# Patient Record
Sex: FEMALE | Race: WHITE | NOT HISPANIC OR LATINO | Employment: FULL TIME | URBAN - METROPOLITAN AREA
[De-identification: names, ages, dates, MRNs, and addresses within clinical notes are randomized per-mention and may not be internally consistent; named-entity substitution may affect disease eponyms.]

---

## 2018-09-24 ENCOUNTER — AMBULATORY - HEALTHEAST (OUTPATIENT)
Dept: MULTI SPECIALTY CLINIC | Facility: CLINIC | Age: 33
End: 2018-09-24

## 2018-09-24 LAB — PAP SMEAR - HIM PATIENT REPORTED: NORMAL

## 2018-10-05 ENCOUNTER — COMMUNICATION - HEALTHEAST (OUTPATIENT)
Dept: FAMILY MEDICINE | Facility: CLINIC | Age: 33
End: 2018-10-05

## 2018-10-05 ENCOUNTER — OFFICE VISIT - HEALTHEAST (OUTPATIENT)
Dept: FAMILY MEDICINE | Facility: CLINIC | Age: 33
End: 2018-10-05

## 2018-10-05 DIAGNOSIS — F90.0 ATTENTION DEFICIT HYPERACTIVITY DISORDER (ADHD), PREDOMINANTLY INATTENTIVE TYPE: ICD-10-CM

## 2018-10-05 DIAGNOSIS — F41.9 ANXIETY: ICD-10-CM

## 2018-10-05 ASSESSMENT — MIFFLIN-ST. JEOR: SCORE: 1951.99

## 2018-11-05 ENCOUNTER — OFFICE VISIT - HEALTHEAST (OUTPATIENT)
Dept: FAMILY MEDICINE | Facility: CLINIC | Age: 33
End: 2018-11-05

## 2018-11-05 DIAGNOSIS — F41.9 ANXIETY: ICD-10-CM

## 2018-11-05 DIAGNOSIS — K61.1 PERIRECTAL ABSCESS: ICD-10-CM

## 2018-11-05 DIAGNOSIS — F90.0 ATTENTION DEFICIT HYPERACTIVITY DISORDER (ADHD), PREDOMINANTLY INATTENTIVE TYPE: ICD-10-CM

## 2018-11-05 ASSESSMENT — MIFFLIN-ST. JEOR: SCORE: 1952.45

## 2018-12-03 ENCOUNTER — COMMUNICATION - HEALTHEAST (OUTPATIENT)
Dept: FAMILY MEDICINE | Facility: CLINIC | Age: 33
End: 2018-12-03

## 2018-12-11 ENCOUNTER — OFFICE VISIT - HEALTHEAST (OUTPATIENT)
Dept: FAMILY MEDICINE | Facility: CLINIC | Age: 33
End: 2018-12-11

## 2018-12-11 DIAGNOSIS — F90.0 ATTENTION DEFICIT HYPERACTIVITY DISORDER (ADHD), PREDOMINANTLY INATTENTIVE TYPE: ICD-10-CM

## 2018-12-11 DIAGNOSIS — F41.9 ANXIETY: ICD-10-CM

## 2018-12-11 ASSESSMENT — MIFFLIN-ST. JEOR: SCORE: 1913.89

## 2019-01-04 ENCOUNTER — OFFICE VISIT - HEALTHEAST (OUTPATIENT)
Dept: FAMILY MEDICINE | Facility: CLINIC | Age: 34
End: 2019-01-04

## 2019-01-04 DIAGNOSIS — F41.9 ANXIETY: ICD-10-CM

## 2019-01-04 DIAGNOSIS — F90.0 ATTENTION DEFICIT HYPERACTIVITY DISORDER (ADHD), PREDOMINANTLY INATTENTIVE TYPE: ICD-10-CM

## 2019-01-04 ASSESSMENT — MIFFLIN-ST. JEOR: SCORE: 1888.83

## 2019-01-29 ENCOUNTER — COMMUNICATION - HEALTHEAST (OUTPATIENT)
Dept: SCHEDULING | Facility: CLINIC | Age: 34
End: 2019-01-29

## 2019-01-31 ENCOUNTER — COMMUNICATION - HEALTHEAST (OUTPATIENT)
Dept: FAMILY MEDICINE | Facility: CLINIC | Age: 34
End: 2019-01-31

## 2019-03-11 ENCOUNTER — COMMUNICATION - HEALTHEAST (OUTPATIENT)
Dept: FAMILY MEDICINE | Facility: CLINIC | Age: 34
End: 2019-03-11

## 2019-05-10 ENCOUNTER — COMMUNICATION - HEALTHEAST (OUTPATIENT)
Dept: FAMILY MEDICINE | Facility: CLINIC | Age: 34
End: 2019-05-10

## 2019-05-10 DIAGNOSIS — F90.0 ATTENTION DEFICIT HYPERACTIVITY DISORDER (ADHD), PREDOMINANTLY INATTENTIVE TYPE: ICD-10-CM

## 2019-06-28 ENCOUNTER — COMMUNICATION - HEALTHEAST (OUTPATIENT)
Dept: FAMILY MEDICINE | Facility: CLINIC | Age: 34
End: 2019-06-28

## 2019-06-28 DIAGNOSIS — F41.9 ANXIETY: ICD-10-CM

## 2019-07-10 ENCOUNTER — COMMUNICATION - HEALTHEAST (OUTPATIENT)
Dept: FAMILY MEDICINE | Facility: CLINIC | Age: 34
End: 2019-07-10

## 2019-07-10 DIAGNOSIS — F41.9 ANXIETY: ICD-10-CM

## 2019-09-17 ENCOUNTER — COMMUNICATION - HEALTHEAST (OUTPATIENT)
Dept: FAMILY MEDICINE | Facility: CLINIC | Age: 34
End: 2019-09-17

## 2019-09-17 ENCOUNTER — OFFICE VISIT - HEALTHEAST (OUTPATIENT)
Dept: FAMILY MEDICINE | Facility: CLINIC | Age: 34
End: 2019-09-17

## 2019-09-17 DIAGNOSIS — Z78.9 ELECTRONIC CIGARETTE USE: ICD-10-CM

## 2019-09-17 DIAGNOSIS — F41.9 ANXIETY: ICD-10-CM

## 2019-09-17 DIAGNOSIS — F90.0 ATTENTION DEFICIT HYPERACTIVITY DISORDER (ADHD), PREDOMINANTLY INATTENTIVE TYPE: ICD-10-CM

## 2019-09-17 DIAGNOSIS — Z23 NEED FOR VACCINATION: ICD-10-CM

## 2019-09-17 ASSESSMENT — ANXIETY QUESTIONNAIRES
4. TROUBLE RELAXING: SEVERAL DAYS
6. BECOMING EASILY ANNOYED OR IRRITABLE: SEVERAL DAYS
GAD7 TOTAL SCORE: 7
IF YOU CHECKED OFF ANY PROBLEMS ON THIS QUESTIONNAIRE, HOW DIFFICULT HAVE THESE PROBLEMS MADE IT FOR YOU TO DO YOUR WORK, TAKE CARE OF THINGS AT HOME, OR GET ALONG WITH OTHER PEOPLE: NOT DIFFICULT AT ALL
2. NOT BEING ABLE TO STOP OR CONTROL WORRYING: SEVERAL DAYS
1. FEELING NERVOUS, ANXIOUS, OR ON EDGE: MORE THAN HALF THE DAYS
5. BEING SO RESTLESS THAT IT IS HARD TO SIT STILL: NOT AT ALL
7. FEELING AFRAID AS IF SOMETHING AWFUL MIGHT HAPPEN: SEVERAL DAYS
3. WORRYING TOO MUCH ABOUT DIFFERENT THINGS: SEVERAL DAYS

## 2019-09-17 ASSESSMENT — PATIENT HEALTH QUESTIONNAIRE - PHQ9: SUM OF ALL RESPONSES TO PHQ QUESTIONS 1-9: 3

## 2019-09-17 ASSESSMENT — MIFFLIN-ST. JEOR: SCORE: 1913.78

## 2019-10-04 ENCOUNTER — OFFICE VISIT - HEALTHEAST (OUTPATIENT)
Dept: FAMILY MEDICINE | Facility: CLINIC | Age: 34
End: 2019-10-04

## 2019-10-04 DIAGNOSIS — L03.116 CELLULITIS OF LEFT LOWER EXTREMITY: ICD-10-CM

## 2019-10-08 ENCOUNTER — OFFICE VISIT - HEALTHEAST (OUTPATIENT)
Dept: FAMILY MEDICINE | Facility: CLINIC | Age: 34
End: 2019-10-08

## 2019-10-08 DIAGNOSIS — E66.01 CLASS 3 SEVERE OBESITY DUE TO EXCESS CALORIES WITHOUT SERIOUS COMORBIDITY WITH BODY MASS INDEX (BMI) OF 40.0 TO 44.9 IN ADULT (H): ICD-10-CM

## 2019-10-08 DIAGNOSIS — L02.419 CELLULITIS AND ABSCESS OF LEG: ICD-10-CM

## 2019-10-08 DIAGNOSIS — L03.119 CELLULITIS AND ABSCESS OF LEG: ICD-10-CM

## 2019-10-08 DIAGNOSIS — E66.813 CLASS 3 SEVERE OBESITY DUE TO EXCESS CALORIES WITHOUT SERIOUS COMORBIDITY WITH BODY MASS INDEX (BMI) OF 40.0 TO 44.9 IN ADULT (H): ICD-10-CM

## 2019-10-08 ASSESSMENT — MIFFLIN-ST. JEOR: SCORE: 1891.78

## 2020-01-29 ENCOUNTER — COMMUNICATION - HEALTHEAST (OUTPATIENT)
Dept: FAMILY MEDICINE | Facility: CLINIC | Age: 35
End: 2020-01-29

## 2020-01-29 DIAGNOSIS — Z87.891 PERSONAL HISTORY OF TOBACCO USE, PRESENTING HAZARDS TO HEALTH: ICD-10-CM

## 2020-01-29 DIAGNOSIS — F41.9 ANXIETY: ICD-10-CM

## 2020-01-29 DIAGNOSIS — F90.0 ATTENTION DEFICIT HYPERACTIVITY DISORDER (ADHD), PREDOMINANTLY INATTENTIVE TYPE: ICD-10-CM

## 2020-03-13 ENCOUNTER — COMMUNICATION - HEALTHEAST (OUTPATIENT)
Dept: FAMILY MEDICINE | Facility: CLINIC | Age: 35
End: 2020-03-13

## 2020-03-13 DIAGNOSIS — F90.0 ATTENTION DEFICIT HYPERACTIVITY DISORDER (ADHD), PREDOMINANTLY INATTENTIVE TYPE: ICD-10-CM

## 2020-03-13 DIAGNOSIS — K21.9 GASTROESOPHAGEAL REFLUX DISEASE, ESOPHAGITIS PRESENCE NOT SPECIFIED: ICD-10-CM

## 2020-03-18 ENCOUNTER — COMMUNICATION - HEALTHEAST (OUTPATIENT)
Dept: FAMILY MEDICINE | Facility: CLINIC | Age: 35
End: 2020-03-18

## 2020-12-10 ENCOUNTER — RECORDS - HEALTHEAST (OUTPATIENT)
Dept: LAB | Facility: CLINIC | Age: 35
End: 2020-12-10

## 2020-12-12 LAB — BACTERIA SPEC CULT: NORMAL

## 2021-01-07 ENCOUNTER — OFFICE VISIT - HEALTHEAST (OUTPATIENT)
Dept: FAMILY MEDICINE | Facility: CLINIC | Age: 36
End: 2021-01-07

## 2021-01-07 DIAGNOSIS — E66.01 CLASS 3 SEVERE OBESITY DUE TO EXCESS CALORIES WITHOUT SERIOUS COMORBIDITY WITH BODY MASS INDEX (BMI) OF 45.0 TO 49.9 IN ADULT (H): ICD-10-CM

## 2021-01-07 DIAGNOSIS — F32.1 CURRENT MODERATE EPISODE OF MAJOR DEPRESSIVE DISORDER, UNSPECIFIED WHETHER RECURRENT (H): ICD-10-CM

## 2021-01-07 DIAGNOSIS — E66.813 CLASS 3 SEVERE OBESITY DUE TO EXCESS CALORIES WITHOUT SERIOUS COMORBIDITY WITH BODY MASS INDEX (BMI) OF 45.0 TO 49.9 IN ADULT (H): ICD-10-CM

## 2021-01-07 DIAGNOSIS — F90.0 ATTENTION DEFICIT HYPERACTIVITY DISORDER (ADHD), PREDOMINANTLY INATTENTIVE TYPE: ICD-10-CM

## 2021-01-07 DIAGNOSIS — F41.9 ANXIETY: ICD-10-CM

## 2021-01-07 RX ORDER — ALPRAZOLAM 0.5 MG
0.25-0.5 TABLET ORAL DAILY PRN
Qty: 20 TABLET | Refills: 0 | Status: SHIPPED | OUTPATIENT
Start: 2021-01-07 | End: 2023-03-13

## 2021-01-07 ASSESSMENT — ANXIETY QUESTIONNAIRES
1. FEELING NERVOUS, ANXIOUS, OR ON EDGE: MORE THAN HALF THE DAYS
3. WORRYING TOO MUCH ABOUT DIFFERENT THINGS: MORE THAN HALF THE DAYS
4. TROUBLE RELAXING: MORE THAN HALF THE DAYS
7. FEELING AFRAID AS IF SOMETHING AWFUL MIGHT HAPPEN: SEVERAL DAYS
GAD7 TOTAL SCORE: 10
2. NOT BEING ABLE TO STOP OR CONTROL WORRYING: MORE THAN HALF THE DAYS
5. BEING SO RESTLESS THAT IT IS HARD TO SIT STILL: NOT AT ALL
IF YOU CHECKED OFF ANY PROBLEMS ON THIS QUESTIONNAIRE, HOW DIFFICULT HAVE THESE PROBLEMS MADE IT FOR YOU TO DO YOUR WORK, TAKE CARE OF THINGS AT HOME, OR GET ALONG WITH OTHER PEOPLE: VERY DIFFICULT
6. BECOMING EASILY ANNOYED OR IRRITABLE: SEVERAL DAYS

## 2021-01-07 ASSESSMENT — PATIENT HEALTH QUESTIONNAIRE - PHQ9: SUM OF ALL RESPONSES TO PHQ QUESTIONS 1-9: 14

## 2021-01-07 ASSESSMENT — MIFFLIN-ST. JEOR: SCORE: 1992.7

## 2021-02-05 ENCOUNTER — COMMUNICATION - HEALTHEAST (OUTPATIENT)
Dept: FAMILY MEDICINE | Facility: CLINIC | Age: 36
End: 2021-02-05

## 2021-02-05 DIAGNOSIS — F41.9 ANXIETY: ICD-10-CM

## 2021-02-11 ENCOUNTER — OFFICE VISIT - HEALTHEAST (OUTPATIENT)
Dept: FAMILY MEDICINE | Facility: CLINIC | Age: 36
End: 2021-02-11

## 2021-02-11 DIAGNOSIS — F41.9 ANXIETY: ICD-10-CM

## 2021-02-11 DIAGNOSIS — F32.0 MILD MAJOR DEPRESSION (H): ICD-10-CM

## 2021-02-11 DIAGNOSIS — J34.89 RHINORRHEA: ICD-10-CM

## 2021-02-11 ASSESSMENT — ANXIETY QUESTIONNAIRES
3. WORRYING TOO MUCH ABOUT DIFFERENT THINGS: SEVERAL DAYS
5. BEING SO RESTLESS THAT IT IS HARD TO SIT STILL: NOT AT ALL
GAD7 TOTAL SCORE: 4
IF YOU CHECKED OFF ANY PROBLEMS ON THIS QUESTIONNAIRE, HOW DIFFICULT HAVE THESE PROBLEMS MADE IT FOR YOU TO DO YOUR WORK, TAKE CARE OF THINGS AT HOME, OR GET ALONG WITH OTHER PEOPLE: SOMEWHAT DIFFICULT
1. FEELING NERVOUS, ANXIOUS, OR ON EDGE: SEVERAL DAYS
6. BECOMING EASILY ANNOYED OR IRRITABLE: NOT AT ALL
2. NOT BEING ABLE TO STOP OR CONTROL WORRYING: SEVERAL DAYS
7. FEELING AFRAID AS IF SOMETHING AWFUL MIGHT HAPPEN: NOT AT ALL
4. TROUBLE RELAXING: SEVERAL DAYS

## 2021-02-11 ASSESSMENT — PATIENT HEALTH QUESTIONNAIRE - PHQ9: SUM OF ALL RESPONSES TO PHQ QUESTIONS 1-9: 4

## 2021-02-11 ASSESSMENT — MIFFLIN-ST. JEOR: SCORE: 1989.98

## 2021-02-13 ENCOUNTER — COMMUNICATION - HEALTHEAST (OUTPATIENT)
Dept: SCHEDULING | Facility: CLINIC | Age: 36
End: 2021-02-13

## 2021-03-01 ENCOUNTER — COMMUNICATION - HEALTHEAST (OUTPATIENT)
Dept: FAMILY MEDICINE | Facility: CLINIC | Age: 36
End: 2021-03-01

## 2021-03-01 DIAGNOSIS — F41.9 ANXIETY: ICD-10-CM

## 2021-03-05 ENCOUNTER — COMMUNICATION - HEALTHEAST (OUTPATIENT)
Dept: ADMINISTRATIVE | Facility: CLINIC | Age: 36
End: 2021-03-05

## 2021-03-05 DIAGNOSIS — F90.0 ATTENTION DEFICIT HYPERACTIVITY DISORDER (ADHD), PREDOMINANTLY INATTENTIVE TYPE: ICD-10-CM

## 2021-04-26 ENCOUNTER — AMBULATORY - HEALTHEAST (OUTPATIENT)
Dept: NURSING | Facility: CLINIC | Age: 36
End: 2021-04-26

## 2021-04-28 ENCOUNTER — COMMUNICATION - HEALTHEAST (OUTPATIENT)
Dept: FAMILY MEDICINE | Facility: CLINIC | Age: 36
End: 2021-04-28

## 2021-04-28 DIAGNOSIS — F90.0 ATTENTION DEFICIT HYPERACTIVITY DISORDER (ADHD), PREDOMINANTLY INATTENTIVE TYPE: ICD-10-CM

## 2021-04-28 DIAGNOSIS — F41.9 ANXIETY: ICD-10-CM

## 2021-04-28 DIAGNOSIS — R12 HEARTBURN: ICD-10-CM

## 2021-04-28 RX ORDER — FAMOTIDINE 20 MG/1
TABLET, FILM COATED ORAL
Qty: 180 TABLET | Refills: 1 | Status: SHIPPED | OUTPATIENT
Start: 2021-04-28 | End: 2022-05-04

## 2021-05-03 ENCOUNTER — OFFICE VISIT - HEALTHEAST (OUTPATIENT)
Dept: FAMILY MEDICINE | Facility: CLINIC | Age: 36
End: 2021-05-03

## 2021-05-03 DIAGNOSIS — F41.9 ANXIETY: ICD-10-CM

## 2021-05-03 DIAGNOSIS — Z13.1 ENCOUNTER FOR SCREENING FOR DIABETES MELLITUS: ICD-10-CM

## 2021-05-03 DIAGNOSIS — Z00.00 ENCOUNTER FOR GENERAL ADULT MEDICAL EXAMINATION WITHOUT ABNORMAL FINDINGS: ICD-10-CM

## 2021-05-03 DIAGNOSIS — R73.01 IMPAIRED FASTING GLUCOSE: ICD-10-CM

## 2021-05-03 DIAGNOSIS — Z13.220 ENCOUNTER FOR SCREENING FOR LIPOID DISORDERS: ICD-10-CM

## 2021-05-03 DIAGNOSIS — D17.30 LIPOMA OF SKIN AND SUBCUTANEOUS TISSUE: ICD-10-CM

## 2021-05-03 LAB
CHOLEST SERPL-MCNC: 169 MG/DL
FASTING STATUS PATIENT QL REPORTED: YES
FASTING STATUS PATIENT QL REPORTED: YES
GLUCOSE BLD-MCNC: 162 MG/DL (ref 70–99)
HDLC SERPL-MCNC: 41 MG/DL
HIV 1+2 AB+HIV1 P24 AG SERPL QL IA: NEGATIVE
LDLC SERPL CALC-MCNC: 98 MG/DL
TRIGL SERPL-MCNC: 148 MG/DL

## 2021-05-03 RX ORDER — SERTRALINE HYDROCHLORIDE 100 MG/1
100 TABLET, FILM COATED ORAL DAILY
Qty: 90 TABLET | Refills: 3 | Status: SHIPPED | OUTPATIENT
Start: 2021-05-03 | End: 2021-09-01

## 2021-05-03 ASSESSMENT — ANXIETY QUESTIONNAIRES
GAD7 TOTAL SCORE: 1
5. BEING SO RESTLESS THAT IT IS HARD TO SIT STILL: NOT AT ALL
2. NOT BEING ABLE TO STOP OR CONTROL WORRYING: NOT AT ALL
1. FEELING NERVOUS, ANXIOUS, OR ON EDGE: SEVERAL DAYS
7. FEELING AFRAID AS IF SOMETHING AWFUL MIGHT HAPPEN: NOT AT ALL
4. TROUBLE RELAXING: NOT AT ALL
3. WORRYING TOO MUCH ABOUT DIFFERENT THINGS: NOT AT ALL
6. BECOMING EASILY ANNOYED OR IRRITABLE: NOT AT ALL

## 2021-05-03 ASSESSMENT — MIFFLIN-ST. JEOR: SCORE: 1991.8

## 2021-05-03 ASSESSMENT — PATIENT HEALTH QUESTIONNAIRE - PHQ9: SUM OF ALL RESPONSES TO PHQ QUESTIONS 1-9: 6

## 2021-05-04 ENCOUNTER — AMBULATORY - HEALTHEAST (OUTPATIENT)
Dept: FAMILY MEDICINE | Facility: CLINIC | Age: 36
End: 2021-05-04

## 2021-05-04 ENCOUNTER — COMMUNICATION - HEALTHEAST (OUTPATIENT)
Dept: FAMILY MEDICINE | Facility: CLINIC | Age: 36
End: 2021-05-04

## 2021-05-04 DIAGNOSIS — R76.8 POSITIVE HEPATITIS C ANTIBODY TEST: ICD-10-CM

## 2021-05-04 LAB
HBA1C MFR BLD: 7.3 %
HCV AB SERPL QL IA: POSITIVE

## 2021-05-05 ENCOUNTER — AMBULATORY - HEALTHEAST (OUTPATIENT)
Dept: LAB | Facility: CLINIC | Age: 36
End: 2021-05-05

## 2021-05-05 DIAGNOSIS — R76.8 POSITIVE HEPATITIS C ANTIBODY TEST: ICD-10-CM

## 2021-05-09 LAB
HCV RNA SERPL NAA+PROBE-ACNC: NORMAL [IU]/ML
HCV RNA SERPL NAA+PROBE-LOG IU: NORMAL LOG IU/ML

## 2021-05-17 ENCOUNTER — AMBULATORY - HEALTHEAST (OUTPATIENT)
Dept: NURSING | Facility: CLINIC | Age: 36
End: 2021-05-17

## 2021-05-19 ENCOUNTER — OFFICE VISIT - HEALTHEAST (OUTPATIENT)
Dept: FAMILY MEDICINE | Facility: CLINIC | Age: 36
End: 2021-05-19

## 2021-05-19 DIAGNOSIS — E11.9 TYPE 2 DIABETES MELLITUS WITHOUT COMPLICATION, WITHOUT LONG-TERM CURRENT USE OF INSULIN (H): ICD-10-CM

## 2021-05-19 DIAGNOSIS — F90.0 ATTENTION DEFICIT HYPERACTIVITY DISORDER (ADHD), PREDOMINANTLY INATTENTIVE TYPE: ICD-10-CM

## 2021-05-19 DIAGNOSIS — R03.0 ELEVATED BLOOD PRESSURE READING WITHOUT DIAGNOSIS OF HYPERTENSION: ICD-10-CM

## 2021-05-19 DIAGNOSIS — M79.644 PAIN OF FINGER OF RIGHT HAND: ICD-10-CM

## 2021-05-19 LAB
ALBUMIN SERPL-MCNC: 3.9 G/DL (ref 3.5–5)
ALP SERPL-CCNC: 68 U/L (ref 45–120)
ALT SERPL W P-5'-P-CCNC: 45 U/L (ref 0–45)
ANION GAP SERPL CALCULATED.3IONS-SCNC: 13 MMOL/L (ref 5–18)
AST SERPL W P-5'-P-CCNC: 30 U/L (ref 0–40)
BILIRUB SERPL-MCNC: 0.4 MG/DL (ref 0–1)
BUN SERPL-MCNC: 8 MG/DL (ref 8–22)
CALCIUM SERPL-MCNC: 9.2 MG/DL (ref 8.5–10.5)
CHLORIDE BLD-SCNC: 106 MMOL/L (ref 98–107)
CO2 SERPL-SCNC: 23 MMOL/L (ref 22–31)
CREAT SERPL-MCNC: 0.72 MG/DL (ref 0.6–1.1)
CREAT UR-MCNC: 121.2 MG/DL
GFR SERPL CREATININE-BSD FRML MDRD: >60 ML/MIN/1.73M2
GLUCOSE BLD-MCNC: 108 MG/DL (ref 70–125)
MICROALBUMIN UR-MCNC: 1.03 MG/DL (ref 0–1.99)
MICROALBUMIN/CREAT UR: 8.5 MG/G
POTASSIUM BLD-SCNC: 4.2 MMOL/L (ref 3.5–5)
PROT SERPL-MCNC: 6.8 G/DL (ref 6–8)
SODIUM SERPL-SCNC: 142 MMOL/L (ref 136–145)

## 2021-05-19 ASSESSMENT — MIFFLIN-ST. JEOR: SCORE: 1989.98

## 2021-05-26 ASSESSMENT — PATIENT HEALTH QUESTIONNAIRE - PHQ9: SUM OF ALL RESPONSES TO PHQ QUESTIONS 1-9: 3

## 2021-05-27 VITALS
WEIGHT: 286.4 LBS | BODY MASS INDEX: 47.72 KG/M2 | HEART RATE: 74 BPM | SYSTOLIC BLOOD PRESSURE: 137 MMHG | HEIGHT: 65 IN | DIASTOLIC BLOOD PRESSURE: 89 MMHG

## 2021-05-27 VITALS
HEIGHT: 65 IN | SYSTOLIC BLOOD PRESSURE: 137 MMHG | WEIGHT: 286.8 LBS | HEART RATE: 83 BPM | BODY MASS INDEX: 47.78 KG/M2 | DIASTOLIC BLOOD PRESSURE: 90 MMHG

## 2021-05-27 ASSESSMENT — PATIENT HEALTH QUESTIONNAIRE - PHQ9
SUM OF ALL RESPONSES TO PHQ QUESTIONS 1-9: 14
SUM OF ALL RESPONSES TO PHQ QUESTIONS 1-9: 4
SUM OF ALL RESPONSES TO PHQ QUESTIONS 1-9: 6

## 2021-05-28 ASSESSMENT — ANXIETY QUESTIONNAIRES
GAD7 TOTAL SCORE: 1
GAD7 TOTAL SCORE: 4
GAD7 TOTAL SCORE: 10
GAD7 TOTAL SCORE: 7

## 2021-05-28 NOTE — TELEPHONE ENCOUNTER
Patient is completely out of medications    Controlled Substance Refill Request  Medication Name:   Requested Prescriptions     Pending Prescriptions Disp Refills     methylphenidate HCl (CONCERTA) 36 MG CR tablet 60 tablet 0     Sig: Take 2 tablets (72 mg total) by mouth daily.     methylphenidate HCl (CONCERTA) 36 MG CR tablet 60 tablet 0     Sig: Take 2 tablets (72 mg total) by mouth daily.     methylphenidate HCl (CONCERTA) 36 MG CR tablet 60 tablet 0     Sig: Take 2 tabs daily     Date Last Fill: 2/1/2019  Pharmacy: [facility]      Submit electronically to pharmacy  Controlled Substance Agreement Date Scanned:   Encounter-Level CSA Scan Date:    There are no encounter-level csa scan date.       Last office visit with prescriber/PCP: 1/4/2019 Ashley Lora MD OR same dept: 1/4/2019 Ashley Lora MD OR same specialty: 1/4/2019 Ashley Lora MD  Last physical: Visit date not found Last MTM visit: Visit date not found

## 2021-05-30 NOTE — TELEPHONE ENCOUNTER
Patient last seen 01/04/19    Patient advised to be seen in 6 months    Please review and advise.  Thank you so much!  Pita Bhakta, CMA

## 2021-05-30 NOTE — TELEPHONE ENCOUNTER
Patient last seen 01/04/19    Wants refill sertraline    Please review and advise.  Thank you so much!  Pita Bhakta, CMA

## 2021-06-01 VITALS — BODY MASS INDEX: 46.47 KG/M2 | WEIGHT: 278.9 LBS | HEIGHT: 65 IN

## 2021-06-01 NOTE — PROGRESS NOTES
Assessment / Impression     1. Attention deficit hyperactivity disorder (ADHD), predominantly inattentive type  methylphenidate HCl (CONCERTA) 36 MG CR tablet    methylphenidate HCl (CONCERTA) 36 MG CR tablet    methylphenidate HCl (CONCERTA) 36 MG CR tablet   2. Electronic cigarette use  nicotine (NICODERM CQ) 14 mg/24 hr   3. Anxiety     4. Need for vaccination  Influenza,Seasonal,Quad,INJ =/>6months         Plan:     ADHD: Had previously done well on higher dose of Concerta, will restart patient on this dose.  Reviewed controlled substance agreement with patient, it was signed, copy will be scanned to her chart.    Vaping: Discussed with patient the significant potential dangers of irregular heart beating use, including lung damage.  Counseled patient regarding cessation of vaping.  She was given a prescription for nicotine patch 14 mg to be used daily.  Instructed patient on use.    Anxiety: She is doing well currently on sertraline 50 mg daily, and she is not in need of refills.  Long discussion with patient today regarding use of Xanax on an as-needed basis.  I did review  with patient which did show that she has previously gone this prescription from multiple providers.  Patient states she did not need prescription today but would hope that I could refill this in the future if needed.  Discussed with patient that I would not expect her to need more than one prescription per year, given patient states she is using Xanax 1-2 times per month.  Patient understands and agrees.  Again, her controlled substance agreement would cover Xanax prescription as well.    Return in about 6 months (around 3/17/2020) for Med Check, Follow Up.    Subjective:      HPI: Teetee Alvarado is a 34 y.o. female who presents for medication follow-up for ADHD, depression, anxiety, and requesting nicotine patch.  She had run out of Concerta for a while because she did not have insurance until she started her new job.  As a result, instead  of taking Concerta 72 mg daily, she had been taking Concerta 36 mg daily to lengthen her daily use, though she certainly did not feel that it was as helpful.  She denies any adverse effects of medication such as palpitations or appetite suppression.    Anxiety: Currently taking 50 mg daily which has been helpful and keeps her anxiety well-controlled for the most part.  She does have Xanax 0.5 mg and will take half to 1 tab as needed, though states she is using it infrequently, maybe once or twice a month.  She likes it more for the fact that she has it on hand.  She is wondering if I will continue to manage this prescription, though she does not need refill right now.    Patient is no longer smoking cigarettes though now is vaping daily.  She had previously used nicotine patch which had been helpful.        Medical History:     Patient Active Problem List   Diagnosis     Attention deficit hyperactivity disorder (ADHD), predominantly inattentive type     Anxiety     PCOS (polycystic ovarian syndrome)       No past medical history on file.    No past surgical history on file.    Current Medications:     Current Outpatient Medications   Medication Sig     ALPRAZolam (XANAX) 0.5 MG tablet Take 0.5-1 tablets (0.25-0.5 mg total) by mouth daily as needed for anxiety.     ibuprofen (ADVIL,MOTRIN) 200 MG tablet Take 600 mg by mouth daily as needed.     [START ON 11/16/2019] methylphenidate HCl (CONCERTA) 36 MG CR tablet Take 2 tablets (72 mg total) by mouth daily.     ranitidine (ZANTAC) 150 MG tablet Take 150 mg by mouth.     sertraline (ZOLOFT) 50 MG tablet Take 1 tablet (50 mg total) by mouth daily.     [START ON 10/17/2019] methylphenidate HCl (CONCERTA) 36 MG CR tablet Take 2 tablets (72 mg total) by mouth daily.     methylphenidate HCl (CONCERTA) 36 MG CR tablet Take 1 tablet (36 mg total) by mouth daily.     multivitamin (ONE A DAY) per tablet Take 1 tablet by mouth daily.     nicotine (NICODERM CQ) 14 mg/24 hr Place  "1 patch on the skin daily.       Family History:   No family history on file.    Review of Systems  All other systems reviewed and are negative.         Social History:     Social History     Tobacco Use   Smoking Status Current Every Day Smoker   Smokeless Tobacco Current User   Tobacco Comment    vaping-daily     Social History     Social History Narrative     Not on file         Objective:     /73 (Patient Site: Right Arm, Patient Position: Sitting, Cuff Size: Adult Large)   Pulse 78   Temp 97.9  F (36.6  C) (Oral)   Resp 22   Ht 5' 5\" (1.651 m)   Wt (!) 269 lb 9.6 oz (122.3 kg)   LMP 09/03/2019 (Approximate) Comment: light spotting, PCOS unsure  BMI 44.86 kg/m    Physical Examination: General appearance - alert, well appearing, and in no distress  Eyes: pupils equal and reactive, extraocular eye movements intact  Mouth: mucous membranes moist, pharynx normal without lesions  Neck: supple, no significant adenopathy or thyromegaly  Lungs: clear to auscultation, no wheezes, rales or rhonchi, symmetric air entry  Heart: normal rate, regular rhythm, normal S1, S2, no murmurs.  Neurological: alert, oriented, normal speech, no focal findings or movement disorder noted.    Extremities: No edema, no clubbing or cyanosis  Psychiatric: Normal affect. Does not appear anxious or depressed.  Normal speech pattern.  Maintains eye contact.  PHQ-9 = 3.  JAE-7 = 7.  See flow sheet for details.    No results found for this or any previous visit (from the past 168 hour(s)).      Ashley Lora MD  9/17/2019  1:05 PM        "

## 2021-06-01 NOTE — TELEPHONE ENCOUNTER
Received a fax from [employer] requesting clarification of RX Concerta   All RX's should be for 2 pills     Called and verbally advised pharmacy should be 2 daily     Said because this is a controlled substance must be resent electronically    Please review and advise.  Thank you so much!  Pita Bhakta, CMA

## 2021-06-02 VITALS — WEIGHT: 264.1 LBS | BODY MASS INDEX: 44 KG/M2 | HEIGHT: 65 IN

## 2021-06-02 VITALS — BODY MASS INDEX: 46.48 KG/M2 | WEIGHT: 279 LBS | HEIGHT: 65 IN

## 2021-06-02 VITALS — HEIGHT: 65 IN | WEIGHT: 270.5 LBS | BODY MASS INDEX: 45.07 KG/M2

## 2021-06-02 NOTE — PROGRESS NOTES
"ASSESSMENT:   1. Cellulitis of left lower extremity  cephalexin (KEFLEX) 500 MG capsule       PLAN:  There is an area of cellulitis to the patient's left inner thigh on exam today, small mobile tender mass is palpable on exam.  There is no fluctuant area amenable to drainage, exam favors more of an enlarged lymph node or cyst to than an abscess.  Given patient's history, we will start her on cephalexin 4 times daily to treat the cellulitis.  Did discuss with her at length signs and symptoms of worsening illness and reasons that should prompt presentation to the emergency department.    I discussed red flag symptoms, return precautions to clinic/ER and follow up care with patient/parent.  Expected clinical course, symptomatic cares advised. Questions answered. Patient/parent amenable with plan.    Patient Instructions:  Patient Instructions   Cellulitis is the cause of your symptoms today. We are going to start you on antibiotics.       For your infection, please take the antibiotic, Keflex, 4 times a day for the next 10 days.     For your pain, please use Ibuprofen 600mg every 6 hours as needed for pain.     Keep the leg elevated to reduce swelling and promote healing.     We marked the edges of the wound with a surgical marker. Recheck in the Clinic in 1 to 2 days to be sure the infection is improving.    Please take your medicines as recommended above and review the discharge instructions for concerning signs/symptoms that would require your prompt return to the emergency department for further evaluation. Please follow up in clinic as we have recommended below. If your symptoms worsen, develop fevers, nausea, vomiting, or increased pain, prior to your follow up appointment, please return to clinic/emergency department.           SUBJECTIVE:   Teetee Alvarado is a 34 y.o. female who presents today with \"lump\" to the inner left thigh which became painful yesterday.  Patient is unsure how long the lump has been there.  " She has a previous history of abscess in this area with associated cellulitis that resulted in sepsis.  She has not had any fevers or ill symptoms.  Denies nausea or vomiting, increased fatigue, measured fever.  Denies sweats, chills, body aches.  Area overlying the lump turned red this morning prompting her to seek treatment.  No drainage from the area.  No home treatment for this.      ROS:  Comprehensive 12 pt ROS completed, positives noted in HPI, otherwise negative.      Past Medical History:  Patient Active Problem List   Diagnosis     Attention deficit hyperactivity disorder (ADHD), predominantly inattentive type     Anxiety     PCOS (polycystic ovarian syndrome)       Surgical History:  No past surgical history on file.        Family History:  No family history on file.    Reviewed; Non-contributory    Social History     Tobacco Use   Smoking Status Current Every Day Smoker     Packs/day: 0.00   Smokeless Tobacco Never Used   Tobacco Comment    vaping-daily     Current Medications:  Current Outpatient Medications on File Prior to Visit   Medication Sig Dispense Refill     [START ON 11/16/2019] methylphenidate HCl (CONCERTA) 36 MG CR tablet Take 2 tablets (72 mg total) by mouth daily. 60 tablet 0     nicotine (NICODERM CQ) 14 mg/24 hr Place 1 patch on the skin daily. 30 patch 0     ranitidine (ZANTAC) 150 MG tablet Take 150 mg by mouth.       sertraline (ZOLOFT) 50 MG tablet Take 1 tablet (50 mg total) by mouth daily. 90 tablet 1     ALPRAZolam (XANAX) 0.5 MG tablet Take 0.5-1 tablets (0.25-0.5 mg total) by mouth daily as needed for anxiety. 20 tablet 0     ibuprofen (ADVIL,MOTRIN) 200 MG tablet Take 600 mg by mouth daily as needed.       [START ON 10/17/2019] methylphenidate HCl (CONCERTA) 36 MG CR tablet Take 2 tablets (72 mg total) by mouth daily. 60 tablet 0     methylphenidate HCl (CONCERTA) 36 MG CR tablet Take 2 tablets (72 mg total) by mouth daily. 60 tablet 0     multivitamin (ONE A DAY) per tablet  Take 1 tablet by mouth daily.       No current facility-administered medications on file prior to visit.        Allergies:   No Known Allergies    OBJECTIVE:   Vitals:    10/04/19 1539   BP: 134/85   Patient Site: Right Arm   Patient Position: Sitting   Cuff Size: Adult Large   Pulse: 88   Resp: 16   Temp: 98.4  F (36.9  C)   TempSrc: Oral   SpO2: 97%   Weight: (!) 268 lb (121.6 kg)     Physical exam reveals a pleasant 34 y.o. female.   General: Appears healthy, alert and cooperative. Non-toxic appearance.  Eyes: Nonicteric  Neck: supple  Pulmonary/Chest: Even, unlabored  Heart: regular rate   Left lower extremity: Near the left inguinal fold, there is an area of erythema approximately 2 cm x 3 cm.  Beneath this there is a firm, mobile, tender mass.  There is no fluctuance.  It is about 1 cm in diameter.  Favors an enlarged lymph node or cyst versus abscess  Neuro: Alert, oriented. Non-focal.  Skin: pink, warm, dry, and without lesions on limited skin exam. No rashes.  Psychiatric: Normal mood and affect.  Normal judgement and thought content. Normal behavior.       RADIOLOGY  none  LABORATORY STUDIES    none      Bernadette Olmos, ASHELY

## 2021-06-02 NOTE — PATIENT INSTRUCTIONS - HE
Cellulitis is the cause of your symptoms today. We are going to start you on antibiotics.       For your infection, please take the antibiotic, Keflex, 4 times a day for the next 10 days.     For your pain, please use Ibuprofen 600mg every 6 hours as needed for pain.     Keep the leg elevated to reduce swelling and promote healing.     We marked the edges of the wound with a surgical marker. Recheck in the Clinic in 1 to 2 days to be sure the infection is improving.    Please take your medicines as recommended above and review the discharge instructions for concerning signs/symptoms that would require your prompt return to the emergency department for further evaluation. Please follow up in clinic as we have recommended below. If your symptoms worsen, develop fevers, nausea, vomiting, or increased pain, prior to your follow up appointment, please return to clinic/emergency department.

## 2021-06-02 NOTE — PROGRESS NOTES
OFFICE VISIT NOTE      Assessment & Plan   Teetee Alvarado is a 34 y.o. female who is in the midst of treatment for an early cellulitis. She came in to have it checked because she feared it was not healing and possibly was spreading. Actually, on exam, it appears to be healing very well. I encouraged her with the exam results and my opinion. I encouraged her to complete her course of antibiotics and continue to self-monitor closely. If fevers or more/new soreness arises, then RTC.    Diagnoses and all orders for this visit:    Cellulitis and abscess of leg    Class 3 severe obesity due to excess calories without serious comorbidity with body mass index (BMI) of 40.0 to 44.9 in adult (H)                Subjective:   Chief Complaint:  Cellulitis (Left leg)    34 y.o. female.     1) She has been on antibiotics, cephalexin, for 4 days, and feels that the cellulitis is not getting better. She even wonders if it is instead spreading. She was told there is an enlargement of lymph nodes by her left inner thigh, but is unsure if it is her lymph node. She has not looked at the involved area (with a mirror).    2) She feels pain in her skin, not in the muscle, wrapping around her hip.     Current Outpatient Medications   Medication Sig     ALPRAZolam (XANAX) 0.5 MG tablet Take 0.5-1 tablets (0.25-0.5 mg total) by mouth daily as needed for anxiety.     cephalexin (KEFLEX) 500 MG capsule Take 1 capsule (500 mg total) by mouth 4 (four) times a day for 10 days.     ibuprofen (ADVIL,MOTRIN) 200 MG tablet Take 600 mg by mouth daily as needed.     [START ON 11/16/2019] methylphenidate HCl (CONCERTA) 36 MG CR tablet Take 2 tablets (72 mg total) by mouth daily.     [START ON 10/17/2019] methylphenidate HCl (CONCERTA) 36 MG CR tablet Take 2 tablets (72 mg total) by mouth daily.     methylphenidate HCl (CONCERTA) 36 MG CR tablet Take 2 tablets (72 mg total) by mouth daily.     multivitamin (ONE A DAY) per tablet Take 1 tablet by mouth  "daily.     nicotine (NICODERM CQ) 14 mg/24 hr Place 1 patch on the skin daily.     ranitidine (ZANTAC) 150 MG tablet Take 150 mg by mouth.     sertraline (ZOLOFT) 50 MG tablet Take 1 tablet (50 mg total) by mouth daily.       PSFHx: appropriate sections of PMH completed/filled in   Tobacco Status:  She  reports that she has been smoking. She has been smoking about 0.00 packs per day. She has never used smokeless tobacco.    Review of Systems: Pain in skin.  All other systems negative except as noted above.    Objective:    /70 (Patient Site: Left Arm, Patient Position: Sitting, Cuff Size: Adult Large)   Pulse 68   Temp 98.8  F (37.1  C) (Oral)   Resp 16   Ht 5' 5\" (1.651 m)   Wt (!) 264 lb 12 oz (120.1 kg)   LMP 09/24/2019 (Approximate)   BMI 44.06 kg/m      GENERAL: No acute distress.Obese  SKIN: left groin well-healed surgical scar noted, no erythema, patient had difficulty finding her sore spot, 2 lipomas found in upper medial thigh, palpated very soft and almost non-tender. In the left groin 1 lymph node was slightly enlarged but soft, she said mildly tender  No pubic hair erythema or folliculitis present in the perineum      Data Review  ADDITIONAL HISTORY SUMMARIZED (2): Reviewed the walk in care note from 10/04/2019 on cellulitis of left lower extremity.  DECISION TO OBTAIN EXTRA INFORMATION (1): None.   RADIOLOGY TESTS (1): None.  LABS (1): None.  MEDICINE TESTS (1): None.  INDEPENDENT REVIEW (2 each): None.       Spent 13 min face to face with patient with more the 50% spent in counseling, education and coordination of care and discussing cellulitis, lymph nodes.      IJaney am scribing for and in the presence of, Dr. Clemente.  I, Dr. DENICE Clemente, personally performed the services described in this documentation, as scribed by Janey Masters in my presence, and it is both accurate and complete.      Total Data  Points: 2    "

## 2021-06-03 VITALS
HEART RATE: 88 BPM | DIASTOLIC BLOOD PRESSURE: 85 MMHG | TEMPERATURE: 98.4 F | RESPIRATION RATE: 16 BRPM | WEIGHT: 268 LBS | BODY MASS INDEX: 44.6 KG/M2 | OXYGEN SATURATION: 97 % | SYSTOLIC BLOOD PRESSURE: 134 MMHG

## 2021-06-03 VITALS
SYSTOLIC BLOOD PRESSURE: 118 MMHG | DIASTOLIC BLOOD PRESSURE: 70 MMHG | HEIGHT: 65 IN | TEMPERATURE: 98.8 F | HEART RATE: 68 BPM | WEIGHT: 264.75 LBS | BODY MASS INDEX: 44.11 KG/M2 | RESPIRATION RATE: 16 BRPM

## 2021-06-03 VITALS
TEMPERATURE: 97.9 F | BODY MASS INDEX: 44.92 KG/M2 | SYSTOLIC BLOOD PRESSURE: 118 MMHG | HEART RATE: 78 BPM | DIASTOLIC BLOOD PRESSURE: 73 MMHG | RESPIRATION RATE: 22 BRPM | WEIGHT: 269.6 LBS | HEIGHT: 65 IN

## 2021-06-05 VITALS
WEIGHT: 287 LBS | SYSTOLIC BLOOD PRESSURE: 132 MMHG | DIASTOLIC BLOOD PRESSURE: 76 MMHG | HEART RATE: 75 BPM | HEIGHT: 65 IN | TEMPERATURE: 98.4 F | BODY MASS INDEX: 47.82 KG/M2

## 2021-06-05 VITALS
HEART RATE: 67 BPM | WEIGHT: 286.4 LBS | SYSTOLIC BLOOD PRESSURE: 142 MMHG | HEIGHT: 65 IN | RESPIRATION RATE: 16 BRPM | DIASTOLIC BLOOD PRESSURE: 87 MMHG | BODY MASS INDEX: 47.72 KG/M2

## 2021-06-05 NOTE — TELEPHONE ENCOUNTER
Refill Request  Did you contact pharmacy: No  Medication name:   Requested Prescriptions     Pending Prescriptions Disp Refills     sertraline (ZOLOFT) 50 MG tablet 90 tablet 1     Sig: Take 1 tablet (50 mg total) by mouth daily.     Who prescribed the medication: Ashley Lora MD  Requested Pharmacy: [facility]  Is patient out of medication: No.  3 days left  Patient notified refills processed in 3 business days:  yes  Okay to leave a detailed message: yes

## 2021-06-05 NOTE — TELEPHONE ENCOUNTER
Medication Question or Clarification  Who is calling: Patient  What medication are you calling about (include dose and sig)?: nicotine patch Step 2 step  Who prescribed the medication?: Ashley Lora MD per patient.  What is your question/concern?: The patient is asking to go back on patch Step1. She is having cravings with the Step 2 patches.  Patient states she is still vaping.  Please advise.   Requested Pharmacy: [facility]  Okay to leave a detailed message?: Yes

## 2021-06-05 NOTE — TELEPHONE ENCOUNTER
Controlled Substance Refill Request  Medication Name:   Requested Prescriptions     Pending Prescriptions Disp Refills     methylphenidate HCl (CONCERTA) 36 MG CR tablet 60 tablet 0     Sig: Take 2 tablets (72 mg total) by mouth daily.     Date Last Fill: 9/17/19  Is patient out of medication?:  Yes  Patient notified refills processed within 3 business days:  Yes  Requested Pharmacy: [facility]  Submit electronically to pharmacy  Controlled Substance Agreement on file:   Encounter-Level CSA Scan Date:    There are no encounter-level csa scan date.        Last office visit:  10/8/19

## 2021-06-05 NOTE — TELEPHONE ENCOUNTER
Per pt note below left a messae on her VM letting her know her step 1 patches were sent to the [facility]

## 2021-06-06 NOTE — TELEPHONE ENCOUNTER
Patient is asking for three refills if possible.   Controlled Substance Refill Request  Medication Name:   Requested Prescriptions     Pending Prescriptions Disp Refills     methylphenidate HCl (CONCERTA) 36 MG CR tablet 60 tablet 0     Sig: Take 2 tablets (72 mg total) by mouth daily.     Date Last Fill: 1/29/20  Is patient out of medication?: yes  Patient notified refills processed within 3 business days:  Yes  Requested Pharmacy: [facility]  Submit electronically to pharmacy  Controlled Substance Agreement on file:   Encounter-Level CSA Scan Date:    There are no encounter-level csa scan date.        Last office visit:  10/8/2019

## 2021-06-06 NOTE — TELEPHONE ENCOUNTER
Medication Request  Medication name: ranitidine  Requested Pharmacy: [facility]  Reason for request:   ranitidine (ZANTAC) 150 MG tablet  150 mg, Oral         Summary: Take 150 mg by mouth.   Dose, Frequency: 150 mg  Start: 1/21/2019  Ord/Sold: 9/17/2019           When did you use medication last?:  today  Patient offered appointment:  no  Okay to leave a detailed message: yes

## 2021-06-06 NOTE — TELEPHONE ENCOUNTER
Dx: Gastroesophageal reflux disease, esophagitis presence not specified    1 tab twice daily, her last Rx at this dose was for 12 months (01/2019) so this was her maintenance dose

## 2021-06-06 NOTE — TELEPHONE ENCOUNTER
This was previously prescribed by another provider, can you clarify how often pt is taking andwhat is she using it for?

## 2021-06-06 NOTE — TELEPHONE ENCOUNTER
Called patient regarding upcoming appointment on Monday.  Patient advised we are changing appointments due to to COVID19.    Patient expressed understanding.    Patient chose to do an e-visit    Patient sent a link to sign up for mychart, advised to initiate e-visit when she is available.    appt for Monday cancelled.    Pita Bhakta, [facility]

## 2021-06-11 ENCOUNTER — COMMUNICATION - HEALTHEAST (OUTPATIENT)
Dept: FAMILY MEDICINE | Facility: CLINIC | Age: 36
End: 2021-06-11

## 2021-06-11 DIAGNOSIS — F90.0 ATTENTION DEFICIT HYPERACTIVITY DISORDER (ADHD), PREDOMINANTLY INATTENTIVE TYPE: ICD-10-CM

## 2021-06-11 RX ORDER — METHYLPHENIDATE HYDROCHLORIDE 36 MG/1
72 TABLET ORAL DAILY
Qty: 60 TABLET | Refills: 0 | Status: SHIPPED | OUTPATIENT
Start: 2021-06-11 | End: 2021-07-19

## 2021-06-14 NOTE — PROGRESS NOTES
Assessment / Impression     1. Attention deficit hyperactivity disorder (ADHD), predominantly inattentive type  methylphenidate HCl (CONCERTA) 36 MG CR tablet   2. Anxiety  ALPRAZolam (XANAX) 0.5 MG tablet    sertraline (ZOLOFT) 50 MG tablet   3. Current moderate episode of major depressive disorder, unspecified whether recurrent (H)     4. Class 3 severe obesity due to excess calories without serious comorbidity with body mass index (BMI) of 45.0 to 49.9 in adult (H)           Plan:     1. Attention deficit hyperactivity disorder (ADHD), predominantly inattentive type  Reviewed controlled substance agreement with patient, copy was signed and will be scanned to her chart.  She was given 1 month prescription of Concerta for now.  She will follow-up with me in 1 month for anxiety and depression check and medication check for ADHD as well.  - methylphenidate HCl (CONCERTA) 36 MG CR tablet; Take 2 tablets (72 mg total) by mouth daily.  Dispense: 60 tablet; Refill: 0    2. Anxiety  3. Current moderate episode of major depressive disorder, unspecified whether recurrent (H)  - ALPRAZolam (XANAX) 0.5 MG tablet; Take 0.5-1 tablets (0.25-0.5 mg total) by mouth daily as needed for anxiety.  Dispense: 20 tablet; Refill: 0  - sertraline (ZOLOFT) 50 MG tablet; Take 1 tablet (50 mg total) by mouth daily.  Dispense: 30 tablet; Refill: 1  Will restart sertraline 25 mg daily for 1 week, then increase to 50 mg daily.  She was also given a small amount of alprazolam 0.5 mg #20 tabs with no refills.  Counseled patient on use.  Controlled substance agreement was also signed.    4. Class 3 severe obesity due to excess calories without serious comorbidity with body mass index (BMI) of 45.0 to 49.9 in adult (H)  Discussed with patient lifestyle changes, also recommended walking daily which may help with weight loss as well as mood.      Return in about 1 month (around 2/7/2021) for Med Check.    Subjective:      HPI: Teetee Alvarado is a 35  y.o. female, whom I haven't seen since 2019 who presents for requesting to restart her medications including her Concerta and sertraline, also requesting refill of alprazolam, which she uses infrequently, see below.    Stopped meds in March/April 2020, including concerta and sertraline.  She did this mostly because of COVID-19, and she was not working at the time.  She had an old bottle of Concerta so started taking that 3 days ago, which has helped at work.  She can tell she can lose track in middle of conversation when talking, can't complete things when cleaning house, etc. When not taking medication.  Denies any palpitations, though she has noted some appetite suppression when taking Concerta.    Without sertraline, has noted much worse anxiety.  Has been rough few weeks around the holidays.  Feels she may have some SAD, has been difficult because of COVID-19 and sheltering in place.  Doesn't feel/want to text people back.  No suicidal or homicidal ideation.  Feels she has no motivation to do things, nothing sounds fun.      She also previously had a prescription for alprazolam given to me in 2019.  Used infrequently, may use it weekly for a month, then go months without needing it.    Medical History:     Patient Active Problem List   Diagnosis     Attention deficit hyperactivity disorder (ADHD), predominantly inattentive type     Anxiety     PCOS (polycystic ovarian syndrome)     Class 3 severe obesity due to excess calories without serious comorbidity with body mass index (BMI) of 40.0 to 44.9 in adult (H)       No past medical history on file.    No past surgical history on file.    Current Medications:     Current Outpatient Medications   Medication Sig     ALPRAZolam (XANAX) 0.5 MG tablet Take 0.5-1 tablets (0.25-0.5 mg total) by mouth daily as needed for anxiety.     famotidine (PEPCID) 20 MG tablet TAKE 1 TABLET BY MOUTH 2 TIMES A DAY AS NEEDED FOR HEARTBURN.     methylphenidate HCl (CONCERTA) 36 MG CR  "tablet Take 2 tablets (72 mg total) by mouth daily.     multivitamin (ONE A DAY) per tablet Take 1 tablet by mouth daily.     sertraline (ZOLOFT) 50 MG tablet Take 1 tablet (50 mg total) by mouth daily.       Family History:   No family history on file.    Review of Systems  All other systems reviewed and are negative.         Social History:     Social History     Tobacco Use   Smoking Status Current Every Day Smoker     Packs/day: 0.00   Smokeless Tobacco Never Used   Tobacco Comment    vaping-daily     Social History     Social History Narrative     Not on file         Objective:     /76   Pulse 75   Temp 98.4  F (36.9  C) (Oral)   Ht 5' 5\" (1.651 m)   Wt (!) 287 lb (130.2 kg)   BMI 47.76 kg/m    Physical Examination: General appearance - alert, well appearing, and in no distress  Eyes: extraocular eye movements intact  Heart: normal rate, regular rhythm, normal S1, S2, no murmurs.  Neurological: alert, oriented, normal speech, no focal findings or movement disorder noted.    Psychiatric: Normal affect. Does not appear anxious or depressed.  Normal speech pattern.  Maintains eye contact.  No active suicidal homicidal ideation.  Please see flow sheet for PHQ-9 and JAE-7.    No results found for this or any previous visit (from the past 168 hour(s)).      Ashley Lora MD  1/7/2021  2:13 PM  Disclaimer: This note was dictated using speech recognition software. Minor errors in transcription may be present.        "

## 2021-06-15 NOTE — TELEPHONE ENCOUNTER
Refill Approved    Rx renewed per Medication Renewal Policy. Medication was last renewed on 2/11/21, last OV 2/11/21.    Nancy Phipps, [facility] Triage/Med Refill 3/1/2021     Requested Prescriptions   Pending Prescriptions Disp Refills     sertraline (ZOLOFT) 50 MG tablet 135 tablet 0     Sig: Take 1.5 tablets (75 mg total) by mouth daily.       SSRI Refill Protocol  Passed - 3/1/2021  2:34 PM        Passed - PCP or prescribing provider visit in last year     Last office visit with prescriber/PCP: 2/11/2021 Ashley Lora MD OR same dept: 2/11/2021 Ashley Lora MD OR same specialty: 2/11/2021 Ashley Lora MD  Last physical: Visit date not found Last MTM visit: Visit date not found   Next visit within 3 mo: Visit date not found  Next physical within 3 mo: Visit date not found  Prescriber OR PCP: Ashley Lora MD  Last diagnosis associated with med order: 1. Anxiety  - sertraline (ZOLOFT) 50 MG tablet; Take 1.5 tablets (75 mg total) by mouth daily.  Dispense: 135 tablet; Refill: 0    If protocol passes may refill for 12 months if within 3 months of last provider visit (or a total of 15 months).

## 2021-06-15 NOTE — PROGRESS NOTES
Assessment / Impression     1. Rhinorrhea  Symptomatic COVID-19 Virus (CORONAVIRUS) PCR   2. Mild major depression (H)     3. Anxiety  sertraline (ZOLOFT) 50 MG tablet         Plan:     Given her new cold symptoms, including rhinorrhea and chest congestion, recommend weight test patient for COVID-19.  I collected specimen while wearing PPE including gown, gloves, surgical mask, face shield, will inform patient of results.  She should continue to isolate for now.    Patient symptoms have improved, though given there is still room for improvement, we discussed increasing her medication dose.  Will increase to sertraline 75 mg daily.      Return in about 1 month (around 3/11/2021) for Follow Up, Med Check, Annual physical.        Subjective:      HPI: Teetee Alvarado is a 36 y.o. female who presents for medication follow-up.  She also notes cold symptoms, including rhinorrhea that started about 2 days ago, no cough though does have some chest congestion.  She was sent home from work due to her current symptoms.  No recent known exposures of COVID-19.  No fever.    From a mood medication perspective, patient is currently taking sertraline 50 mg daily, which she does feel is helpful, however she feels that a small further increase of medication could further help.  During the day she feels okay, however at night she notes some increased anxiety.      Medical History:     Patient Active Problem List   Diagnosis     Attention deficit hyperactivity disorder (ADHD), predominantly inattentive type     Anxiety     PCOS (polycystic ovarian syndrome)     Class 3 severe obesity due to excess calories without serious comorbidity with body mass index (BMI) of 40.0 to 44.9 in adult (H)     Mild major depression (H)       History reviewed. No pertinent past medical history.    History reviewed. No pertinent surgical history.    Current Medications:     Current Outpatient Medications   Medication Sig     ALPRAZolam (XANAX) 0.5 MG tablet  "Take 0.5-1 tablets (0.25-0.5 mg total) by mouth daily as needed for anxiety.     famotidine (PEPCID) 20 MG tablet TAKE 1 TABLET BY MOUTH 2 TIMES A DAY AS NEEDED FOR HEARTBURN.     methylphenidate HCl (CONCERTA) 36 MG CR tablet Take 2 tablets (72 mg total) by mouth daily.     multivitamin (ONE A DAY) per tablet Take 1 tablet by mouth daily.     sertraline (ZOLOFT) 50 MG tablet Take 1.5 tablets (75 mg total) by mouth daily.       Family History:   History reviewed. No pertinent family history.    Review of Systems  All other systems reviewed and are negative.         Social History:     Social History     Tobacco Use   Smoking Status Current Every Day Smoker     Packs/day: 0.00   Smokeless Tobacco Never Used   Tobacco Comment    vaping-daily     Social History     Social History Narrative    Works at  [employer]         Objective:     /87 (Patient Site: Left Arm, Patient Position: Sitting, Cuff Size: Adult Large)   Pulse 67   Resp 16   Ht 5' 5\" (1.651 m)   Wt (!) 286 lb 6.4 oz (129.9 kg)   BMI 47.66 kg/m    Physical Examination: General appearance - alert, well appearing, and in no distress  Eyes: extraocular eye movements intact  Ears: normal external ears, clear canals,  Left TM appears normal, with no redness or bulging noted.  Right TM appears normal, with no redness or bulging noted.  Mouth: mucous membranes moist, pharynx normal without lesions  Neck: supple, no significant adenopathy or thyromegaly  Lungs: clear to auscultation, no wheezes, rales or rhonchi, symmetric air entry  Heart: normal rate, regular rhythm, normal S1, S2, no murmurs.  Psychiatric: Normal affect. Does not appear anxious or depressed.  See flow sheet for PHQ-9 and JAE-7.  No active suicidal homicidal ideation.    No results found for this or any previous visit (from the past 168 hour(s)).      Ashley Lora MD  2/11/2021  2:01 PM  Disclaimer: This note was dictated using speech recognition software. Minor errors in " transcription may be present.

## 2021-06-16 PROBLEM — F41.9 ANXIETY: Status: ACTIVE | Noted: 2018-10-05

## 2021-06-16 PROBLEM — E66.813 CLASS 3 SEVERE OBESITY DUE TO EXCESS CALORIES WITHOUT SERIOUS COMORBIDITY WITH BODY MASS INDEX (BMI) OF 40.0 TO 44.9 IN ADULT (H): Status: ACTIVE | Noted: 2021-01-07

## 2021-06-16 PROBLEM — E66.01 CLASS 3 SEVERE OBESITY DUE TO EXCESS CALORIES WITHOUT SERIOUS COMORBIDITY WITH BODY MASS INDEX (BMI) OF 40.0 TO 44.9 IN ADULT (H): Status: ACTIVE | Noted: 2021-01-07

## 2021-06-16 PROBLEM — F32.0 MILD MAJOR DEPRESSION (H): Status: ACTIVE | Noted: 2021-02-11

## 2021-06-16 PROBLEM — E28.2 PCOS (POLYCYSTIC OVARIAN SYNDROME): Status: ACTIVE | Noted: 2018-10-16

## 2021-06-16 PROBLEM — E11.9 DIABETES MELLITUS, TYPE 2 (H): Status: ACTIVE | Noted: 2021-05-19

## 2021-06-16 PROBLEM — F90.0 ATTENTION DEFICIT HYPERACTIVITY DISORDER (ADHD), PREDOMINANTLY INATTENTIVE TYPE: Status: ACTIVE | Noted: 2018-10-05

## 2021-06-17 NOTE — TELEPHONE ENCOUNTER
----- Message from Ashley Lora MD sent at 5/4/2021  2:24 PM CDT -----  Please call patient and let them know I did leave them a voicemail and will send her a note via [facility] [employer] [facility].  Please assist patient in scheduling 40 min appointment with me regarding new diagnosis of diabetes.  Her hepatitis C screening results also came back positive, so I am going to order additional lab tests for confirmation.  If she has questions, I'm happy to talk more with her when I'm in clinic tomorrow, and she can let me know when is a good time to reach her. -Dr Lora

## 2021-06-17 NOTE — TELEPHONE ENCOUNTER
Please see request below. Rx's pended. I did change the sertraline to the 100mg tabs if you are ok with that change

## 2021-06-17 NOTE — TELEPHONE ENCOUNTER
Left message to call back for: Results  Information to relay to patient:  LMTCB, Please see message below from Dr. Lora.

## 2021-06-17 NOTE — TELEPHONE ENCOUNTER
Patient calling back. I informed her of results from provider and scheduled her a lab appt and f/u appt with pcp. Patient did not have any questions at this time.

## 2021-06-17 NOTE — TELEPHONE ENCOUNTER
Please let patient know I did refill her medications, including 1 months worth of sertraline 100 mg daily, however at her last visit with me I did want patient to follow-up with me in 1 month for physical and med check.  Please assist patient in scheduling physical appointment with me. Needs appointment before additional refills.

## 2021-06-17 NOTE — PROGRESS NOTES
Assessment / Impression     1. Type 2 diabetes mellitus without complication, without long-term current use of insulin (H)  Comprehensive Metabolic Panel    metFORMIN (GLUCOPHAGE) 500 MG tablet    Ambulatory referral to Diabetes Education Program (CDE)    aspirin 81 MG EC tablet    Microalbumin, Random Urine    Ambulatory referral to Ophthalmology    Diabetes foot exam   2. Attention deficit hyperactivity disorder (ADHD), predominantly inattentive type     3. Elevated blood pressure reading without diagnosis of hypertension     4. Pain of finger of right hand           Plan:     Spent a significant portion of the visit counseling patient regarding type 2 diabetes diagnosis.  We reviewed lifestyle changes, again, reiterating decreasing carbohydrate intake.  Would also recommend she be seen by diabetic educator for further education, teaching in terms of how to use my nutrition, we will begin Metformin 500 mg daily.  Discussed possible adverse medication.  Will check labs as above.  Also recommend patient start daily aspirin.  Referral to ophthalmology was also given to patient.  -her blood pressure was mildly elevated today.  We discussed the option of starting lisinopril, though patient would prefer not to start another medication.  Will plan to recheck blood pressure at next visit, consider starting ACE-I then.  -At the end of our visit, patient also mentioned she had pain in her right fourth digit PIP joint after she accidentally hit her hand quite hard on a door.  There are some mild swelling, though normal handgrip strength.  I did offer to do x-ray, the patient declined.  Discussed home cares, including ice.      ADHD: Patient is currently taking Concerta, she did not need refills today.  Plan to have med check at least once every 6 months      Return in about 3 months (around 8/19/2021) for Diabetes Follow-up; Concerta follow-up.      40 minutes spent on the date of the encounter doing chart review,  interpretation of tests, patient visit and documentation     Subjective:      HPI: Teetee Alvarado is a 36 y.o. female who presents for discussing new diagnosis of type 2 diabetes.  Briefly, at her last visit with me we checked fasting labs and she had elevated fasting glucose of 162.  Her hemoglobin A1c was 7.3.  Patient denies any symptoms of polydipsia or polyuria.  Since I discussed with patient this new diagnosis of type 2 diabetes, she has tried to cut back on her carbohydrate intake.  Previously, she is eating a lot of pasta, sweets including candies and desserts.      Medical History:     Patient Active Problem List   Diagnosis     Attention deficit hyperactivity disorder (ADHD), predominantly inattentive type     Anxiety     PCOS (polycystic ovarian syndrome)     Class 3 severe obesity due to excess calories without serious comorbidity with body mass index (BMI) of 40.0 to 44.9 in adult (H)     Mild major depression (H)     Diabetes mellitus, type 2 (H)       History reviewed. No pertinent past medical history.    Past Surgical History:   Procedure Laterality Date     ABSCESS DRAINAGE Left     left leg     LASIK       WISDOM TOOTH EXTRACTION         Current Medications:     Current Outpatient Medications   Medication Sig     ALPRAZolam (XANAX) 0.5 MG tablet Take 0.5-1 tablets (0.25-0.5 mg total) by mouth daily as needed for anxiety.     famotidine (PEPCID) 20 MG tablet TAKE 1 TABLET BY MOUTH 2 TIMES A DAY AS NEEDED FOR HEARTBURN.     methylphenidate HCl (CONCERTA) 36 MG CR tablet Take 2 tablets (72 mg total) by mouth daily.     multivitamin (ONE A DAY) per tablet Take 1 tablet by mouth daily.     sertraline (ZOLOFT) 100 MG tablet Take 1 tablet (100 mg total) by mouth daily.     aspirin 81 MG EC tablet Take 1 tablet (81 mg total) by mouth daily.     metFORMIN (GLUCOPHAGE) 500 MG tablet Take 1 tablet (500 mg total) by mouth daily with breakfast.       Family History:     Family History   Problem Relation Age of  "Onset     Diabetes type II Father      Arthritis Father        Review of Systems  All other systems reviewed and are negative.         Social History:     Social History     Tobacco Use   Smoking Status Current Every Day Smoker     Packs/day: 0.00   Smokeless Tobacco Never Used   Tobacco Comment    vaping-daily     Social History     Social History Narrative    Lives with cat.  Works at  [employer]         Objective:     /89 (Patient Site: Left Arm, Patient Position: Sitting, Cuff Size: Adult Large)   Pulse 74   Ht 5' 5\" (1.651 m)   Wt (!) 286 lb 6.4 oz (129.9 kg)   LMP  (LMP Unknown) Comment: PCOS  Breastfeeding No   BMI 47.66 kg/m    Physical Examination: General appearance - alert, well appearing, and in no distress  Eyes: extraocular eye movements intact  Neck: supple, no significant adenopathy or thyromegaly  Lungs: clear to auscultation, no wheezes, rales or rhonchi, symmetric air entry  Heart: normal rate, regular rhythm, normal S1, S2, no murmurs.  Abdomen: soft, nontender, nondistended, no masses or organomegaly  Neurological: alert, oriented, normal speech, no focal findings or movement disorder noted.    Extremities: No edema, no clubbing or cyanosis.  Normal monofilament test.  Psychiatric: Normal affect. Does not appear anxious or depressed.    No results found for this or any previous visit (from the past 168 hour(s)).      Ashley Lora MD  5/19/2021  8:11 PM  Disclaimer: This note was dictated using speech recognition software. Minor errors in transcription may be present.        "

## 2021-06-17 NOTE — TELEPHONE ENCOUNTER
Reason for Call:  Medication or medication refill:    Do you use a [facility]?  Name of the pharmacy and phone number for the current request: [facility] #[id number] - [place], [place] - [address]    Name of the medication requested:   - methylphenidate HCl (CONCERTA) 36 MG CR tablet  - famotidine (PEPCID) 20 MG tablet    - sertraline (ZOLOFT) 50 MG tablet -- patient states that Dr. Lora and pt discussed about taking 100 mg and pt tried it and it works better than the 75 mg. Patient is wondering if Dr. Lora can send rx for 100 mg.     Other request: NA    Can we leave a detailed message on this number? Yes    Phone number patient can be reached at:   Cell number on file:    Telephone Information:   Mobile [phone]       Best Time: Any time    Call taken on 4/28/2021 at 10:41 AM by Vanna Amado

## 2021-06-18 NOTE — PATIENT INSTRUCTIONS - HE
Patient Instructions by Ashley Lora MD at 5/3/2021  2:20 PM     Author: Ashley Lora MD Service: -- Author Type: Physician    Filed: 5/3/2021  2:37 PM Encounter Date: 5/3/2021 Status: Signed    : Ashley Lora MD (Physician)         Patient Education   Understanding USDA MyPlate  The USDA (US Department of Agriculture) has guidelines to help you make healthy food choices. These are called MyPlate. MyPlate shows the food groups that make up healthy meals using the image of a place setting. Before you eat, think about the healthiest choices for what to put onto your plate or into your cup or bowl. To learn more about building a healthy plate, visit www.choosemyplate.gov.       The Food Groups    Fruits: Any fruit or 100% fruit juice counts as part of the Fruit Group. Fruits may be fresh, canned, frozen, or dried, and may be whole, cut-up, or pureed. Make half your plate fruits and vegetables.    Vegetables: Any vegetable or 100% vegetable juice counts as a member of the Vegetable Group. Vegetables may be fresh, frozen, canned, or dried. They can be served raw or cooked and may be whole, cut-up, or mashed. Make half your plate fruits and vegetables.     Grains: All foods made from grains are part of the Grains Group. These include wheat, rice, oats, cornmeal, and barley such as bread, pasta, oatmeal, cereal, tortillas, and grits. Grains should be no more than a quarter of your plate. At least half of your grains should be whole grains.    Protein: This group includes meat, poultry, seafood, beans and peas, eggs, processed soy products (like tofu), nuts (including nut butters), and seeds. Make protein choices no more than a quarter of your plate. Meat and poultry choices should be lean or low fat.    Dairy: All fluid milk products and foods made from milk that contain calcium, like yogurt and cheese are part of the Dairy Group. (Foods that have little calcium, such as cream, butter, and cream cheese,  are not part of the group.) Most dairy choices should be low-fat or fat-free.    Oils: These are fats that are liquid at room temperature. They include canola, corn, olive, soybean, and sunflower oil. Foods that are mainly oil include mayonnaise, certain salad dressings, and soft margarines. You should have only 5 to 7 teaspoons of oils a day. You probably already get this much from the food you eat.  Use [employer] to Help Build Your Meals  The [employer] can help you plan and track your meals and activity. You can look up individual foods to see or compare their nutritional value. You can get guidelines for what and how much you should eat. You can compare your food choices. And you can assess personal physical activities and see ways you can improve. Go to www.[employer].gov/supertracker/.    0240-1867 [employer]. [address] [place] [address], [place] [address]. All rights reserved. This information is not intended as a substitute for professional medical care. Always follow your healthcare professional's instructions.

## 2021-06-19 NOTE — LETTER
Letter by Ashley Lora MD at      Author: Ashley Lora MD Service: -- Author Type: --    Filed:  Encounter Date: 9/17/2019 Status: (Other)         [facility]  09/17/19    Patient: Teetee Alvarado  YOB: 1985  Medical Record Number: [id number]  CSN: [id number]                                                                              Non-opioid Controlled Substance Agreement    I understand that my care provider has prescribed a controlled substance to help manage my condition(s). I am taking this medicine to help me function or work. I know this is strong medicine, and that it can cause serious side effects. Controlled substances can be sedating, addicting and may cause a dependency on the drug. They can affect my ability to drive or think, and cause depression. They need to be taken exactly as prescribed. Combining controlled substances with certain medicines or chemicals (such as cocaine, sedatives and tranquilizers, sleeping pills, meth) can be dangerous or even fatal. Also, if I stop controlled substances suddenly, I may have severe withdrawal symptoms.  If not helpful, I may be asked to stop them.    The risks, benefits, and side effects of these medicine(s) were explained to me. I agree that:    1. I will take part in other treatments as advised by my care team. This may be psychiatry or counseling, physical therapy, behavioral therapy, group treatment or a referral to a pain clinic. I will reduce or stop my medicine when my care team tells me to do so.  2. I will take my medicines as prescribed. I will not change the dose or schedule unless my care team tells me to. There will be no refills if I run out early.  I may be contactedwithout warning and asked to complete a urine drug test or pill count at any time.   3. I will keep all my appointments, and understand this is part of the monitoring of controlled substances. My care team may require an office visit for EVERY  controlled substance refill. If I miss appointments or dont follow instructions, my care team may stop my medicine.  4. I will not ask other providers to prescribe controlled substances, and I will not accept controlled substances from other people. If I need another prescribed controlled substance for a new reason, I will tell my care team within 1 business day.  5. I will use one pharmacy to fill all of my controlled substance prescriptions, and it is up to me to make sure that I do not run out of my medicines on weekends or holidays. If my care team is willing to refill my controlled substance prescription without a visit, I must request refills only during office hours, refills may take up to 3 days to process, and it may take up to 5 to 7 days for my medicine to be mailed and ready at my pharmacy. Prescriptions will not be mailed anywhere except my pharmacy.    6. I am responsible for my prescriptions. If the medicine/prescription is lost or stolen, it will not be replaced. I also agree not to share controlled substance medicines with anyone.          [facility]  09/17/19  Patient:  Teetee Alvarado  YOB: 1985  Medical Record Number: [id number]  CSN: [id number]    7. I agree to not use ANY illegal or recreational drugs. This includes marijuana, cocaine, bath salts or other drugs. I agree not to use alcohol unless my care team says I may. I agree to give urine samples whenever asked. If I dont give a urine sample, the care team may stop my medicine.    8. If I enroll in the [place] Medical Marijuana program, I will tell my care team. I will also sign an agreement to share my medical records with my care team.    9. I will bring in my list of medicines (or my medicine bottles) each time I come to the clinic.   10. I will tell my care team right away if I become pregnant or have a new medical problem treated outside of my regular clinic.  11. I understand that this medicine can affect  my thinking and judgment. It may be unsafe for me to drive, use machinery and do dangerous tasks. I will not do any of these things until I know how the medicine affects me. If my dose changes, I will wait to see how it affects me. I will contact my care team if I have concerns about medicine side effects.    I understand that if I do not follow any of the conditions above, my prescriptions or treatment may be stopped.      I agree that my provider, clinic care team, and pharmacy may work with any city, state or federal law enforcement agency that investigates the misuse, sale, or other diversion of my controlled medicine. I will allow my provider to discuss my care with or share a copy of this agreement with any other treating provider, pharmacy or emergency room where I receive care. I agree to give up (waive) any right of privacy or confidentiality with respect to these consents.   I have read this agreement and have asked questions about anything I did not understand.    ___________________________________________________________________________  Patient signature - Date/Time  -Teetee Alvarado                                      ___________________________________________________________________________  Witness signature                                                                    ___________________________________________________________________________  Provider signature- Ashley Lora MD

## 2021-06-20 NOTE — PROGRESS NOTES
1. Attention deficit hyperactivity disorder (ADHD), predominantly inattentive type     2. Anxiety          Medications Ordered   Medications     methylphenidate HCl (CONCERTA) 36 MG CR tablet     Sig: Take 1 tablet (36 mg total) by mouth daily.     Dispense:  30 tablet     Refill:  0        Plan: I did start her on some methylphenidate 36 mg once daily.  She has had a workup before and has been diagnosed with ADHD, and has taken methylphenidate in the past with good success.  We will start her on this dose, and have her return to see someone here in about 4-6 weeks, and see how she is doing on that medication and dose.  She has enough of the alprazolam to get her through a few more weeks, so she is not requesting a refill of that today.  We discussed this being a as needed medication, not something she would be on regularly, and if anxiety becomes an issue where she needs a daily medication we would change her to something slightly different in a different medication class that is not controlled her addicting.  She understands this.    30 minutes spent together with the patient today, more than 50% spent in counseling, discussing the above topics.        Subjective: 33-year-old female who is here today as a new patient to our clinic.  She has a history of ADHD and anxiety.  She states that she has really had a stressful month or so in her life.  She relates a story which is confirmed with her care everywhere notes, that she has [facility] the [facility] with a infection in her groin area that actually needed to be incised and drained and required her to stay overnight in the hospital.  She was on a couple of different antibiotics for this, and it sounds like it is now starting to settle out.  She is no longer on antibiotics and she states that it is still irritating and bothersome to her at times but the infection and redness and drainage have resolved and gone away.  Because of this, she  has not been able to work, and she is having financial issues because of that, and is hoping to very soon get back to work, but is having a lot of anxiety around her finances because of this hospital stay.    She has been on alprazolam recently, and this is helped her with her anxiety and this helped her get to sleep.  We discussed this being a short-term medication in my eyes, and if the anxiety persists she will need to be on something a little different.  He thinks that once she can get her job back going again, and her finances in order that the anxiety will improve.    She also has had ADHD in her past, and has been seen by psychiatry and diagnosed with it.  This is been really kind of a lifelong bit for her and she has been on any medicines for it in the past.  She has been on Concerta and some others, and is unsure which of these worked well for her but they all did in some way help her.  She is hoping to get on something for that as well and she thinks that that will alleviate her anxiety also.    Patient is new patient to the clinic. Please see past medical history, surgical history, social history and family history, all of which were completed in their entirety today.     A comprehensive Review of Systems was performed, and other than the positives mentioned above, the ROS was negative.

## 2021-06-21 NOTE — PROGRESS NOTES
Assessment / Impression     1. Attention deficit hyperactivity disorder (ADHD), predominantly inattentive type     2. Anxiety         Plan:     Discussed with patient that sometimes when anxiety is not controlled, this may give the appearance that her ADHD is not controlled.  For this reason, would recommend starting daily SSRI medication.  Patient is an agreeable to plan.  Will start sertraline 25 mg daily for 1 week, then increase to 50 mg daily.  Discussed possible side effects of medication as well as timing of effectiveness.  In regard to her ADHD, can also increase Concerta to 54 mg CR daily to see if this also helps.  Patient did state that when she was previously on the 54 mg that was when she found ADHD symptoms managed.  Follow-up in 4-6 weeks.  ADHD controlled substance agreement was signed today, this will be scanned to her chart.    Of note patient states that she plans [facility], though she was told to have mental health cared for here at [facility], therefore appears that I will be managing her medications for ADHD and anxiety only.    TT 25 minutes CT 15 minutes reviewing treatment plan as outlined above.    Subjective:      HPI: Teetee Alvarado is a 33 y.o. female, new to me, who presents for ADHD follow-up.  Briefly, she was seen for the first time in our clinic by Dr. Phillips approximately 1 month ago.  Patient states that she has had ADHD eval previously, had been on medications until approximately age 21.  She did not have health insurance for several years, and finally did get insurance, therefore last month she was restarted on Concerta 36 mg daily.  She felt like medication may be helped 1-2 days, however, has not noted any improvement.  She still feels significant difficulty focusing when she is at work.  She does also feel that her anxiety is relatively high, especially after she had recent perianal abscess and is in need of further workup.  This has made her  "quite anxious as well.  She has been using Xanax 1-2 tablets approximately 2 times a week to help with her anxiety.  Aside from benzodiazepine she has never been on any other medication for anxiety.  No suicidal or homicidal ideation.      Medical History:     Patient Active Problem List   Diagnosis     Attention deficit hyperactivity disorder (ADHD), predominantly inattentive type     Anxiety     PCOS (polycystic ovarian syndrome)       History reviewed. No pertinent past medical history.    History reviewed. No pertinent surgical history.    Current Medications:     Current Outpatient Prescriptions   Medication Sig Note     ALPRAZolam (XANAX) 0.5 MG tablet Take 0.25 mg by mouth.      amoxicillin-clavulanate (AUGMENTIN) 875-125 mg per tablet Take by mouth. 11/5/2018: Received from: [facility] Received Sig: Take 1 Tablet by mouth two times a day for 7 days.     methylphenidate HCl (CONCERTA) 54 MG CR tablet Take 1 tablet (54 mg total) by mouth daily.      sertraline (ZOLOFT) 50 MG tablet Take 1/2 tab daily for 1 week, then 1 tab daily.        Family History:   History reviewed. No pertinent family history.    Review of Systems  All other systems reviewed and are negative.         Social History:     History   Smoking Status     Former Smoker   Smokeless Tobacco     Current User     Social History     Social History Narrative         Objective:     /80  Pulse 80  Resp 16  Ht 5' 4.75\" (1.645 m)  Wt (!) 279 lb (126.6 kg)  BMI 46.79 kg/m2  Physical Examination: General appearance - alert, well appearing, and in no distress  Eyes: pupils equal and reactive, extraocular eye movements intact    Psychiatric: Normal affect.  Appears mildly anxious.  Normal speech pattern.  Maintains eye contact.  PHQ-9 = 9.  JAE-7 = 15.  See flow sheet for details.  Well-groomed.    No results found for this or any previous visit (from the past 168 hour(s)).      Ashley Lora MD  11/5/2018  8:42 AM        "

## 2021-06-21 NOTE — LETTER
Letter by Ashley Lora MD at      Author: Ashley Lora MD Service: -- Author Type: --    Filed:  Encounter Date: 1/7/2021 Status: (Other)         [facility]  01/07/21    Patient: Teetee Alvarado  YOB: 1985  Medical Record Number: [id number]  CSN: [id number]                                                                              Non-opioid Controlled Substance Agreement    I understand that my care provider has prescribed a controlled substance to help manage my condition(s). I am taking this medicine to help me function or work. I know this is strong medicine, and that it can cause serious side effects. Controlled substances can be sedating, addicting and may cause a dependency on the drug. They can affect my ability to drive or think, and cause depression. They need to be taken exactly as prescribed. Combining controlled substances with certain medicines or chemicals (such as cocaine, sedatives and tranquilizers, sleeping pills, meth) can be dangerous or even fatal. Also, if I stop controlled substances suddenly, I may have severe withdrawal symptoms.  If not helpful, I may be asked to stop them.    The risks, benefits, and side effects of these medicine(s) were explained to me. I agree that:    1. I will take part in other treatments as advised by my care team. This may be psychiatry or counseling, physical therapy, behavioral therapy, group treatment or a referral to a pain clinic. I will reduce or stop my medicine when my care team tells me to do so.  2. I will take my medicines as prescribed. I will not change the dose or schedule unless my care team tells me to. There will be no refills if I run out early.  I may be contactedwithout warning and asked to complete a urine drug test or pill count at any time.   3. I will keep all my appointments, and understand this is part of the monitoring of controlled substances. My care team may require an office visit for  EVERY controlled substance refill. If I miss appointments or dont follow instructions, my care team may stop my medicine.  4. I will not ask other providers to prescribe controlled substances, and I will not accept controlled substances from other people. If I need another prescribed controlled substance for a new reason, I will tell my care team within 1 business day.  5. I will use one pharmacy to fill all of my controlled substance prescriptions, and it is up to me to make sure that I do not run out of my medicines on weekends or holidays. If my care team is willing to refill my controlled substance prescription without a visit, I must request refills only during office hours, refills may take up to 3 days to process, and it may take up to 5 to 7 days for my medicine to be mailed and ready at my pharmacy. Prescriptions will not be mailed anywhere except my pharmacy.    6. I am responsible for my prescriptions. If the medicine/prescription is lost or stolen, it will not be replaced. I also agree not to share controlled substance medicines with anyone.          [facility]  01/07/21  Patient:  Teetee Alvarado  YOB: 1985  Medical Record Number: [id number]  CSN: [id number]    7. I agree to not use ANY illegal or recreational drugs. This includes marijuana, cocaine, bath salts or other drugs. I agree not to use alcohol unless my care team says I may. I agree to give urine samples whenever asked. If I dont give a urine sample, the care team may stop my medicine.    8. If I enroll in the [place] Medical Marijuana program, I will tell my care team. I will also sign an agreement to share my medical records with my care team.    9. I will bring in my list of medicines (or my medicine bottles) each time I come to the clinic.   10. I will tell my care team right away if I become pregnant or have a new medical problem treated outside of my regular clinic.  11. I understand that this  medicine can affect my thinking and judgment. It may be unsafe for me to drive, use machinery and do dangerous tasks. I will not do any of these things until I know how the medicine affects me. If my dose changes, I will wait to see how it affects me. I will contact my care team if I have concerns about medicine side effects.    I understand that if I do not follow any of the conditions above, my prescriptions or treatment may be stopped.      I agree that my provider, clinic care team, and pharmacy may work with any city, state or federal law enforcement agency that investigates the misuse, sale, or other diversion of my controlled medicine. I will allow my provider to discuss my care with or share a copy of this agreement with any other treating provider, pharmacy or emergency room where I receive care. I agree to give up (waive) any right of privacy or confidentiality with respect to these consents.   I have read this agreement and have asked questions about anything I did not understand.    ___________________________________________________________________________  Patient signature - Date/Time  -Teetee Alvarado                                      ___________________________________________________________________________  Witness signature                                                                    ___________________________________________________________________________  Provider signature- Ashley Lora MD

## 2021-06-22 NOTE — PROGRESS NOTES
Assessment / Impression     1. Attention deficit hyperactivity disorder (ADHD), predominantly inattentive type     2. Anxiety             Plan:     She is doing well with both medications at this time.  She was given 3 one-month prescriptions of Concerta 36 mg to take 2 tabs daily #60 tabs.  If things are going well, she may call for the next 3-month prescriptions, though I did discuss with patient that she needs to follow-up every 6 months.      Anxiety management is also going well, she will continue sertraline 50 mg daily, she did not need refills today.    Follow-up in 6 months.    Subjective:      HPI: Teetee Alvarado is a 33 y.o. female who presents for ADHD and anxiety follow-up.  Since her last visit with me she is doing very well.  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  She denies any adverse effects of her consider aside from some mild appetite suppression.  No palpitations.  She is doing well with her sertraline.      Medical History:     Patient Active Problem List   Diagnosis     Attention deficit hyperactivity disorder (ADHD), predominantly inattentive type     Anxiety     PCOS (polycystic ovarian syndrome)       History reviewed. No pertinent past medical history.    History reviewed. No pertinent surgical history.    Current Medications:     Current Outpatient Medications   Medication Sig     ALPRAZolam (XANAX) 0.5 MG tablet Take 0.25 mg by mouth.     ibuprofen (ADVIL,MOTRIN) 200 MG tablet Take 600 mg by mouth daily as needed.     [START ON 3/5/2019] methylphenidate HCl (CONCERTA) 36 MG CR tablet Take 2 tablets (72 mg total) by mouth daily.     multivitamin (ONE A DAY) per tablet Take 1 tablet by mouth daily.     polyethylene glycol (MIRALAX) 17 gram packet Take 17 g by mouth daily.     polyethylene glycol (MIRALAX) 17 gram packet TAKE 1 PACKET BY MOUTH DAILY. HOLD FOR LOOSE STOOLS     sertraline (ZOLOFT) 50 MG tablet Take 1 tablet (50 mg total) by  "mouth daily Needs appointment before additional refills..     traMADol (ULTRAM) 50 mg tablet Take 50 mg by mouth every 6 (six) hours as needed.     [START ON 2/3/2019] methylphenidate HCl (CONCERTA) 36 MG CR tablet Take 1 tablet (36 mg total) by mouth daily.     methylphenidate HCl (CONCERTA) 36 MG CR tablet Take 1 tablet (36 mg total) by mouth daily.       Family History:   History reviewed. No pertinent family history.    Review of Systems  All other systems reviewed and are negative.         Social History:     Social History     Tobacco Use   Smoking Status Former Smoker   Smokeless Tobacco Current User     Social History     Social History Narrative     Not on file         Objective:     /80 (Patient Site: Right Arm, Patient Position: Sitting, Cuff Size: Adult Large)   Pulse 84   Ht 5' 5\" (1.651 m)   Wt (!) 264 lb 1.6 oz (119.8 kg)   LMP 11/28/2018 (Approximate)   Breastfeeding? No   BMI 43.95 kg/m    Physical Examination: General appearance - alert, well appearing, and in no distress  Eyes: pupils equal and reactive, extraocular eye movements intact  Neurological: alert, oriented, normal speech, no focal findings or movement disorder noted.    Extremities: No edema, no clubbing or cyanosis  Psychiatric: Normal affect. Does not appear anxious or depressed.  Normal speech pattern.  Maintains eye contact.  PHQ-9 = 10.  JAE-7 = 6.  See flow sheet for details.    No results found for this or any previous visit (from the past 168 hour(s)).      Ashley Lora MD  1/4/2019  8:49 AM        "

## 2021-06-23 NOTE — TELEPHONE ENCOUNTER
rx was to be for 72 mg.  Wrong dosage sent to pharmacy.  Last ov 1/4/19.  F/u in 6 months  Philly Reddy

## 2021-06-23 NOTE — TELEPHONE ENCOUNTER
Viewing the prescriptions I see that the 01/04/2019 RX was 36mg once daily. The 02/03/2019 RX was 36mg once daily, and the 03/05/2019 RX was 36mg Twice Daily.    Please Review and Advise on the correct dosages the patient should be taking and send new prescriptions if any are incorrect.    Thank You,  Ashtyn Johnson CMA

## 2021-06-23 NOTE — TELEPHONE ENCOUNTER
Patient states that her Methylphenidate dosage was increased to 72 mg. She picked it up 1/4/2019. Next 2 RXs state to take 1 tablet=36 mg daily, which patient states is incorrect. .    Message will be forwarded to Dr CHARLENE Lora @ [facility] clinic. Please have her resend RXs x 2  with 72 mg dose strength. Thank you.     Jannette Wallace RN [facility] Triage Nurse

## 2021-06-23 NOTE — TELEPHONE ENCOUNTER
Patient Returning Call  Reason for call:  Patient calling back to see if a new script has been sent in to cover the next two months.  Please expedite  Information relayed to patient:   n/a  Patient has additional questions:  Yes  If YES, what are your questions/concerns:   Please send new script over to the pharmacy [facility]    Okay to leave a detailed message?: No call back needed

## 2021-06-23 NOTE — TELEPHONE ENCOUNTER
Reason contacted:  rx  Information relayed:  Patient is fine waiting until dr bates is in clinic to fill rx.    Additional questions:  No  Further follow-up needed:  No  Okay to leave a detailed message:  Shirley Reddy

## 2021-06-24 NOTE — TELEPHONE ENCOUNTER
Medication Request  Medication name: Alprazolam 0.5 mg tab   Pharmacy Name and Location: [facility] pharmacy   Reason for request: historical medication   When did you use medication last?:  Dr. Primo Bedoya with [facility]   Patient offered appointment:  patient declined  Okay to leave a detailed message: yes

## 2021-06-25 NOTE — TELEPHONE ENCOUNTER
Reason for Call:  Medication or medication refill:    Do you use a [facility]?  Name of the pharmacy and phone number for the current request: [facility]/PHARMACY #[id number] - [facility], [place] - [address]    Name of the medication requested: methylphenidate HCl (CONCERTA) 36 MG CR tablet    Other request: na    Can we leave a detailed message on this number? Yes    Phone number patient can be reached at:   Cell number on file:    Telephone Information:   Mobile [phone]       Best Time: na    Call taken on 6/11/2021 at 12:18 PM by Dione Hameed

## 2021-06-30 NOTE — PROGRESS NOTES
Progress Notes by Ashley Lora MD at 5/3/2021  2:20 PM     Author: Ashley Lora MD Service: -- Author Type: Physician    Filed: 5/3/2021  4:47 PM Encounter Date: 5/3/2021 Status: Signed    : Ashley Lora MD (Physician)       FEMALE PREVENTATIVE EXAM    Assessment and Plan:     Patient has been advised of split billing requirements and indicates understanding: No    1. Encounter for general adult medical examination without abnormal findings  Discussed one-time HIV and hepatitis C screening, ordered today, will inform patient of results.  - HIV Antigen/Antibody Screening Cascade  - Hepatitis C Antibody (Anti-HCV) (pts born [id number])    2. Anxiety  Refills of her sertraline 100 mg daily were given to patient today, as patient feels this dose has been most helpful for her.  Anxiety is controlled at this time.  - sertraline (ZOLOFT) 100 MG tablet; Take 1 tablet (100 mg total) by mouth daily.  Dispense: 90 tablet; Refill: 3    3. Lipoma of skin and subcutaneous tissue  Discussed with patient finding on exam today, lesion appears benign and currently not bothersome.  Certainly if she does note symptoms or if lesion changes in size, she will let me know.    4. Impaired fasting glucose  After patient left, I did see her elevated fasting glucose of 162.  For this reason, recommend recheck hemoglobin A1c, patient likely has diabetes.  Will wait for her hemoglobin A1c, plan to have patient return to clinic to discuss new diagnosis.  - Glycosylated Hemoglobin A1c    5. Encounter for screening for diabetes mellitus  6. Encounter for screening for lipoid disorders  Fasting today, checked labs.  - Glucose  - Lipid Cascade- FASTING     Next follow up:  Return in about 2 weeks (around 5/17/2021) for Follow Up lab results.    Immunization Review  Adult Imm Review: No immunizations due today  BMI: Discussed with patient lifestyle changes      I discussed the following with the patient:   Adult Healthy Living:  Importance of regular exercise  Healthy nutrition    I have had an Advance Directives discussion with the patient.    Subjective:   Chief Complaint: Teetee Alvarado is an 36 y.o. female here for a preventative health visit.    HPI: She recently got refills of her Concerta, sertraline, Pepcid, Xanax from me.      She noted a small bump on her left trunk, not particularly bothersome, only if she presses it, notices it only when she is standing.  No associated symptoms such as fevers, chills, sweats, unexplained weight changes.    Healthy Habits  Are you taking a daily aspirin? No  Do you typically exercising at least 40 min, 3-4 times per week?  NO  Do you usually eat at least 4 servings of fruit and vegetables a day, include whole grains and fiber and avoid regularly eating high fat foods? NO  Have you had an eye exam in the past two years? Yes  Do you see a dentist twice per year? NO  Do you have any concerns regarding sleep? YES - possible sleep apnea?    Safety Screen  If you own firearms, are they secured in a locked gun cabinet or with trigger locks? The patient does not own any firearms  Do you feel you are safe where you are living?: Yes (5/3/2021  2:17 PM)  Do you feel you are safe in your relationship(s)?: Yes (5/3/2021  2:17 PM)      Review of Systems:  Please see above.  The rest of the review of systems are negative for all systems.     Pap History:   No - age 30-65 PAP every 3 years recommended  Cancer Screening       Status Date      PAP SMEAR Next Due 9/24/2021      Done 9/24/2018      Patient has more history with this topic...          Patient Care Team:  Ashley Lora MD as PCP - General (Family Medicine)  Ashley Lora MD as Assigned PCP        History     Reviewed By Date/Time Sections Reviewed    Ashley Lora MD 5/3/2021  2:33 PM Tobacco, Alcohol, Drug Use, Social Documentation    Ashley Lora MD 5/3/2021  2:32 PM Family    Ashley Lora MD 5/3/2021  2:31 PM Medical, Surgical,  "Family    Veronica Peterson, DEBORAH 5/3/2021  2:16 PM Tobacco            Objective:   Vital Signs:   Visit Vitals  /90 (Patient Site: Left Arm, Patient Position: Sitting, Cuff Size: Adult Large)   Pulse 83   Ht 5' 5\" (1.651 m)   Wt (!) 286 lb 12.8 oz (130.1 kg)   BMI 47.73 kg/m           PHYSICAL EXAM  General Appearance: Alert, cooperative, no distress, appears stated age  Head: Normocephalic, without obvious abnormality, atraumatic  Eyes: PERRL, conjunctiva/corneas clear, EOM's intact  Ears: Normal TM's and external ear canals, both ears  Neck: Supple, symmetrical, trachea midline, no adenopathy;  thyroid: not enlarged, symmetric, no tenderness/mass/nodules;   Back: Symmetric, no curvature, ROM normal, no CVA tenderness  Lungs: Clear to auscultation bilaterally, respirations unlabored  Breasts: No breast masses, tenderness, asymmetry, or nipple discharge.  Heart: Regular rate and rhythm, no murmur.   Abdomen: Soft, non-tender, bowel sounds active all four quadrants,  no masses, no organomegaly.  There is an approximately 1cm mobile papule noted in left trunk, nontender.  Pelvic:Not examined  Extremities: Extremities normal, atraumatic, no cyanosis or edema  Skin: Skin color, texture, turgor normal, no rashes or lesions  Lymph nodes: Cervical, supraclavicular, and axillary nodes normal  Neurologic: Alert.   Psych: Normal affect.  Does not appear anxious or depressed.               Medication List          Accurate as of May 3, 2021  4:46 PM. If you have any questions, ask your nurse or doctor.            CHANGE how you take these medications    sertraline 100 MG tablet  Also known as: ZOLOFT  INSTRUCTIONS: Take 1 tablet (100 mg total) by mouth daily.  What changed: additional instructions  Changed by: Ashley Lora MD           CONTINUE taking these medications    ALPRAZolam 0.5 MG tablet  Also known as: XANAX  INSTRUCTIONS: Take 0.5-1 tablets (0.25-0.5 mg total) by mouth daily as needed for anxiety.      "   famotidine 20 MG tablet  Also known as: PEPCID  INSTRUCTIONS: TAKE 1 TABLET BY MOUTH 2 TIMES A DAY AS NEEDED FOR HEARTBURN.        methylphenidate HCl 36 MG CR tablet  Also known as: Concerta  INSTRUCTIONS: Take 2 tablets (72 mg total) by mouth daily.        multivitamin per tablet  Also known as: ONE A DAY  INSTRUCTIONS: Take 1 tablet by mouth daily.              Where to Get Your Medications      These medications were sent to [facility]/pharmacy #[id number] - [place], [place] - [address] [address]    Phone: [phone]     sertraline 100 MG tablet         Additional Screenings Completed Today:     The patient was counseled and encouraged to consider modifying their diet and eating habits. She was provided with information on recommended healthy diet options.

## 2021-07-09 ENCOUNTER — COMMUNICATION - HEALTHEAST (OUTPATIENT)
Dept: FAMILY MEDICINE | Facility: CLINIC | Age: 36
End: 2021-07-09

## 2021-07-09 DIAGNOSIS — E11.9 TYPE 2 DIABETES MELLITUS WITHOUT COMPLICATION, WITHOUT LONG-TERM CURRENT USE OF INSULIN (H): ICD-10-CM

## 2021-07-09 NOTE — TELEPHONE ENCOUNTER
Telephone Encounter by Bryson Delgadillo, RN at 7/9/2021  2:52 PM     Author: Bryson Delgadillo, RN Service: -- Author Type: Registered Nurse    Filed: 7/9/2021  2:52 PM Encounter Date: 7/9/2021 Status: Signed    : Bryson Delgadillo, RN (Registered Nurse)       RN cannot approve Refill Request    RN can NOT refill this medication   Patient request early refill. Medication last filled 5/19/21 for qty 90 refill 0. Provider to advise on request. . Last office visit: 5/19/2021 Ashley Lora MD Last Physical: 5/3/2021 Last MTM visit: Visit date not found Last visit same specialty: 5/19/2021 Ashley Lora MD.  Next visit within 3 mo: Visit date not found  Next physical within 3 mo: Visit date not found      Bryson Delgadillo, [facility] Triage/Med Refill 7/9/2021    Requested Prescriptions   Pending Prescriptions Disp Refills   ? metFORMIN (GLUCOPHAGE) 500 MG tablet [Pharmacy Med Name: METFORMIN  MG TABLET] 30 tablet 2     Sig: TAKE 1 TABLET BY MOUTH EVERY DAY WITH BREAKFAST       Metformin Refill Protocol Passed - 7/9/2021  2:42 PM        Passed - Blood pressure in last 12 months     BP Readings from Last 1 Encounters:   05/19/21 137/89             Passed - LFT or AST or ALT in last 12 months     Albumin   Date Value Ref Range Status   05/19/2021 3.9 3.5 - 5.0 g/dL Final     Bilirubin, Total   Date Value Ref Range Status   05/19/2021 0.4 0.0 - 1.0 mg/dL Final     Alkaline Phosphatase   Date Value Ref Range Status   05/19/2021 68 45 - 120 U/L Final     AST   Date Value Ref Range Status   05/19/2021 30 0 - 40 U/L Final     ALT   Date Value Ref Range Status   05/19/2021 45 0 - 45 U/L Final     Protein, Total   Date Value Ref Range Status   05/19/2021 6.8 6.0 - 8.0 g/dL Final                Passed - GFR or Serum Creatinine in last 6 months     GFR MDRD Non Af Amer   Date Value Ref Range Status   05/19/2021 >60 >60 mL/min/1.73m2 Final     GFR MDRD Af Amer   Date Value Ref Range Status   05/19/2021 >60 >60 mL/min/1.73m2  Final             Passed - Visit with PCP or prescribing provider visit in last 6 months or next 3 months     Last office visit with prescriber/PCP: 5/19/2021 OR same dept: 5/19/2021 Ashley Lora MD OR same specialty: 5/19/2021 Ashley Lora MD Last physical: 5/3/2021 Last MTM visit: Visit date not found         Next appt within 3 mo: Visit date not found  Next physical within 3 mo: Visit date not found  Prescriber OR PCP: Ashley Lora MD  Last diagnosis associated with med order: 1. Type 2 diabetes mellitus without complication, without long-term current use of insulin (H)  - metFORMIN (GLUCOPHAGE) 500 MG tablet [Pharmacy Med Name: METFORMIN  MG TABLET]; TAKE 1 TABLET BY MOUTH EVERY DAY WITH BREAKFAST  Dispense: 30 tablet; Refill: 2     If protocol passes may refill for 12 months if within 3 months of last provider visit (or a total of 15 months).           Passed - A1C in last 6 months     Hemoglobin A1c   Date Value Ref Range Status   05/03/2021 7.3 (H) <=5.6 % Final     Comment:     Prediabetes:   HBA1c       5.7 to 6.4%        Diabetes:        HBA1c        >=6.5%   Patients with Hgb F >5%, total bilirubin >10.0 mg/dL, abnormal red cell turnover, severe renal or hepatic disease or malignancy should not have this A1C method used to diagnose or monitor diabetes.                   Passed - Microalbumin in last year      Microalbumin, Random Urine   Date Value Ref Range Status   05/19/2021 1.03 0.00 - 1.99 mg/dL Final

## 2021-07-19 DIAGNOSIS — F90.0 ATTENTION DEFICIT HYPERACTIVITY DISORDER (ADHD), PREDOMINANTLY INATTENTIVE TYPE: ICD-10-CM

## 2021-07-19 RX ORDER — METHYLPHENIDATE HYDROCHLORIDE 36 MG/1
72 TABLET ORAL DAILY
Qty: 60 TABLET | Refills: 0 | Status: SHIPPED | OUTPATIENT
Start: 2021-07-19 | End: 2021-09-01

## 2021-07-19 NOTE — TELEPHONE ENCOUNTER
Please have patient make an establish care visit with new provider. Dr. Lora is no longer at clinic.

## 2021-08-21 ENCOUNTER — HEALTH MAINTENANCE LETTER (OUTPATIENT)
Age: 36
End: 2021-08-21

## 2021-09-01 DIAGNOSIS — E11.9 TYPE 2 DIABETES MELLITUS WITHOUT COMPLICATION, WITHOUT LONG-TERM CURRENT USE OF INSULIN (H): ICD-10-CM

## 2021-09-01 DIAGNOSIS — F41.9 ANXIETY: ICD-10-CM

## 2021-09-01 DIAGNOSIS — F90.0 ATTENTION DEFICIT HYPERACTIVITY DISORDER (ADHD), PREDOMINANTLY INATTENTIVE TYPE: ICD-10-CM

## 2021-09-01 RX ORDER — METHYLPHENIDATE HYDROCHLORIDE 36 MG/1
72 TABLET ORAL DAILY
Qty: 60 TABLET | Refills: 0 | Status: SHIPPED | OUTPATIENT
Start: 2021-09-01 | End: 2021-09-17

## 2021-09-01 RX ORDER — SERTRALINE HYDROCHLORIDE 100 MG/1
100 TABLET, FILM COATED ORAL DAILY
Qty: 30 TABLET | Refills: 0 | Status: SHIPPED | OUTPATIENT
Start: 2021-09-01 | End: 2022-05-30

## 2021-09-01 NOTE — TELEPHONE ENCOUNTER
LMTCB. Please ask pt which medications she is needing refilled now. Looks like she has est care appt on the 17th. Is she ok on her meds until then? Or what is she out of?

## 2021-09-01 NOTE — TELEPHONE ENCOUNTER
Patient returned call asking for refills of medications as she will run out prior to her appointment on 9/17. Closing encounter as a refill encounter has been opened regarding the medications needing to be refilled.

## 2021-09-01 NOTE — TELEPHONE ENCOUNTER
Reason for call:  Medication   If this is a refill request, has the caller requested the refill from the pharmacy already? Yes  Will the patient be using a [facility]? No  Name of the pharmacy and phone number for the current request:   [facility]    Name of the medication requested:   All refilled per patient      Other request: none    Phone number to reach patient:  Cell number on file:    Telephone Information:   Mobile [phone]       Best Time:  any    Can we leave a detailed message on this number?  YES    Travel screening: Not Applicable

## 2021-09-01 NOTE — TELEPHONE ENCOUNTER
Patient returned call from earlier. T'd up medications patient is requesting, patient does have an upcoming appointment to establish care with Dr. Frank. Please advise if refills can be given. Patient will run out before her appointment on 9/17, per patient.     Conversation: Refill Request  (Newest Message First)  Sahara Martinez MARIFER     KK    9/1/21 4:40 PM  Note     LMTCB. Please ask pt which medications she is needing refilled now. Looks like she has est care appt on the 17th. Is she ok on her meds until then? Or what is she out of?           PB    9/1/21 4:29 PM  Lesli Yates routed this conversation to Rosa Frank Care Team Lesli Kruger    9/1/21 4:21 PM  Note     Reason for call:  Medication   If this is a refill request, has the caller requested the refill from the pharmacy already? Yes  Will the patient be using a [facility]? No  Name of the pharmacy and phone number for the current request[facility]   [facility]     Name of the medication requested:   All refilled per patient        Other request: none     Phone number to reach patient:  Cell number on file:        Telephone Information:   Mobile [phone]         Best Time:  any     Can we leave a detailed message on this number?  YES     Travel screening: Not Applicable

## 2021-09-17 ENCOUNTER — OFFICE VISIT (OUTPATIENT)
Dept: FAMILY MEDICINE | Facility: CLINIC | Age: 36
End: 2021-09-17
Payer: COMMERCIAL

## 2021-09-17 VITALS
BODY MASS INDEX: 43.77 KG/M2 | SYSTOLIC BLOOD PRESSURE: 124 MMHG | WEIGHT: 263 LBS | HEART RATE: 74 BPM | DIASTOLIC BLOOD PRESSURE: 76 MMHG | RESPIRATION RATE: 16 BRPM

## 2021-09-17 DIAGNOSIS — F41.9 ANXIETY: ICD-10-CM

## 2021-09-17 DIAGNOSIS — F90.0 ATTENTION DEFICIT HYPERACTIVITY DISORDER (ADHD), PREDOMINANTLY INATTENTIVE TYPE: ICD-10-CM

## 2021-09-17 DIAGNOSIS — E28.2 PCOS (POLYCYSTIC OVARIAN SYNDROME): ICD-10-CM

## 2021-09-17 DIAGNOSIS — E66.813 CLASS 3 SEVERE OBESITY DUE TO EXCESS CALORIES WITHOUT SERIOUS COMORBIDITY WITH BODY MASS INDEX (BMI) OF 40.0 TO 44.9 IN ADULT (H): ICD-10-CM

## 2021-09-17 DIAGNOSIS — N94.6 DYSMENORRHEA: ICD-10-CM

## 2021-09-17 DIAGNOSIS — E11.9 TYPE 2 DIABETES MELLITUS WITHOUT COMPLICATION, WITHOUT LONG-TERM CURRENT USE OF INSULIN (H): Primary | ICD-10-CM

## 2021-09-17 DIAGNOSIS — E66.01 CLASS 3 SEVERE OBESITY DUE TO EXCESS CALORIES WITHOUT SERIOUS COMORBIDITY WITH BODY MASS INDEX (BMI) OF 40.0 TO 44.9 IN ADULT (H): ICD-10-CM

## 2021-09-17 DIAGNOSIS — Z23 NEED FOR PROPHYLACTIC VACCINATION AND INOCULATION AGAINST INFLUENZA: ICD-10-CM

## 2021-09-17 DIAGNOSIS — R55 VASOVAGAL SYNCOPE: ICD-10-CM

## 2021-09-17 PROBLEM — F32.0 MILD MAJOR DEPRESSION (H): Status: RESOLVED | Noted: 2021-02-11 | Resolved: 2021-09-17

## 2021-09-17 LAB — HBA1C MFR BLD: 5.6 % (ref 0–5.6)

## 2021-09-17 PROCEDURE — 90686 IIV4 VACC NO PRSV 0.5 ML IM: CPT | Performed by: FAMILY MEDICINE

## 2021-09-17 PROCEDURE — 36415 COLL VENOUS BLD VENIPUNCTURE: CPT | Performed by: FAMILY MEDICINE

## 2021-09-17 PROCEDURE — 90471 IMMUNIZATION ADMIN: CPT | Performed by: FAMILY MEDICINE

## 2021-09-17 PROCEDURE — 83036 HEMOGLOBIN GLYCOSYLATED A1C: CPT | Performed by: FAMILY MEDICINE

## 2021-09-17 PROCEDURE — 99214 OFFICE O/P EST MOD 30 MIN: CPT | Mod: 25 | Performed by: FAMILY MEDICINE

## 2021-09-17 RX ORDER — METFORMIN HCL 500 MG
500 TABLET, EXTENDED RELEASE 24 HR ORAL
Qty: 90 TABLET | Refills: 1 | Status: SHIPPED | OUTPATIENT
Start: 2021-09-17 | End: 2022-10-03

## 2021-09-17 RX ORDER — METHYLPHENIDATE HYDROCHLORIDE 36 MG/1
72 TABLET ORAL DAILY
Qty: 60 TABLET | Refills: 0 | Status: SHIPPED | OUTPATIENT
Start: 2021-11-03 | End: 2022-05-04

## 2021-09-17 RX ORDER — METHYLPHENIDATE HYDROCHLORIDE 36 MG/1
72 TABLET ORAL DAILY
Qty: 60 TABLET | Refills: 0 | Status: SHIPPED | OUTPATIENT
Start: 2021-12-04 | End: 2022-03-07

## 2021-09-17 RX ORDER — METHYLPHENIDATE HYDROCHLORIDE 36 MG/1
72 TABLET ORAL DAILY
Qty: 60 TABLET | Refills: 0 | Status: SHIPPED | OUTPATIENT
Start: 2021-10-03 | End: 2022-05-04

## 2021-09-17 RX ORDER — NAPROXEN 500 MG/1
500 TABLET ORAL 2 TIMES DAILY PRN
Qty: 60 TABLET | Refills: 3 | Status: SHIPPED | OUTPATIENT
Start: 2021-09-17 | End: 2023-03-13

## 2021-09-17 ASSESSMENT — ANXIETY QUESTIONNAIRES
6. BECOMING EASILY ANNOYED OR IRRITABLE: SEVERAL DAYS
3. WORRYING TOO MUCH ABOUT DIFFERENT THINGS: NOT AT ALL
4. TROUBLE RELAXING: NOT AT ALL
GAD7 TOTAL SCORE: 3
7. FEELING AFRAID AS IF SOMETHING AWFUL MIGHT HAPPEN: NOT AT ALL
2. NOT BEING ABLE TO STOP OR CONTROL WORRYING: SEVERAL DAYS
5. BEING SO RESTLESS THAT IT IS HARD TO SIT STILL: NOT AT ALL
1. FEELING NERVOUS, ANXIOUS, OR ON EDGE: SEVERAL DAYS

## 2021-09-17 ASSESSMENT — PATIENT HEALTH QUESTIONNAIRE - PHQ9: SUM OF ALL RESPONSES TO PHQ QUESTIONS 1-9: 5

## 2021-09-17 NOTE — PATIENT INSTRUCTIONS
Results for STEPHENIE REYNOSO (MRN [id number]) as of 9/17/2021 13:49   Ref. Range 9/17/2021 13:35   Hemoglobin A1C Latest Ref Range: 0.0 - 5.6 % 5.6

## 2021-09-17 NOTE — PROGRESS NOTES
Assessment & Plan     Type 2 diabetes mellitus without complication, without long-term current use of insulin (H)  Improvement of A1c from 7.3% to 5.6% with weight loss and addition of low-dose Metformin.  Discussed could trial off Metformin but patient would like to stay on this for weight loss and PCOS.  Change to extended release version to help with occasional GI side effects.  - Hemoglobin A1c  - metFORMIN (GLUCOPHAGE-XR) 500 MG 24 hr tablet  Dispense: 90 tablet; Refill: 1    Dysmenorrhea  PCOS (polycystic ovarian syndrome)  Irregular but painful menses with heavy flow.  Discussed options including OCPs but warned of possible weight gain, she could consider IUD, or Nexplanon but did discuss risk of irregular spotting.  At this time patient would like to consider her options and let us know.  In the meantime recommended naproxen for menstrual cramping and to help slightly reduced blood flow.  - naproxen (NAPROSYN) 500 MG tablet  Dispense: 60 tablet; Refill: 3    Need for prophylactic vaccination and inoculation against influenza  Influenza vaccine given today    Attention deficit hyperactivity disorder (ADHD), predominantly inattentive type  Stable on Concerta 72 mg daily.   reviewed and appropriate.  Will send in 3-month predated refills.    Anxiety  Stable on sertraline.    Class 3 severe obesity due to excess calories without serious comorbidity with body mass index (BMI) of 40.0 to 44.9 in adult (H)  Body mass index is 43.77 kg/m .  Congratulated patient on weight loss.    Vasovagal syncope  Patient has had a couple episodes of syncope while passing a BM over the past year.  She denies concurrent cardiopulmonary symptoms.  We discussed portance of keeping bowels soft and regular and ensuring she stays hydrated.  Also discussed importance of maintaining appropriate blood sugars and avoiding highs and lows which she understands.    Return in about 6 months (around 3/17/2022) for Follow up.    Rosa  DO CHARLENE Frank [facility]    Diego Kingsley is a 36 year old who presents for the following health issues  Chief Complaint   Patient presents with     Establish Care     Previously seeing Dr Lora      Diabetes     Not fasting      Menstrual Problem     PCOS, recently had menses 9/1/21, very painly cramping, heavy bleeding      Syncope     Has had 2 episodes in the last 6 months of syncope and near syncope while having a BM      Imm/Inj     Flu Shot     HPI   Diabetes:  20+ pound weight loss over the past 3 months.  She has been eating minimal carbs, rare diet soda, no juice, and has cut out sugar.  She has met with eRnetta the diabetic educator and has more resources of how to better manage her diet now.  She remains on metformin 500 mg once daily.  She occasionally has forgotten to eat with this and had an upset stomach and symptoms of hypoglycemia including shakiness and presyncope.    Syncope:  2 episodes of syncope in the past year while having a bowel movement.  She hit her head once over the past year.  She has done research on this and she knows not to strain during her bowel movements.  For the most part she feels her bowels are soft and regular.  The other presyncopal episode was when she took her Metformin without eating throughout the day and she has since learned from this.  She often has snacks in her pockets and work vague.    PCOS:  Previously not had a period in 1.5 years, but recently had a.  Early September 2021.  She feels this is due to weight loss and managing her blood sugar levels.  When she does get up.  She has a fairly heavy flow with severe cramping.  Ibuprofen has not been helpful.  She is wondering what else to take.  She has not been pregnant but may desire pregnancy in the future but understands it may be difficult.        Objective    /76 (BP Location: Left arm, Patient Position: Sitting, Cuff Size: Adult Large)   Pulse 74   Resp 16   Wt 119.3 kg  (263 lb)   BMI 43.77 kg/m    Body mass index is 43.77 kg/m .  Physical Exam   GENERAL: healthy, alert and no distress  EYES: Eyes grossly normal to inspection, PERRL and conjunctivae and sclerae normal  RESP: lungs clear to auscultation - no rales, rhonchi or wheezes  CV: regular rate and rhythm, normal S1 S2, no S3 or S4, no murmur, click or rub, no peripheral edema and peripheral pulses strong  ABDOMEN: obese  MS: no gross musculoskeletal defects noted, no edema  NEURO: Normal strength and tone, mentation intact and speech normal  PSYCH: mentation appears normal, affect normal/bright

## 2021-10-19 PROBLEM — F32.9 MAJOR DEPRESSION: Status: RESOLVED | Noted: 2021-02-11 | Resolved: 2021-09-17

## 2022-02-05 ENCOUNTER — HEALTH MAINTENANCE LETTER (OUTPATIENT)
Age: 37
End: 2022-02-05

## 2022-03-07 DIAGNOSIS — F90.0 ATTENTION DEFICIT HYPERACTIVITY DISORDER (ADHD), PREDOMINANTLY INATTENTIVE TYPE: ICD-10-CM

## 2022-03-07 NOTE — TELEPHONE ENCOUNTER
Medication Request  Medication name: methylphenidate (CONCERTA) 36 MG CR tablet  Requested Pharmacy: [place] [id number]  When was patient last seen for this?:  9/17/2021  Patient offered appointment: No  Okay to leave a detailed message: yes

## 2022-03-08 RX ORDER — METHYLPHENIDATE HYDROCHLORIDE 36 MG/1
72 TABLET ORAL DAILY
Qty: 60 TABLET | Refills: 0 | Status: SHIPPED | OUTPATIENT
Start: 2022-03-08 | End: 2022-05-04

## 2022-04-29 DIAGNOSIS — F90.0 ATTENTION DEFICIT HYPERACTIVITY DISORDER (ADHD), PREDOMINANTLY INATTENTIVE TYPE: ICD-10-CM

## 2022-04-29 RX ORDER — METHYLPHENIDATE HYDROCHLORIDE 36 MG/1
72 TABLET ORAL DAILY
Qty: 60 TABLET | Refills: 0 | OUTPATIENT
Start: 2022-04-29

## 2022-04-29 NOTE — TELEPHONE ENCOUNTER
Reason for Call:  Medication or medication refill:    Do you use a [facility]?  Name of the pharmacy and phone number for the current request: [facility]/PHARMACY #[id number] - [facility], [place] - [address] [place] [address]     Name of the medication requested: methylphenidate (CONCERTA) 36 MG CR tablet    Other request: N/A    Can we leave a detailed message on this number? YES    Phone number patient can be reached at: Cell number on file:    Telephone Information:   Mobile [phone]       Best Time: Any time    Call taken on 4/29/2022 at 10:52 AM by Vanna Amado

## 2022-04-29 NOTE — TELEPHONE ENCOUNTER
Note from 09/17/21:  Attention deficit hyperactivity disorder (ADHD), predominantly inattentive type  Stable on Concerta 72 mg daily.   reviewed and appropriate.  Will send in 3-month predated refills.

## 2022-05-02 ENCOUNTER — TELEPHONE (OUTPATIENT)
Dept: FAMILY MEDICINE | Facility: CLINIC | Age: 37
End: 2022-05-02

## 2022-05-02 NOTE — TELEPHONE ENCOUNTER
Medication: methylphenidate (Concerta)     Last Date Filled 3/8/2022     pulled: NO      Only PCP Prescribing?: NO    Taken as prescribed from physician notes?: YES  Attention deficit hyperactivity disorder (ADHD), predominantly inattentive type  Stable on Concerta 72 mg daily.   reviewed and appropriate.  Will send in 3-month predated refills.    CSA in last year: YES 1/7/2021    Random Utox in last year: NO    Opioids + benzodiazepines? NO

## 2022-05-02 NOTE — TELEPHONE ENCOUNTER
Reason for Call:  Other prescription    Detailed comments: PT NEEDS ADD MED REFILL, PT HAS APPT ON 5/23/22 BUT NEEDS REFILL BEFORE THEN    Phone Number Patient can be reached at: Cell number on file:    Telephone Information:   Mobile [phone]       Best Time: ANY    Can we leave a detailed message on this number? YES    Call taken on 5/2/2022 at 10:45 AM by Venita Arreguin

## 2022-05-02 NOTE — TELEPHONE ENCOUNTER
Please notify patient:    Unfortunately I am unable to refill your Concerta until your upcoming visit.  As reminder this is a controlled substance and requires a visit at least every 6 months.  We also need to update your controlled substance agreement and obtain a urine toxicology screen as a safety precaution.    Okay to use an open spot, if patient would like to get in sooner than her currently scheduled 5/23 appointment.    Rosa Frank, DO

## 2022-05-04 ENCOUNTER — OFFICE VISIT (OUTPATIENT)
Dept: FAMILY MEDICINE | Facility: CLINIC | Age: 37
End: 2022-05-04
Payer: COMMERCIAL

## 2022-05-04 VITALS
HEIGHT: 65 IN | HEART RATE: 98 BPM | BODY MASS INDEX: 45.15 KG/M2 | DIASTOLIC BLOOD PRESSURE: 72 MMHG | WEIGHT: 271 LBS | SYSTOLIC BLOOD PRESSURE: 122 MMHG

## 2022-05-04 DIAGNOSIS — E66.01 MORBID OBESITY (H): ICD-10-CM

## 2022-05-04 DIAGNOSIS — K21.9 GASTROESOPHAGEAL REFLUX DISEASE WITHOUT ESOPHAGITIS: ICD-10-CM

## 2022-05-04 DIAGNOSIS — Z87.898 HISTORY OF PREDIABETES: ICD-10-CM

## 2022-05-04 DIAGNOSIS — F90.0 ATTENTION DEFICIT HYPERACTIVITY DISORDER (ADHD), PREDOMINANTLY INATTENTIVE TYPE: Primary | ICD-10-CM

## 2022-05-04 PROBLEM — K60.30 ANAL FISTULA: Status: ACTIVE | Noted: 2018-12-10

## 2022-05-04 PROBLEM — E11.9 DIABETES MELLITUS, TYPE 2 (H): Status: RESOLVED | Noted: 2021-05-19 | Resolved: 2022-05-04

## 2022-05-04 PROBLEM — E66.813 CLASS 3 SEVERE OBESITY DUE TO EXCESS CALORIES WITHOUT SERIOUS COMORBIDITY WITH BODY MASS INDEX (BMI) OF 40.0 TO 44.9 IN ADULT (H): Status: RESOLVED | Noted: 2021-01-07 | Resolved: 2022-05-04

## 2022-05-04 LAB
ANION GAP SERPL CALCULATED.3IONS-SCNC: 11 MMOL/L (ref 5–18)
BUN SERPL-MCNC: 18 MG/DL (ref 8–22)
CALCIUM SERPL-MCNC: 9.5 MG/DL (ref 8.5–10.5)
CHLORIDE BLD-SCNC: 105 MMOL/L (ref 98–107)
CO2 SERPL-SCNC: 26 MMOL/L (ref 22–31)
CREAT SERPL-MCNC: 1.05 MG/DL (ref 0.6–1.1)
GFR SERPL CREATININE-BSD FRML MDRD: 70 ML/MIN/1.73M2
GLUCOSE BLD-MCNC: 159 MG/DL (ref 70–125)
HBA1C MFR BLD: 6.2 % (ref 0–5.6)
POTASSIUM BLD-SCNC: 4.5 MMOL/L (ref 3.5–5)
SODIUM SERPL-SCNC: 142 MMOL/L (ref 136–145)

## 2022-05-04 PROCEDURE — 99213 OFFICE O/P EST LOW 20 MIN: CPT | Mod: 25 | Performed by: FAMILY MEDICINE

## 2022-05-04 PROCEDURE — 83036 HEMOGLOBIN GLYCOSYLATED A1C: CPT | Performed by: FAMILY MEDICINE

## 2022-05-04 PROCEDURE — 80048 BASIC METABOLIC PNL TOTAL CA: CPT | Performed by: FAMILY MEDICINE

## 2022-05-04 PROCEDURE — 91305 COVID-19,PF,PFIZER (12+ YRS): CPT | Performed by: FAMILY MEDICINE

## 2022-05-04 PROCEDURE — 36415 COLL VENOUS BLD VENIPUNCTURE: CPT | Performed by: FAMILY MEDICINE

## 2022-05-04 PROCEDURE — 0054A COVID-19,PF,PFIZER (12+ YRS): CPT | Performed by: FAMILY MEDICINE

## 2022-05-04 RX ORDER — FAMOTIDINE 20 MG/1
20 TABLET, FILM COATED ORAL 2 TIMES DAILY PRN
Qty: 180 TABLET | Refills: 1 | Status: SHIPPED | OUTPATIENT
Start: 2022-05-04 | End: 2023-03-13

## 2022-05-04 RX ORDER — METHYLPHENIDATE HYDROCHLORIDE 36 MG/1
72 TABLET ORAL EVERY MORNING
Qty: 60 TABLET | Refills: 0 | Status: SHIPPED | OUTPATIENT
Start: 2022-05-04 | End: 2022-11-03

## 2022-05-04 ASSESSMENT — PATIENT HEALTH QUESTIONNAIRE - PHQ9: SUM OF ALL RESPONSES TO PHQ QUESTIONS 1-9: 8

## 2022-05-04 NOTE — LETTER
[facility]  05/04/22  Patient: Teetee Alvarado  YOB: 1985  Medical Record Number: [id number]                                                                                  Non-Opioid Controlled Substance Agreement    This is an agreement between you and your provider regarding safe and appropriate use of controlled substances prescribed by your care team. Controlled substances are?medicines that can cause physical and mental dependence (abuse).     There are strict laws about having and using these medicines. We here at [facility] are  committed to working with you in your efforts to get better. To support you in this work, we'll help you schedule regular office appointments for medicine refills. If we must cancel or change your appointment for any reason, we'll make sure you have enough medicine to last until your next appointment.     As a Provider, I will:     Listen carefully to your concerns while treating you with respect.     Recommend a treatment plan that I believe is in your best interest and may involve therapies other than medicine.      Talk with you often about the possible benefits and the risk of harm of any medicine that we prescribe for you.    Assess the safety of this medicine and check how well it works.      Provide a plan on how to taper (discontinue or go off) using this medicine if the decision is made to stop its use.      ::  As a Patient, I understand controlled substances:       Are prescribed by my care provider to help me function or work and manage my condition(s).?    Are strong medicines and can cause serious side effects.       Need to be taken exactly as prescribed.?Combining controlled substances with certain medicines or chemicals (such as illegal drugs, alcohol, sedatives, sleeping pills, and benzodiazepines) can be dangerous or even fatal.? If I stop taking my medicines suddenly, I may have severe withdrawal symptoms.     The  risks, benefits, and side effects of these medicine(s) were explained to me. I agree that:    1. I will take part in other treatments as advised by my care team. This may be psychiatry or counseling, physical therapy, behavioral therapy, group treatment or a referral to specialist.    2. I will keep all my appointments and understand this is part of the monitoring of controlled substances.?My care team may require an office visit for EVERY controlled substance refill. If I miss appointments or don t follow instructions, my care team may stop my medicine    3. I will take my medicines as prescribed. I will not change the dose or schedule unless my care team tells me to. There will be no refills if I run out early.      4. I may be asked to come to the clinic and complete a urine drug test or complete a pill count. If I don t give a urine sample or participate in a pill count, the care team may stop my medicine.    5. I will only receive controlled substance prescriptions from this clinic. If I am treated by another provider, I will tell them that I am taking controlled substances and that I have a treatment agreement with this provider. I will inform my [facility] care team within one business day if I am given a prescription for any controlled substance by another healthcare provider. My [facility] care team can contact other providers and pharmacists about my use of any medicines.    6. It is up to me to make sure that I don't run out of my medicines on weekends or holidays.?If my care team is willing to refill my prescription without a visit, I must request refills only during office hours. Refills may take up to 3 business days to process. I will use one pharmacy to fill all my controlled substance prescriptions. I will notify the clinic about any changes to my insurance or medicine availability.    7. I am responsible for my prescriptions. If the medicine/prescription is lost, stolen or destroyed,  it will not be replaced.?I also agree not to share controlled substance medicines with anyone.     8. I am aware I should not use any illegal or recreational drugs. I agree not to drink alcohol unless my care team says I can.     9. If I enroll in the [place] Medical Cannabis program, I will tell my care team before my next refill.    10. I will tell my care team right away if I become pregnant, have a new medical problem treated outside of my regular clinic, or have a change in my medicines.     11. I understand that this medicine can affect my thinking, judgment and reaction time.? Alcohol and drugs affect the brain and body, which can affect the safety of my driving. Being under the influence of alcohol or drugs can affect my decision-making, behaviors, personal safety and the safety of others. Driving while impaired (DWI) can occur if a person is driving, operating or in physical control of a car, motorcycle, boat, snowmobile, ATV, motorbike, off-road vehicle or any other motor vehicle ([place] Statute 169A.20). I understand the risk if I choose to drive or operate any vehicle or machinery.    I understand that if I do not follow any of the conditions above, my prescriptions or treatment may be stopped or changed.   I agree that my provider, clinic care team and pharmacy may work with any city, state or federal law enforcement agency that investigates the misuse, sale or other diversion of my controlled medicine. I will allow my provider to discuss my care with, or share a copy of, this agreement with any other treating provider, pharmacy or emergency room where I receive care.     I have read this agreement and have asked questions about anything I did not understand.    ________________________________________________________  Patient Signature - Teetee Alvarado     ___________________                   Date     ________________________________________________________  Provider Signature - Rosa Frank,         ___________________                   Date     ________________________________________________________  Witness Signature (required if provider not present while patient signing)          ___________________                   Date

## 2022-05-04 NOTE — LETTER
My Depression Action Plan  Name: Teetee Alvarado   Date of Birth 1985  Date: 5/4/2022    My doctor: Rosa Frank   My clinic: [address] [facility] [id number] [phone]          GREEN    ZONE   Good Control    What it looks like:     Things are going generally well. You have normal ups and downs. You may even feel depressed from time to time, but bad moods usually last less than a day.   What you need to do:  1. Continue to care for yourself (see self care plan)  2. Check your depression survival kit and update it as needed  3. Follow your physician s recommendations including any medication.  4. Do not stop taking medication unless you consult with your physician first.           YELLOW         ZONE Getting Worse    What it looks like:     Depression is starting to interfere with your life.     It may be hard to get out of bed; you may be starting to isolate yourself from others.    Symptoms of depression are starting to last most all day and this has happened for several days.     You may have suicidal thoughts but they are not constant.   What you need to do:     1. Call your care team. Your response to treatment will improve if you keep your care team informed of your progress. Yellow periods are signs an adjustment may need to be made.     2. Continue your self-care.  Just get dressed and ready for the day.  Don't give yourself time to talk yourself out of it.    3. Talk to someone in your support network.    4. Open up your Depression Self-Care Plan/Wellness Kit.           RED    ZONE Medical Alert - Get Help    What it looks like:     Depression is seriously interfering with your life.     You may experience these or other symptoms: You can t get out of bed most days, can t work or engage in other necessary activities, you have trouble taking care of basic hygiene, or basic responsibilities, thoughts of suicide or death that will not  go away, self-injurious behavior.     What you need to do:  1. Call your care team and request a same-day appointment. If they are not available (weekends or after hours) call your local crisis line, emergency room or 911.          Depression Self-Care Plan / Wellness Kit    Many people find that medication and therapy are helpful treatments for managing depression. In addition, making small changes to your everyday life can help to boost your mood and improve your wellbeing. Below are some tips for you to consider. Be sure to talk with your medical provider and/or behavioral health consultant if your symptoms are worsening or not improving.     Sleep   Sleep hygiene  means all of the habits that support good, restful sleep. It includes maintaining a consistent bedtime and wake time, using your bedroom only for sleeping or sex, and keeping the bedroom dark and free of distractions like a computer, smartphone, or television.     Develop a Healthy Routine  Maintain good hygiene. Get out of bed in the morning, make your bed, brush your teeth, take a shower, and get dressed. Don t spend too much time viewing media that makes you feel stressed. Find time to relax each day.    Exercise  Get some form of exercise every day. This will help reduce pain and release endorphins, the  feel good  chemicals in your brain. It can be as simple as just going for a walk or doing some gardening, anything that will get you moving.      Diet  Strive to eat healthy foods, including fruits and vegetables. Drink plenty of water. Avoid excessive sugar, caffeine, alcohol, and other mood-altering substances.     Stay Connected with Others  Stay in touch with friends and family members.    Manage Your Mood  Try deep breathing, massage therapy, biofeedback, or meditation. Take part in fun activities when you can. Try to find something to smile about each day.     Psychotherapy  Be open to working with a therapist if your provider recommends it.      Medication  Be sure to take your medication as prescribed. Most anti-depressants need to be taken every day. It usually takes several weeks for medications to work. Not all medicines work for all people. It is important to follow-up with your provider to make sure you have a treatment plan that is working for you. Do not stop your medication abruptly without first discussing it with your provider.    Crisis Resources   These hotlines are for both adults and children. They and are open 24 hours a day, 7 days a week unless noted otherwise.      National Suicide Prevention Lifeline   0-793-952-TALK (8219)      Crisis Text Line    www.crisistextline.org  Text HOME to 487454 from anywhere in the [place], anytime, about any type of crisis. A live, trained crisis counselor will receive the text and respond quickly.      [facility] Lifeline for LGBTQ Youth  A national crisis intervention and suicide lifeline for LGBTQ youth under 25. Provides a safe place to talk without judgement. Call 1-371.298.1132; text START to 303658 or visit www.thetrevorproject.org to talk to a trained counselor.      For [facility] crisis numbers, visit the [facility] website at:  https://[place].gov/dhs/people-we-serve/adults/health-care/mental-health/resources/crisis-contacts.jsp

## 2022-05-04 NOTE — LETTER
My Depression Action Plan  Name: Teetee Alvarado   Date of Birth 1985  Date: 5/4/2022    My doctor: Rosa Frank   My clinic: [address] [facility] [id number] [phone]          GREEN    ZONE   Good Control    What it looks like:     Things are going generally well. You have normal ups and downs. You may even feel depressed from time to time, but bad moods usually last less than a day.   What you need to do:  1. Continue to care for yourself (see self care plan)  2. Check your depression survival kit and update it as needed  3. Follow your physician s recommendations including any medication.  4. Do not stop taking medication unless you consult with your physician first.           YELLOW         ZONE Getting Worse    What it looks like:     Depression is starting to interfere with your life.     It may be hard to get out of bed; you may be starting to isolate yourself from others.    Symptoms of depression are starting to last most all day and this has happened for several days.     You may have suicidal thoughts but they are not constant.   What you need to do:     1. Call your care team. Your response to treatment will improve if you keep your care team informed of your progress. Yellow periods are signs an adjustment may need to be made.     2. Continue your self-care.  Just get dressed and ready for the day.  Don't give yourself time to talk yourself out of it.    3. Talk to someone in your support network.    4. Open up your Depression Self-Care Plan/Wellness Kit.           RED    ZONE Medical Alert - Get Help    What it looks like:     Depression is seriously interfering with your life.     You may experience these or other symptoms: You can t get out of bed most days, can t work or engage in other necessary activities, you have trouble taking care of basic hygiene, or basic responsibilities, thoughts of suicide or death that will not  go away, self-injurious behavior.     What you need to do:  1. Call your care team and request a same-day appointment. If they are not available (weekends or after hours) call your local crisis line, emergency room or 911.          Depression Self-Care Plan / Wellness Kit    Many people find that medication and therapy are helpful treatments for managing depression. In addition, making small changes to your everyday life can help to boost your mood and improve your wellbeing. Below are some tips for you to consider. Be sure to talk with your medical provider and/or behavioral health consultant if your symptoms are worsening or not improving.     Sleep   Sleep hygiene  means all of the habits that support good, restful sleep. It includes maintaining a consistent bedtime and wake time, using your bedroom only for sleeping or sex, and keeping the bedroom dark and free of distractions like a computer, smartphone, or television.     Develop a Healthy Routine  Maintain good hygiene. Get out of bed in the morning, make your bed, brush your teeth, take a shower, and get dressed. Don t spend too much time viewing media that makes you feel stressed. Find time to relax each day.    Exercise  Get some form of exercise every day. This will help reduce pain and release endorphins, the  feel good  chemicals in your brain. It can be as simple as just going for a walk or doing some gardening, anything that will get you moving.      Diet  Strive to eat healthy foods, including fruits and vegetables. Drink plenty of water. Avoid excessive sugar, caffeine, alcohol, and other mood-altering substances.     Stay Connected with Others  Stay in touch with friends and family members.    Manage Your Mood  Try deep breathing, massage therapy, biofeedback, or meditation. Take part in fun activities when you can. Try to find something to smile about each day.     Psychotherapy  Be open to working with a therapist if your provider recommends it.      Medication  Be sure to take your medication as prescribed. Most anti-depressants need to be taken every day. It usually takes several weeks for medications to work. Not all medicines work for all people. It is important to follow-up with your provider to make sure you have a treatment plan that is working for you. Do not stop your medication abruptly without first discussing it with your provider.    Crisis Resources   These hotlines are for both adults and children. They and are open 24 hours a day, 7 days a week unless noted otherwise.      National Suicide Prevention Lifeline   9-706-871-TALK (7966)      Crisis Text Line    www.crisistextline.org  Text HOME to 394418 from anywhere in the [place], anytime, about any type of crisis. A live, trained crisis counselor will receive the text and respond quickly.      [facility] Lifeline for LGBTQ Youth  A national crisis intervention and suicide lifeline for LGBTQ youth under 25. Provides a safe place to talk without judgement. Call 1-696.979.9456; text START to 007913 or visit www.thetrevorproject.org to talk to a trained counselor.      For [facility] crisis numbers, visit the [facility] website at:  [place]://[place].gov/dhs/people-we-serve/adults/health-care/mental-health/resources/crisis-contacts.jsp

## 2022-05-04 NOTE — PROGRESS NOTES
Assessment & Plan     Attention deficit hyperactivity disorder (ADHD), predominantly inattentive type  Symptoms stable on daily Concerta.  She notes some wear off affect in the afternoons but she is currently on max dose of this medication.  She will consider taking 1 dose in the early morning and 1 dose late morning to prolong its effectiveness.  Controlled substance agreement signed today and will update UDS.   reviewed and appropriate.  - methylphenidate (CONCERTA) 36 MG CR tablet  Dispense: 60 tablet; Refill: 0  - [id number] - Urine Drug Confirmation Panel (Comprehensive)    History of prediabetes  Hemoglobin A1c in diabetic range at 7.3% May 2021.  Patient was able to improve this drastically to 5.6% in September 2021 with diet modifications and low-dose metformin.  She admits to rebound weight gain so we will recheck her A1c today to see if her metformin dose should be adjusted.  She declined meeting with weight loss specialist at this time.  - HEMOGLOBIN A1C  - BASIC METABOLIC PANEL    Gastroesophageal reflux disease without esophagitis  Infrequent Pepcid use for reflux symptoms but she has made diet modifications which have been helpful.  Famotidine refilled for as needed use.  - famotidine (PEPCID) 20 MG tablet  Dispense: 180 tablet; Refill: 1    Morbid obesity (H)  Body mass index is 45.1 kg/m .     Rosa Frank [facility]    Diego Kingsley is a 37 year old who presents for the following health issues   Chief Complaint   Patient presents with     Recheck Medication     Not fasting       History of Present Illness       Diabetes:   She presents for follow up of diabetes.  She is checking home blood glucose a few times a month. She checks blood glucose before meals.  Blood glucose is never over 200 and never under 70. She is aware of hypoglycemia symptoms including shakiness and blurred vision. She has no concerns regarding her diabetes at this time.  She is having  "blurry vision and weight gain. The patient has had a diabetic eye exam in the last 12 months.         Reason for visit:  Med check, diabetes check, Covid booster  Symptoms include:  Diabetic diagnosed 5/2021  Symptom intensity:  Mild  Symptom progression:  Improving  Had these symptoms before:  Yes  Has tried/received treatment for these symptoms:  Yes  Previous treatment was successful:  Yes  Prior treatment description:  Metformin 500mg    She eats 2-3 servings of fruits and vegetables daily.She consumes 0 sweetened beverage(s) daily.She exercises with enough effort to increase her heart rate 9 or less minutes per day.  She exercises with enough effort to increase her heart rate 3 or less days per week. She is missing 1 dose(s) of medications per week.  Lost about 40 pounds with dietary changes and has since gained most of it back with 615 pounds.  She admits this is due to her food choices particularly over the winter months.  She has been taking metformin 500 mg daily with breakfast.    She has been without her Concerta for few days now and has been struggling with inattention.  She works at [employer] and has varying hours.  If she takes her Concerta past 11 AM she has difficulty sleeping at night.  It has not affected her appetite.  Concerta is referred over the other ADHD medication because it does not make her jittery.  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        Objective    /72   Pulse 98   Ht 1.651 m (5' 5\")   Wt 122.9 kg (271 lb)   BMI 45.10 kg/m    Body mass index is 45.1 kg/m .  Physical Exam   GENERAL: healthy, alert and no distress  RESP: lungs clear to auscultation - no rales, rhonchi or wheezes  CV: regular rate and rhythm, normal S1 S2, no S3 or S4, no murmu  PSYCH: mentation appears normal, affect normal/bright, judgement and insight intact and appearance well groomed        "

## 2022-05-29 DIAGNOSIS — F41.9 ANXIETY: ICD-10-CM

## 2022-05-30 RX ORDER — SERTRALINE HYDROCHLORIDE 100 MG/1
TABLET, FILM COATED ORAL
Qty: 90 TABLET | Refills: 3 | Status: SHIPPED | OUTPATIENT
Start: 2022-05-30 | End: 2022-11-03

## 2022-05-31 NOTE — TELEPHONE ENCOUNTER
"Last Written Prescription Date:  9/1/21  Last Fill Quantity: 30,  # refills: 0   Last office visit provider:  5/4/22     Requested Prescriptions   Pending Prescriptions Disp Refills     sertraline (ZOLOFT) 100 MG tablet [Pharmacy Med Name: SERTRALINE  MG TABLET] 90 tablet 3     Sig: TAKE 1 TABLET BY MOUTH EVERY DAY       SSRIs Protocol Passed - 5/29/2022  7:00 AM        Passed - Recent (12 mo) or future (30 days) visit within the authorizing provider's specialty     Patient has had an office visit with the authorizing provider or a provider within the authorizing providers department within the previous 12 mos or has a future within next 30 days. See \"Patient Info\" tab in inbasket, or \"Choose Columns\" in Meds & Orders section of the refill encounter.              Passed - Medication is active on med list        Passed - Patient is age 18 or older        Passed - No active pregnancy on record        Passed - No positive pregnancy test in last 12 months             Winifred Koroma RN 05/30/22 9:40 PM  "

## 2022-07-23 ENCOUNTER — HEALTH MAINTENANCE LETTER (OUTPATIENT)
Age: 37
End: 2022-07-23

## 2022-09-30 DIAGNOSIS — F41.9 ANXIETY: ICD-10-CM

## 2022-09-30 DIAGNOSIS — E11.9 TYPE 2 DIABETES MELLITUS WITHOUT COMPLICATION, WITHOUT LONG-TERM CURRENT USE OF INSULIN (H): ICD-10-CM

## 2022-10-01 ENCOUNTER — HEALTH MAINTENANCE LETTER (OUTPATIENT)
Age: 37
End: 2022-10-01

## 2022-10-03 RX ORDER — ALPRAZOLAM 0.5 MG
TABLET ORAL
Qty: 20 TABLET | OUTPATIENT
Start: 2022-10-03

## 2022-10-03 RX ORDER — ASPIRIN 81 MG/1
TABLET, COATED ORAL
Qty: 90 TABLET | Refills: 3 | Status: SHIPPED | OUTPATIENT
Start: 2022-10-03 | End: 2023-03-13

## 2022-10-03 RX ORDER — METFORMIN HCL 500 MG
TABLET, EXTENDED RELEASE 24 HR ORAL
Qty: 90 TABLET | Refills: 3 | Status: SHIPPED | OUTPATIENT
Start: 2022-10-03 | End: 2022-11-03

## 2022-10-04 NOTE — TELEPHONE ENCOUNTER
Informed patient of message below. Patient states understanding. She has an appt scheduled on 11/03 with a different provider to discuss medications.

## 2022-11-03 ENCOUNTER — OFFICE VISIT (OUTPATIENT)
Dept: FAMILY MEDICINE | Facility: CLINIC | Age: 37
End: 2022-11-03
Payer: COMMERCIAL

## 2022-11-03 VITALS
DIASTOLIC BLOOD PRESSURE: 80 MMHG | BODY MASS INDEX: 44.65 KG/M2 | RESPIRATION RATE: 20 BRPM | HEART RATE: 89 BPM | WEIGHT: 277.8 LBS | SYSTOLIC BLOOD PRESSURE: 138 MMHG | HEIGHT: 66 IN

## 2022-11-03 DIAGNOSIS — E11.10 TYPE 2 DIABETES MELLITUS WITH KETOACIDOSIS WITHOUT COMA, WITH LONG-TERM CURRENT USE OF INSULIN (H): ICD-10-CM

## 2022-11-03 DIAGNOSIS — F41.9 ANXIETY: ICD-10-CM

## 2022-11-03 DIAGNOSIS — E11.9 TYPE 2 DIABETES MELLITUS WITHOUT COMPLICATION, WITHOUT LONG-TERM CURRENT USE OF INSULIN (H): ICD-10-CM

## 2022-11-03 DIAGNOSIS — Z79.4 TYPE 2 DIABETES MELLITUS WITH KETOACIDOSIS WITHOUT COMA, WITH LONG-TERM CURRENT USE OF INSULIN (H): ICD-10-CM

## 2022-11-03 DIAGNOSIS — Z23 HIGH PRIORITY FOR 2019-NCOV VACCINE: ICD-10-CM

## 2022-11-03 DIAGNOSIS — E28.2 PCOS (POLYCYSTIC OVARIAN SYNDROME): ICD-10-CM

## 2022-11-03 DIAGNOSIS — F90.0 ATTENTION DEFICIT HYPERACTIVITY DISORDER (ADHD), PREDOMINANTLY INATTENTIVE TYPE: Primary | ICD-10-CM

## 2022-11-03 DIAGNOSIS — Z13.220 SCREENING FOR HYPERLIPIDEMIA: ICD-10-CM

## 2022-11-03 LAB
CHOLEST SERPL-MCNC: 155 MG/DL
CREAT UR-MCNC: 90.3 MG/DL
HBA1C MFR BLD: 6.3 % (ref 0–5.6)
HDLC SERPL-MCNC: 47 MG/DL
LDLC SERPL CALC-MCNC: 88 MG/DL
MICROALBUMIN UR-MCNC: <12 MG/L
MICROALBUMIN/CREAT UR: NORMAL MG/G{CREAT}
NONHDLC SERPL-MCNC: 108 MG/DL
TRIGL SERPL-MCNC: 99 MG/DL

## 2022-11-03 PROCEDURE — 36415 COLL VENOUS BLD VENIPUNCTURE: CPT | Performed by: FAMILY MEDICINE

## 2022-11-03 PROCEDURE — 80061 LIPID PANEL: CPT | Performed by: FAMILY MEDICINE

## 2022-11-03 PROCEDURE — 0124A COVID-19,PF,PFIZER BOOSTER BIVALENT: CPT | Performed by: FAMILY MEDICINE

## 2022-11-03 PROCEDURE — 83036 HEMOGLOBIN GLYCOSYLATED A1C: CPT | Performed by: FAMILY MEDICINE

## 2022-11-03 PROCEDURE — 91312 COVID-19,PF,PFIZER BOOSTER BIVALENT: CPT | Performed by: FAMILY MEDICINE

## 2022-11-03 PROCEDURE — 82043 UR ALBUMIN QUANTITATIVE: CPT | Performed by: FAMILY MEDICINE

## 2022-11-03 PROCEDURE — 99214 OFFICE O/P EST MOD 30 MIN: CPT | Mod: 25 | Performed by: FAMILY MEDICINE

## 2022-11-03 RX ORDER — METHYLPHENIDATE HYDROCHLORIDE 36 MG/1
72 TABLET ORAL EVERY MORNING
Qty: 60 TABLET | Refills: 0 | Status: SHIPPED | OUTPATIENT
Start: 2022-11-03 | End: 2023-03-13

## 2022-11-03 RX ORDER — SERTRALINE HYDROCHLORIDE 100 MG/1
100 TABLET, FILM COATED ORAL DAILY
Qty: 90 TABLET | Refills: 0 | Status: SHIPPED | OUTPATIENT
Start: 2022-11-03 | End: 2023-03-13

## 2022-11-03 RX ORDER — METFORMIN HCL 500 MG
500 TABLET, EXTENDED RELEASE 24 HR ORAL
Qty: 90 TABLET | Refills: 0 | Status: SHIPPED | OUTPATIENT
Start: 2022-11-03 | End: 2023-03-13

## 2022-11-03 ASSESSMENT — ANXIETY QUESTIONNAIRES
GAD7 TOTAL SCORE: 17
GAD7 TOTAL SCORE: 17
8. IF YOU CHECKED OFF ANY PROBLEMS, HOW DIFFICULT HAVE THESE MADE IT FOR YOU TO DO YOUR WORK, TAKE CARE OF THINGS AT HOME, OR GET ALONG WITH OTHER PEOPLE?: VERY DIFFICULT
3. WORRYING TOO MUCH ABOUT DIFFERENT THINGS: NEARLY EVERY DAY
7. FEELING AFRAID AS IF SOMETHING AWFUL MIGHT HAPPEN: SEVERAL DAYS
2. NOT BEING ABLE TO STOP OR CONTROL WORRYING: MORE THAN HALF THE DAYS
GAD7 TOTAL SCORE: 17
7. FEELING AFRAID AS IF SOMETHING AWFUL MIGHT HAPPEN: SEVERAL DAYS
4. TROUBLE RELAXING: NEARLY EVERY DAY
5. BEING SO RESTLESS THAT IT IS HARD TO SIT STILL: NEARLY EVERY DAY
IF YOU CHECKED OFF ANY PROBLEMS ON THIS QUESTIONNAIRE, HOW DIFFICULT HAVE THESE PROBLEMS MADE IT FOR YOU TO DO YOUR WORK, TAKE CARE OF THINGS AT HOME, OR GET ALONG WITH OTHER PEOPLE: VERY DIFFICULT
1. FEELING NERVOUS, ANXIOUS, OR ON EDGE: NEARLY EVERY DAY
6. BECOMING EASILY ANNOYED OR IRRITABLE: MORE THAN HALF THE DAYS

## 2022-11-03 ASSESSMENT — PATIENT HEALTH QUESTIONNAIRE - PHQ9
SUM OF ALL RESPONSES TO PHQ QUESTIONS 1-9: 12
10. IF YOU CHECKED OFF ANY PROBLEMS, HOW DIFFICULT HAVE THESE PROBLEMS MADE IT FOR YOU TO DO YOUR WORK, TAKE CARE OF THINGS AT HOME, OR GET ALONG WITH OTHER PEOPLE: SOMEWHAT DIFFICULT
SUM OF ALL RESPONSES TO PHQ QUESTIONS 1-9: 12

## 2022-11-03 NOTE — Clinical Note
AILYN - this pt saw me for med check bc you were going to make her do a urine.  She smokes pot everyday.  I told her I'm not comfortable with this but agreed to fill meds until she can establish with psychiatrist.  I also told her I'm not taking her on as a new pt.

## 2022-11-03 NOTE — PROGRESS NOTES
1. Attention deficit hyperactivity disorder (ADHD), predominantly inattentive type  Teetee presents to me as a new patient for medication check.  Her previous provider would not refill her methylphenidate until she came in for a urine sample.  Patient admits to using marijuana nightly.  I discussed with her that I am not comfortable prescribing this long-term with accommodation of a stimulant and marijuana.  I did however agree to give her a short-term prescription until she can get in with another provider who might feel more comfortable with this.  I gave her referral to psychiatry.  - Adult Mental Health  Referral; Future  - methylphenidate (CONCERTA) 36 MG CR tablet; Take 2 tablets (72 mg) by mouth every morning  Dispense: 60 tablet; Refill: 0    2. Type 2 diabetes mellitus without complication, without long-term current use of insulin (H)  She has been off all her medications since May.  We will refill her metformin and check an A1c today.  As she will have to establish with another provider as I am not taking new patients currently.  - Lipid panel reflex to direct LDL Non-fasting; Future  - Albumin Random Urine Quantitative with Creat Ratio; Future  - metFORMIN (GLUCOPHAGE XR) 500 MG 24 hr tablet; Take 1 tablet (500 mg) by mouth daily (with breakfast)  Dispense: 90 tablet; Refill: 0    3. Screening for hyperlipidemia  She needs biometric forms filled out today for her employer  - Lipid panel reflex to direct LDL Non-fasting; Future    4. PCOS (polycystic ovarian syndrome)  - HEMOGLOBIN A1C; Future    5. High priority for 2019-nCoV vaccine    6. Anxiety  Will refer her to psychiatry.  Refills on the sertraline for anxiety.  - Adult Mental Health  Referral; Future  - sertraline (ZOLOFT) 100 MG tablet; Take 1 tablet (100 mg) by mouth daily  Dispense: 90 tablet; Refill: 0      Subjective   Teetee is a 37 year old presenting for the following health issues:  Med check (ADHD - mental health -  DM)  Patient presents today as a new patient to me today.  She was originally seeing Dr. Lora and then has been seeing Dr. Frank recently.  She has a controlled substance agreement with Dr. Frank for her ADHD medication.  However, patient has refused to do a urine sample due to the fact that she uses marijuana regularly.  Dr. Frank was not comfortable refilling this medication until she did a urine sample.  Patient's states that she smokes a bowl pot every night to help her relax.  She states that she is not functioning very well without the stimulant medication.  I discussed with her that many of us are not comfortable prescribing controlled substances been people are using illegal substances.  She could certainly seek out another provider such as a psychiatrist and see if they feel more comfortable with this.  I agreed to give her a short-term prescription until she could establish with another provider.  She states that because she did not have her ADHD medication, she could not focus enough to refill her other medications.  Therefore she has been off her metformin and sertraline.  She needs refills of these medications as well.  She has biometric forms that she needs done for her employer.  I did discuss with her that I am not taking on new patient so she will have to either return to Dr. Frank or establish with another provider.    History of Present Illness       Mental Health Follow-up:  Patient presents to follow-up on Depression & Anxiety.Patient's depression since last visit has been:  Worse  The patient is not having other symptoms associated with depression.  Patient's anxiety since last visit has been:  Bad  The patient is having other symptoms associated with anxiety.  Any significant life events: relationship concerns and health concerns  Patient is feeling anxious or having panic attacks.  Patient has no concerns about alcohol or drug use.    Diabetes:   She presents for follow up of  "diabetes.  She is not checking blood glucose. She is concerned about other. She is having blurry vision. The patient has not had a diabetic eye exam in the last 12 months.         Reason for visit:  Adhd    She eats 2-3 servings of fruits and vegetables daily.She consumes 0 sweetened beverage(s) daily.She exercises with enough effort to increase her heart rate 9 or less minutes per day.  She exercises with enough effort to increase her heart rate 3 or less days per week. She is missing 7 dose(s) of medications per week.    Today's PHQ-9         PHQ-9 Total Score: 12    PHQ-9 Q9 Thoughts of better off dead/self-harm past 2 weeks :   Not at all    How difficult have these problems made it for you to do your work, take care of things at home, or get along with other people: Somewhat difficult  Today's JAE-7 Score: 17               Objective    /80   Pulse 89   Resp 20   Ht 1.664 m (5' 5.5\")   Wt 126 kg (277 lb 12.8 oz)   LMP  (LMP Unknown)   BMI 45.53 kg/m    Body mass index is 45.53 kg/m .  Physical Exam   GENERAL: Tearful  RESP: lungs clear to auscultation - no rales, rhonchi or wheezes  CV: regular rate and rhythm, normal S1 S2, no S3 or S4, no murmur, click or rub, no peripheral edema and peripheral pulses strong            "

## 2022-11-10 DIAGNOSIS — Z79.4 TYPE 2 DIABETES MELLITUS WITH KETOACIDOSIS WITHOUT COMA, WITH LONG-TERM CURRENT USE OF INSULIN (H): ICD-10-CM

## 2022-11-10 DIAGNOSIS — E11.10 TYPE 2 DIABETES MELLITUS WITH KETOACIDOSIS WITHOUT COMA, WITH LONG-TERM CURRENT USE OF INSULIN (H): ICD-10-CM

## 2022-11-10 NOTE — TELEPHONE ENCOUNTER
Last Written Prescription Date:  2021  Last Fill Quantity: 100,  # refills: 6   Last office visit provider:  11/3/2022     Requested Prescriptions   Pending Prescriptions Disp Refills     UNABLE TO FIND 100 each 6     Si each 2 times daily       There is no refill protocol information for this order          Felicia Hewitt RN 11/10/22 2:39 PM

## 2022-11-10 NOTE — TELEPHONE ENCOUNTER
"Medication Request  Medication name: generic lancets (MICROLET LANCET)  Requested Pharmacy: [facility] # [id number]  When was patient last seen?:  11/03/22  Patient offered appointment: No  Okay to leave a detailed message: yes      Patient is completely out of LANCETS and only her test strips were sent over during last fill request. She is requesting these be sent over asap.  Unable to prep refill as is states \"UNABLE TO FIND\" when attempting.    "

## 2023-02-04 ENCOUNTER — HEALTH MAINTENANCE LETTER (OUTPATIENT)
Age: 38
End: 2023-02-04

## 2023-02-17 ENCOUNTER — NURSE TRIAGE (OUTPATIENT)
Dept: NURSING | Facility: CLINIC | Age: 38
End: 2023-02-17

## 2023-02-17 ENCOUNTER — OFFICE VISIT (OUTPATIENT)
Dept: FAMILY MEDICINE | Facility: CLINIC | Age: 38
End: 2023-02-17

## 2023-02-17 VITALS
OXYGEN SATURATION: 98 % | WEIGHT: 273.1 LBS | RESPIRATION RATE: 20 BRPM | TEMPERATURE: 98 F | HEART RATE: 78 BPM | DIASTOLIC BLOOD PRESSURE: 78 MMHG | BODY MASS INDEX: 44.76 KG/M2 | SYSTOLIC BLOOD PRESSURE: 122 MMHG

## 2023-02-17 DIAGNOSIS — G56.02 CARPAL TUNNEL SYNDROME OF LEFT WRIST: Primary | ICD-10-CM

## 2023-02-17 PROCEDURE — 99213 OFFICE O/P EST LOW 20 MIN: CPT | Performed by: EMERGENCY MEDICINE

## 2023-02-17 NOTE — TELEPHONE ENCOUNTER
Nurse Triage SBAR    Is this a 2nd Level Triage? YES, LICENSED PRACTITIONER REVIEW IS REQUIRED    Situation: Fingers numb    Background: Patient states that she woke up this morning with numb fingers on her right hand. She states the thumb, pointer, and ring finger are numb. She is able to move them. The numbness has not extended up her arm. She states that her fingers are warm to the touch. She denies any recent injury to the hand but states that she did slip on the ice yesterday but landed on her knee.     Assessment: Numb fingers    Protocol Recommended Disposition:   Go To Office Now    Recommendation:     patient will go to urgent care now.      Routed to provider     EMERY KLEIN RN      Does the patient meet one of the following criteria for ADS visit consideration? 16+ years old, with an [facility] PCP     TIP  Providers, please consider if this condition is appropriate for management at one of our Acute and Diagnostic Services sites.     If patient is a good candidate, please use dotphrase <dot>triageresponse and select Refer to ADS to document.    Reason for Disposition    Tingling (e.g., pins and needles) of the face, arm or leg on one side of the body, that is present now (Exceptions: Chronic or recurrent symptom lasting > 4 weeks; or tingling from known cause, such as: bumped elbow, carpal tunnel syndrome, pinched nerve, frostbite.)    Additional Information    Negative: Difficult to awaken or acting confused (e.g., disoriented, slurred speech)    Negative: New neurologic deficit that is present NOW, sudden onset of ANY of the following: * Weakness of the face, arm, or leg on one side of the body* Numbness of the face, arm, or leg on one side of the body* Loss of speech or garbled speech    Negative: Sounds like a life-threatening emergency to the triager    Negative: Confusion, disorientation, or hallucinations is main symptom    Negative: Dizziness is main symptom    Negative: Followed a head  injury within last 3 days    Negative: Headache (with neurologic deficit)    Negative: Unable to urinate (or only a few drops) and bladder feels very full    Negative: Loss of bladder or bowel control (urine or bowel incontinence; wetting self, leaking stool) of new-onset    Negative: Back pain with numbness (loss of sensation) in groin or rectal area    Negative: Neurologic deficit that was brief (now gone), ANY of the following: * Weakness of the face, arm, or leg on one side of the body * Numbness of the face, arm, or leg on one side of the body * Loss of speech or garbled speech    Negative: Patient sounds very sick or weak to the triager    Negative: Neurologic deficit of gradual onset (e.g., days to weeks), ANY of the following: * Weakness of the face, arm, or leg on one side of the body* Numbness of the face, arm, or leg on one side of the body* Loss of speech or garbled speech    Negative: Cambridge palsy suspected (i.e., weakness only one side of the face, developing over hours to days, no other symptoms)    Protocols used: NEUROLOGIC DEFICIT-A-OH

## 2023-02-17 NOTE — PROGRESS NOTES
Impression:  Carpal tunnel syndrome left wrist    Plan:  Wear a splint at night, follow-up with primary care in 1 month if the symptoms are not better, she should continue to get back on her diabetes medication      Chief Complaint:  Patient presents with:  Musculoskeletal Problem: Tingling and numbness sensation Lt thumb, pointer, middle and partial of ring fingers x this morning (see triage encounter). No swelling.          HPI:   Teetee Alvarado is a 38 year old female who presents to this clinic for the evaluation of numbness of the left hand.  Patient complains of numbness and tingling on the tips of her left thumb, index finger, middle finger, and half of the ring finger.  She woke up with the symptoms this morning.  She is never had them before.  Does not recall any trauma.  No other numbness or weakness anywhere.  No headache.  She does have diabetes type 2 and she has been off of her medications and is getting started back on them now      PMH:   No past medical history on file.  Past Surgical History:   Procedure Laterality Date     ABSCESS DRAINAGE Left     left leg     LASIK       WISDOM TOOTH EXTRACTION           ROS:  All other systems negative    Meds:    Current Outpatient Medications:      blood glucose (NO BRAND SPECIFIED) lancets standard, Use to test blood sugar two times daily or as directed., Disp: 100 lancet, Rfl: 6     ALPRAZolam (XANAX) 0.5 MG tablet, [ALPRAZOLAM (XANAX) 0.5 MG TABLET] Take 0.5-1 tablets (0.25-0.5 mg total) by mouth daily as needed for anxiety. (Patient not taking: Reported on 2/17/2023), Disp: 20 tablet, Rfl: 0     ASPIRIN LOW DOSE 81 MG EC tablet, TAKE 1 TABLET BY MOUTH EVERY DAY (Patient not taking: Reported on 2/17/2023), Disp: 90 tablet, Rfl: 3     blood glucose (CONTOUR NEXT TEST) test strip, 1 strip by In Vitro route 2 times daily (Patient not taking: Reported on 2/17/2023), Disp: 200 strip, Rfl: 6     famotidine (PEPCID) 20 MG tablet, Take 1 tablet (20 mg) by mouth 2  times daily as needed (reflux) (Patient not taking: Reported on 2/17/2023), Disp: 180 tablet, Rfl: 1     generic lancets (MICROLET LANCET), [GENERIC LANCETS (MICROLET LANCET)] Use 1 each As Directed 2 (two) times a day. Microlet lancets (Patient not taking: Reported on 2/17/2023), Disp: 100 each, Rfl: 6     metFORMIN (GLUCOPHAGE XR) 500 MG 24 hr tablet, Take 1 tablet (500 mg) by mouth daily (with breakfast) (Patient not taking: Reported on 2/17/2023), Disp: 90 tablet, Rfl: 0     methylphenidate (CONCERTA) 36 MG CR tablet, Take 2 tablets (72 mg) by mouth every morning (Patient not taking: Reported on 2/17/2023), Disp: 60 tablet, Rfl: 0     multivitamin (ONE A DAY) per tablet, [MULTIVITAMIN (ONE A DAY) PER TABLET] Take 1 tablet by mouth daily. (Patient not taking: Reported on 2/17/2023), Disp: , Rfl:      naproxen (NAPROSYN) 500 MG tablet, Take 1 tablet (500 mg) by mouth 2 times daily as needed for moderate pain (Patient not taking: Reported on 2/17/2023), Disp: 60 tablet, Rfl: 3     sertraline (ZOLOFT) 100 MG tablet, Take 1 tablet (100 mg) by mouth daily (Patient not taking: Reported on 2/17/2023), Disp: 90 tablet, Rfl: 0        Social:  Social History     Socioeconomic History     Marital status: Single     Spouse name: Not on file     Number of children: Not on file     Years of education: Not on file     Highest education level: Not on file   Occupational History     Not on file   Tobacco Use     Smoking status: Former     Packs/day: 0.00     Types: Cigarettes     Smokeless tobacco: Never     Tobacco comments:     vaping-daily   Substance and Sexual Activity     Alcohol use: Not Currently     Drug use: Yes     Frequency: 7.0 times per week     Types: Marijuana     Comment: Drug use: daily     Sexual activity: Not on file   Other Topics Concern     Not on file   Social History Narrative    Lives with cat.  Works at  [employer]     Social Determinants of Health     Financial Resource Strain: Not on file   Food  Insecurity: Not on file   Transportation Needs: Not on file   Physical Activity: Not on file   Stress: Not on file   Social Connections: Not on file   Intimate Partner Violence: Not on file   Housing Stability: Not on file         Physical Exam:  Vitals:    02/17/23 1149   BP: 122/78   BP Location: Right arm   Patient Position: Sitting   Cuff Size: Adult Large   Pulse: 78   Resp: 20   Temp: 98  F (36.7  C)   TempSrc: Oral   SpO2: 98%   Weight: 123.9 kg (273 lb 1.6 oz)      Patient is awake, alert, no distress  Eyes: PERRL, EOMI  Extremities: No tenderness or deformity, radial pulses normal, capillary refill is 1 second  Skin: No lesions or rash  Neuro: She complains of decreased sensation to light touch on the tips of the left thumb index middle and ring finger.  Phalen and Tinel sign are negative.  Sensation motor function is normal in all extremities otherwise  Psych: Awake, alert, normally responsive      Results:    No results found for this or any previous visit (from the past 24 hour(s)).           Avery Lui MD

## 2023-02-21 NOTE — TELEPHONE ENCOUNTER
Reason for Call:  Medication or medication refill:    Do you use a [facility]?  Name of the pharmacy and phone number for the current request: [facility]Y #[id number] - [facility], [place] - [address]    Name of the medication requested: methylphenidate HCl (CONCERTA) 36 MG CR tablet    Other request: pt is out of medication, would like it to be refilled before weekend if possible    Can we leave a detailed message on this number? Yes    Phone number patient can be reached at: Home number on file [phone] (home)    Best Time: na    Call taken on 3/5/2021 at 3:17 PM by Dione Hameed   No

## 2023-03-13 ENCOUNTER — OFFICE VISIT (OUTPATIENT)
Dept: FAMILY MEDICINE | Facility: CLINIC | Age: 38
End: 2023-03-13
Payer: COMMERCIAL

## 2023-03-13 ENCOUNTER — HOSPITAL ENCOUNTER (OUTPATIENT)
Dept: ULTRASOUND IMAGING | Facility: HOSPITAL | Age: 38
Discharge: HOME | End: 2023-03-13
Attending: FAMILY MEDICINE | Admitting: FAMILY MEDICINE
Payer: COMMERCIAL

## 2023-03-13 VITALS
HEIGHT: 66 IN | BODY MASS INDEX: 43.39 KG/M2 | HEART RATE: 95 BPM | WEIGHT: 270 LBS | OXYGEN SATURATION: 100 % | SYSTOLIC BLOOD PRESSURE: 113 MMHG | DIASTOLIC BLOOD PRESSURE: 66 MMHG

## 2023-03-13 DIAGNOSIS — Z79.4 TYPE 2 DIABETES MELLITUS WITH KETOACIDOSIS WITHOUT COMA, WITH LONG-TERM CURRENT USE OF INSULIN (H): ICD-10-CM

## 2023-03-13 DIAGNOSIS — E28.2 PCOS (POLYCYSTIC OVARIAN SYNDROME): ICD-10-CM

## 2023-03-13 DIAGNOSIS — Z32.01 PREGNANCY TEST POSITIVE: ICD-10-CM

## 2023-03-13 DIAGNOSIS — E11.10 TYPE 2 DIABETES MELLITUS WITH KETOACIDOSIS WITHOUT COMA, WITH LONG-TERM CURRENT USE OF INSULIN (H): ICD-10-CM

## 2023-03-13 DIAGNOSIS — N92.6 MISSED MENSES: ICD-10-CM

## 2023-03-13 LAB
HBA1C MFR BLD: 5.9 % (ref 0–5.6)
HCG UR QL: POSITIVE

## 2023-03-13 PROCEDURE — 83036 HEMOGLOBIN GLYCOSYLATED A1C: CPT | Performed by: FAMILY MEDICINE

## 2023-03-13 PROCEDURE — 36415 COLL VENOUS BLD VENIPUNCTURE: CPT | Performed by: FAMILY MEDICINE

## 2023-03-13 PROCEDURE — 99213 OFFICE O/P EST LOW 20 MIN: CPT | Performed by: FAMILY MEDICINE

## 2023-03-13 PROCEDURE — 81025 URINE PREGNANCY TEST: CPT | Performed by: FAMILY MEDICINE

## 2023-03-13 PROCEDURE — 76805 OB US >/= 14 WKS SNGL FETUS: CPT

## 2023-03-13 ASSESSMENT — PATIENT HEALTH QUESTIONNAIRE - PHQ9
SUM OF ALL RESPONSES TO PHQ QUESTIONS 1-9: 14
10. IF YOU CHECKED OFF ANY PROBLEMS, HOW DIFFICULT HAVE THESE PROBLEMS MADE IT FOR YOU TO DO YOUR WORK, TAKE CARE OF THINGS AT HOME, OR GET ALONG WITH OTHER PEOPLE: SOMEWHAT DIFFICULT
SUM OF ALL RESPONSES TO PHQ QUESTIONS 1-9: 14

## 2023-03-13 NOTE — LETTER
March 13, 2023      Teetee Alvarado  [address]  [place] [address]        Dear ,  We are writing to inform you of your test results.    Ruben Kingsley:  Your diabetes is well controlled.  The pregnancy test is POSITIVE as we discussed.  You have been scheduled for an OB ultrasound and follow up with your primary care provider.    Resulted Orders   HCG Qual, Urine ([id number])   Result Value Ref Range    hCG Urine Qualitative Positive (A) Negative      Comment:      This test is for screening purposes.  Results should be interpreted along with the clinical picture.  Confirmation testing is available if warranted by ordering [id number], HCG Quantitative Pregnancy.   HEMOGLOBIN A1C   Result Value Ref Range    Hemoglobin A1C 5.9 (H) 0.0 - 5.6 %      Comment:      Normal <5.7%   Prediabetes 5.7-6.4%    Diabetes 6.5% or higher     Note: Adopted from ADA consensus guidelines.       If you have any questions or concerns, please call the clinic at the number listed above.       Sincerely,      Bryson Gutierrez MD

## 2023-03-13 NOTE — PROGRESS NOTES
"  {  Subjective   Teetee is a 38 year old, presenting for the following health issues:  Amenorrhea (Positive home pregnancy test)      History of Present Illness       Reason for visit:  Pregnancy    She eats 0-1 servings of fruits and vegetables daily.She consumes 0 sweetened beverage(s) daily.She exercises with enough effort to increase her heart rate 9 or less minutes per day.  She exercises with enough effort to increase her heart rate 3 or less days per week. She is missing 7 dose(s) of medications per week.  She is not taking prescribed medications regularly due to side effects.    Today's PHQ-9         PHQ-9 Total Score: 14    PHQ-9 Q9 Thoughts of better off dead/self-harm past 2 weeks :   Not at all    How difficult have these problems made it for you to do your work, take care of things at home, or get along with other people: Somewhat difficult     No period since last summer.  Breast discomfort.  Positive home pregnancy test 3 days ago.  Some bloating.  Acid reflux since age 15.  No vaginal bleeding.  Off meds since Nov 2022  Had unprotected sex 1-13-23      Objective    /66   Pulse 95   Ht 1.664 m (5' 5.5\")   Wt 122.5 kg (270 lb)   LMP  (LMP Unknown)   SpO2 100%   BMI 44.25 kg/m    Body mass index is 44.25 kg/m .  Physical Exam   GENERAL: healthy, alert and no distress  RESP: lungs clear to auscultation - no rales, rhonchi or wheezes  CV: regular rate and rhythm, normal S1 S2, no S3 or S4, no murmur, click or rub, no peripheral edema and peripheral pulses strong  ABDOMEN: soft, nontender, no hepatosplenomegaly, no masses and bowel sounds normal    POSITIVE DOPTONES -160 in the mid supropubic area    No results found for: HCGQUANT        Encounter Diagnoses   Name Primary?     Pregnancy test positive, POSITIVE DOPTONES      Missed menses      Type 2 diabetes mellitus with ketoacidosis without coma, with long-term current use of insulin (H)      PCOS (polycystic ovarian syndrome)     "   PLAN:   OB ultrasound    Follow up with Dr Saxena     Precautions given, to ER for vaginal bleed or abd pain

## 2023-03-14 ENCOUNTER — TELEPHONE (OUTPATIENT)
Dept: FAMILY MEDICINE | Facility: CLINIC | Age: 38
End: 2023-03-14

## 2023-03-14 NOTE — TELEPHONE ENCOUNTER
Pt returned call, received message. Pt will be have a lot of questions for appointment with Dr Frank as pregnancy is a complete surprise

## 2023-03-14 NOTE — TELEPHONE ENCOUNTER
Left message to call back for: results  Information to relay to patient: LMTCB, Please see message below from provider.

## 2023-03-14 NOTE — TELEPHONE ENCOUNTER
----- Message from Bryson Gutierrez MD sent at 3/13/2023  5:15 PM CDT -----  Please confirm pt received her OB ultrasound report    Hi Teetee:  Your pregnancy is estimated to be 22 5/7 weeks along.  Please follow up with Dr Frank on March 28th  as scheduled.

## 2023-03-20 DIAGNOSIS — R73.03 PREDIABETES: ICD-10-CM

## 2023-03-20 DIAGNOSIS — O09.519 SUPERVISION OF HIGH-RISK PREGNANCY OF ELDERLY PRIMIGRAVIDA: Primary | ICD-10-CM

## 2023-03-20 DIAGNOSIS — E66.01 MORBID OBESITY (H): ICD-10-CM

## 2023-03-20 PROBLEM — F41.9 ANXIETY: Status: RESOLVED | Noted: 2018-10-05 | Resolved: 2023-03-20

## 2023-03-20 PROBLEM — Z87.898 HISTORY OF PREDIABETES: Status: RESOLVED | Noted: 2022-05-04 | Resolved: 2023-03-20

## 2023-03-23 ENCOUNTER — VIRTUAL VISIT (OUTPATIENT)
Dept: PSYCHIATRY | Facility: CLINIC | Age: 38
End: 2023-03-23
Attending: FAMILY MEDICINE
Payer: COMMERCIAL

## 2023-03-23 DIAGNOSIS — F41.1 GAD (GENERALIZED ANXIETY DISORDER): ICD-10-CM

## 2023-03-23 DIAGNOSIS — F33.1 MODERATE RECURRENT MAJOR DEPRESSION (H): Primary | ICD-10-CM

## 2023-03-23 DIAGNOSIS — F90.0 ATTENTION DEFICIT HYPERACTIVITY DISORDER (ADHD), PREDOMINANTLY INATTENTIVE TYPE: ICD-10-CM

## 2023-03-23 PROCEDURE — 99204 OFFICE O/P NEW MOD 45 MIN: CPT | Mod: VID | Performed by: NURSE PRACTITIONER

## 2023-03-23 ASSESSMENT — ANXIETY QUESTIONNAIRES
8. IF YOU CHECKED OFF ANY PROBLEMS, HOW DIFFICULT HAVE THESE MADE IT FOR YOU TO DO YOUR WORK, TAKE CARE OF THINGS AT HOME, OR GET ALONG WITH OTHER PEOPLE?: VERY DIFFICULT
7. FEELING AFRAID AS IF SOMETHING AWFUL MIGHT HAPPEN: NOT AT ALL
6. BECOMING EASILY ANNOYED OR IRRITABLE: NOT AT ALL
2. NOT BEING ABLE TO STOP OR CONTROL WORRYING: NEARLY EVERY DAY
7. FEELING AFRAID AS IF SOMETHING AWFUL MIGHT HAPPEN: NOT AT ALL
4. TROUBLE RELAXING: NEARLY EVERY DAY
3. WORRYING TOO MUCH ABOUT DIFFERENT THINGS: NEARLY EVERY DAY
5. BEING SO RESTLESS THAT IT IS HARD TO SIT STILL: NEARLY EVERY DAY
GAD7 TOTAL SCORE: 15
GAD7 TOTAL SCORE: 15
1. FEELING NERVOUS, ANXIOUS, OR ON EDGE: NEARLY EVERY DAY
IF YOU CHECKED OFF ANY PROBLEMS ON THIS QUESTIONNAIRE, HOW DIFFICULT HAVE THESE PROBLEMS MADE IT FOR YOU TO DO YOUR WORK, TAKE CARE OF THINGS AT HOME, OR GET ALONG WITH OTHER PEOPLE: VERY DIFFICULT
GAD7 TOTAL SCORE: 15

## 2023-03-23 ASSESSMENT — PATIENT HEALTH QUESTIONNAIRE - PHQ9
SUM OF ALL RESPONSES TO PHQ QUESTIONS 1-9: 16
SUM OF ALL RESPONSES TO PHQ QUESTIONS 1-9: 16
10. IF YOU CHECKED OFF ANY PROBLEMS, HOW DIFFICULT HAVE THESE PROBLEMS MADE IT FOR YOU TO DO YOUR WORK, TAKE CARE OF THINGS AT HOME, OR GET ALONG WITH OTHER PEOPLE: VERY DIFFICULT

## 2023-03-23 NOTE — NURSING NOTE
Is the patient currently in the state of [place]? YES    Visit mode:VIDEO    If the visit is dropped, the patient can be reconnected by: VIDEO VISIT: Text to cell phone:   Telephone Information:   Mobile [phone]       Will anyone else be joining the visit? No  (If patient encounters technical issues they should call [phone])    How would you like to obtain your AVS? MyChart    Are changes needed to the allergy or medication list? YES prenatal vitamin     Rooming Documentation: Assigned questionnaire(s) completed and Unable to complete qnrs due to time    Reason for visit:  Treatment    DANIEL Bethea

## 2023-03-23 NOTE — PROGRESS NOTES
"    Virtual Visit Details    Type of service:  Video Visit     Originating Location (pt. Location): Home    Distant Location (provider location):  On-site  Platform used for Video Visit: Breanna    Start time 1:17 PM  End time: 2:10 PM                                                               Outpatient Psychiatric Evaluation - Standard Adult    Name:  Teetee Alvarado  : 1985    Source of Referral:  Primary Care Provider: Rosa Frank DO/Philly Clancy MD  last visit: November 3, 2022  Current Psychotherapist: Yes    Identifying Data:  Patient is a 38 year old, single  White Not  or  female  who presents for initial visit with me.  Patient is currently employed full time. Patient attended the session alone. Consent to communicate signed for no one. Consent for treatment signed and included in electronic medical record. Discussed limits of confidentiality today. My Practice Policy was reviewed and signed.     Patient prefers to be called: Teetee     Chief Complaint:    No chief complaint on file.        HPI:      Teetee is a 38-year-old female visiting with me today to look at medication options to manage symptoms of ADHD, depression, and anxiety.  She tells me she had received ADHD medications in the past as she was diagnosed at age 21 years old and then again 5 years ago.  She had been prescribed methylphenidate but has not been taking anything now.  Teetee reports a short attention span and loses track of what she is talking about to others.  Sometimes it is difficult for her to sit still.  It is unable for her to complete tasks.  While she feels busy doing things, she admits that nothing gets done and she is having difficulty staying engaged at work.  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  Tasks that she has been assigned to such as keeping track of customer she sees is difficult for her to do.  Sometimes she gets \"stuck and is unable to do anything    Recently, Teetee found " "out that she is 6 months pregnant.  She has PCOS with irregular menses and thought nothing about the symptoms she had been experiencing.  Since she found out she has stopped smoking cannabis.  Her ex boyfriend is supportive and wants to coparent with her with this child.  However, she is not sure if she wants to establish a closer relationship with them.  Since the pregnancy she has lost 7 pounds.  Her hemoglobin A1c is 5.9.  She does have some difficulty falling asleep as not sure if this is due to do unexpected news of finding out about the pregnancy.  This situation has created some anxiety with rumination and racing thoughts.    Teetee tells me she has had depression since high school.  At the time she was placed on Paxil.  When she was prescribed Lamictal in her 20s for mood disorder symptoms, she tells me she \"felt drunk\".  She found her emotional dysregulation worse with tearful episodes.  She is not now has been practicing DBT skills to manage her mood symptoms.    Teetee's first OB appointment is not until March 28.  Today she is requesting a work accommodations letter to discuss ways that she can become more productive at her work.    Psychiatric Review of Symptoms:  Depression: Depressed Mood  Sleep: Decrease   Energy: Decrease  Concentration: Decrease  Ruminations: Increase    PHQ-9 scores:   PHQ-9 SCORE 5/4/2022 11/3/2022 3/13/2023   PHQ-9 Total Score MyChart - 12 (Moderate depression) 14 (Moderate depression)   PHQ-9 Total Score 8 12 14     Nazia:  Distractibility: Increase  Racing Thoughts: Increase   MDQ Score: Negative Screen  Anxiety: Feeling nervous, anxious, or on edge  Worrying too much about different things    JAE-7 scores:    JAE-7 SCORE 2/11/2021 5/3/2021 11/3/2022   Total Score - - 17 (severe anxiety)   Total Score 4 1 17     Panic:  No symptoms   Agoraphobia:  No   PTSD:  No symptoms   OCD:  No symptoms   Psychosis: No symptoms   ADD / ADHD: Attention Problem(s)  Problems with Listening  Task " Completion Difficulties  Poor Organization  Distractible  Previous Diagnosis  Previous Treatment: Methylphenidate  Gambling or shoplifting: No   Eating Disorder:  No symptoms  Sleep:   Trouble falling asleep     Psychiatric History:   Hospitalizations: None  History of Commitment? No   Past Treatment: counseling, medication(s) from physician / PCP and psychiatry  Suicide Attempts: None  Self-injurious Behavior: Denies  Electroconvulsive Therapy (ECT) or Transcranial Magnetic Stimulation (TMS): No   Genetic Testing: No     Substance Use History:  Current use of drugs or alcohol: Cannabis  and Methamphetamine    Patient reports no problems as a result of their drinking / drug use.   Based on the clinical interview, there  are not indications of drug or alcohol abuse.  Tobacco use: Yes E-cigarettes  Ready to quit?  Yes plans to cut back nicotine Replacement Therapy tried: None  Caffeine: No  Patient has not received chemical dependency treatment in the past  Recovery Programming Involvement: None    Past Medical History:  No past medical history on file.   Surgery:   Past Surgical History:   Procedure Laterality Date     ABSCESS DRAINAGE Left     left leg     LASIK       WISDOM TOOTH EXTRACTION       Allergies:   No Known Allergies  Primary Care Provider: Rosa Frank DO  Seizures or Head Injury: No  Diet: No Restrictions  Food Allergies: No   Exercise: No regular exercise program  Supplements: Reviewed per Electronic Medical Record Today    blood glucose (CONTOUR NEXT TEST) test strip, 1 strip by In Vitro route 2 times daily  blood glucose (NO BRAND SPECIFIED) lancets standard, Use to test blood sugar two times daily or as directed.  generic lancets (MICROLET LANCET), [GENERIC LANCETS (MICROLET LANCET)] Use 1 each As Directed 2 (two) times a day. Microlet lancets    No current facility-administered medications on file prior to visit.       The [place] Prescription Monitoring Program has been reviewed and there are  no concerns about diversionary activity for controlled substances at this time.    Vital Signs:  Vitals: LMP  (LMP Unknown)     Labs:  Most recent laboratory results reviewed and the pertinent results include: Hemoglobin A1c 5.9  Most recent labs reviewed and no new labs.   No EKG on file.    Review of Systems:  10 systems (general, cardiovascular, respiratory, eyes, ENT, endocrine, GI, , M/S, neurological) were reviewed. Most pertinent finding(s) is/are: Pregnancy, no shortness of breath, no skin rashes,. The remaining systems are all unremarkable.  Family History:   Patient reported family history includes:   Family History   Problem Relation Age of Onset     Diabetes Type 2  Father      Arthritis Father      Mental Illness History: Unknown  Substance Abuse History: Unknown  Suicide History: Yes: anxiety  Medications: Unknown     Social History:     [place]  [place] she was age 12 years old.  No trauma history reported  Siblings: Yes  Highest education level was high school graduate.   Employment History: Currently works at [employer] as a   Childhood illnesses: Denies  Current Living situation: [facility] with self. Feels safe at home.  Children: 25 weeks gestation  Firearms/Weapons Access: No: Patient denies   Service: No    Mental Status Examination:  Appearance: awake, alert and casual dress  Attitude: cooperative  Eye Contact:  adequate  Gait and Station: No dizziness or falls  Psychomotor Behavior:  intact station, gait and muscle tone  Oriented to:  time, person, and place  Attention Span and Concentration:  Normal  Speech:   vtspeech: clear, coherent and Speaks: English  Mood:  : anxious and depressed  Affect:  : mood congruent  Associations:  no loose associations  Thought Process:  goal oriented  Thought Content:  no evidence of suicidal ideation or homicidal ideation, no auditory hallucinations present and no visual hallucinations  present  Recent and Remote Memory:  intact Not formally assessed. No amnesia.  Fund of Knowledge: appropriate  Insight:  good  Judgment: good  Impulse Control:  good    Suicide Risk Assessment:  Today Teetee Alvarado reports no thoughts to harm themself or others. In addition, there are notable risk factors for self-harm, including single status, anxiety, comorbid medical condition of Pregnancy and mood change. However, risk is mitigated by commitment to family, history of seeking help when needed, future oriented, denies suicidal intent or plan and denies homicidal ideation, intent, or plan. Therefore, based on all available evidence including the factors cited above, Teetee Alvarado does not appear to be at imminent risk for self-harm, does not meet criteria for a 72-hr hold, and therefore remains appropriate for ongoing outpatient level of care.  A thorough assessment of risk factors related to suicide and self-harm have been reviewed and are noted above. The patient convincingly denies suicidality on several occasions. Local community safety resources printed and reviewed for patient to use if needed. There was no deceit detected, and the patient presented in a manner that was believable.     DSM5 Diagnosis:  Attention-Deficit/Hyperactivity Disorder  314.00 (F90.0) Predominantly inattentive presentation  296.32 (F33.1) Major Depressive Disorder, Recurrent Episode, Moderate _ and With mixed features  300.02 (F41.1) Generalized Anxiety Disorder    Medical Comorbidities Include:   Patient Active Problem List    Diagnosis Date Noted     Prediabetes 03/20/2023     Priority: Medium     Supervision of high-risk pregnancy of elderly primigravida 03/20/2023     Priority: Medium     Morbid obesity (H) 05/04/2022     Priority: Medium     Gastroesophageal reflux disease 01/21/2019     Priority: Medium     Anal fistula 12/10/2018     Priority: Medium     Formatting of this note might be different from the original.  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  HSV 2 IGG positive, HSV 1 IGG negative  Formatting of this note might be different from the original.  HSV 2 IGG positive, HSV 1 IGG negative       Smoker 05/18/2015     Priority: Medium       The Patient Activation Measure (DAPHNEY) score was completed and recorded in [employer]. This assesses patient knowledge, skill, and confidence for self-management. No flowsheet data found.             Impression:  Teetee Alvarado met with me today to inform me she recently found out that she is 6 months gestation.  She has not had her first OB appointment yet but that it is pending.  As she continues to adjust to this news, she has stopped all of her medications that she has taken for anxiety, depression, and ADHD.  At work she is telling me that her managers have placed her on a coaching schedule to help her get more organized and follow through better with tasks assigned to her.  She is looking for an accommodations letter to help her be more successful at her place of employment since she does not want to take medication for her symptoms..  We discussed potential options for medications to help her should her symptoms of anxiety and depression affect her pregnancy negatively.  Talk therapy is recommended to help her learn skills as she adjusts to new life stressors.  The father of her pregnancy would like to be involved, but she is hesitant to include him in her plans for the future.    Medication side effects and alternatives reviewed. Health promotion activities recommended and reviewed today. All questions addressed.  Education and counseling completed regarding risks and benefits of medications and psychotherapy options. Collaborative Care Psychiatry Service model reviewed today. Recommend therapy for additional support.     Treatment Plan:     1.  No medications will be prescribed at this point  2.  Should anxiety and depression symptoms elevate, consider adding sertraline or buspirone to give you relief.  Benadryl is available to help you sleep at night if you have worsening insomnia.  3.  Talk therapy to help you learn skills to adjust to life changes coming up as well as continued life stresses  4.  An accommodations letter will be compiled to give you support at your place of employment.  I will wait to hear from you regarding specific needs.      Continue all other medical directions per primary care provider.     Continue all other medications as reviewed per electronic medical record today.     Safety plan reviewed. To the Emergency Department as needed or call after hours crisis line at [phone] or [phone]. [place] Crisis Text Line: Text [place] to 460211  or  Suicide LifeLine Chat: suicidepreventionScylab medicline.org/chat/    To schedule individual or family therapy, call [facility] at [phone].     Schedule an appointment with me as needed.  Call [facility] at [phone] to schedule.    Follow up with primary care provider as planned or for acute medical concerns.    Call the psychiatric nurse line with medication questions or concerns at [phone].    Airpush may be used to communicate with your provider, but this is not intended to be used for emergencies.    Crisis Resources:    National Suicide Prevention Lifeline: 481.711.3649 (TTY: 189.543.1872). Call anytime for help.  (www.suicidepreventionlifeline.org)  National [place] on Mental Illness (www.crist[place].org): 387.284.5747 or 406-015-3288.   Mental Health Association (www.mentalhealth.org): 961.640.9927 or  804.629.7883.  [place] Crisis Text Line: Text [place] to 624375  Suicide LifeLine Chat: suicidepreventionlifeline.org/chat    Administrative Billing:   Time spent with patient included counseling and coordination of care regarding above diagnoses and treatment plan.    Patient Status:  The patient is being returned to the primary care provider for ongoing care and medication prescribing.      Signed:   HI Headley-BC   Psychiatry  Answers for HPI/ROS submitted by the patient on 3/23/2023  If you checked off any problems, how difficult have these problems made it for you to do your work, take care of things at home, or get along with other people?: Very difficult  PHQ9 TOTAL SCORE: 16  JAE 7 TOTAL SCORE: 15

## 2023-03-27 LAB
ABO/RH(D): NORMAL
ANTIBODY SCREEN: NEGATIVE
SPECIMEN EXPIRATION DATE: NORMAL

## 2023-03-28 ENCOUNTER — PRENATAL OFFICE VISIT (OUTPATIENT)
Dept: FAMILY MEDICINE | Facility: CLINIC | Age: 38
End: 2023-03-28
Payer: COMMERCIAL

## 2023-03-28 VITALS
HEART RATE: 106 BPM | BODY MASS INDEX: 44.65 KG/M2 | SYSTOLIC BLOOD PRESSURE: 125 MMHG | HEIGHT: 65 IN | WEIGHT: 268 LBS | OXYGEN SATURATION: 97 % | DIASTOLIC BLOOD PRESSURE: 72 MMHG

## 2023-03-28 DIAGNOSIS — F90.0 ATTENTION DEFICIT HYPERACTIVITY DISORDER (ADHD), PREDOMINANTLY INATTENTIVE TYPE: ICD-10-CM

## 2023-03-28 DIAGNOSIS — R73.09 ELEVATED GLUCOSE TOLERANCE TEST: ICD-10-CM

## 2023-03-28 DIAGNOSIS — F41.9 ANXIETY AND DEPRESSION: ICD-10-CM

## 2023-03-28 DIAGNOSIS — O09.522 SUPERVISION OF HIGH RISK ELDERLY MULTIGRAVIDA IN SECOND TRIMESTER: Primary | ICD-10-CM

## 2023-03-28 DIAGNOSIS — F32.A ANXIETY AND DEPRESSION: ICD-10-CM

## 2023-03-28 DIAGNOSIS — R73.03 PREDIABETES: ICD-10-CM

## 2023-03-28 DIAGNOSIS — O09.519 SUPERVISION OF HIGH-RISK PREGNANCY OF ELDERLY PRIMIGRAVIDA: ICD-10-CM

## 2023-03-28 DIAGNOSIS — O09.522 MULTIGRAVIDA OF ADVANCED MATERNAL AGE IN SECOND TRIMESTER: ICD-10-CM

## 2023-03-28 DIAGNOSIS — E66.01 MORBID OBESITY (H): ICD-10-CM

## 2023-03-28 DIAGNOSIS — R76.8 HSV-2 SEROPOSITIVE: ICD-10-CM

## 2023-03-28 LAB
ERYTHROCYTE [DISTWIDTH] IN BLOOD BY AUTOMATED COUNT: 12.8 % (ref 10–15)
GLUCOSE 1H P 50 G GLC PO SERPL-MCNC: 145 MG/DL (ref 70–129)
HCT VFR BLD AUTO: 38.7 % (ref 35–47)
HGB BLD-MCNC: 13.6 G/DL (ref 11.7–15.7)
MCH RBC QN AUTO: 30.2 PG (ref 26.5–33)
MCHC RBC AUTO-ENTMCNC: 35.1 G/DL (ref 31.5–36.5)
MCV RBC AUTO: 86 FL (ref 78–100)
PLATELET # BLD AUTO: 313 10E3/UL (ref 150–450)
RBC # BLD AUTO: 4.5 10E6/UL (ref 3.8–5.2)
WBC # BLD AUTO: 12.3 10E3/UL (ref 4–11)

## 2023-03-28 PROCEDURE — 87491 CHLMYD TRACH DNA AMP PROBE: CPT | Performed by: FAMILY MEDICINE

## 2023-03-28 PROCEDURE — 36415 COLL VENOUS BLD VENIPUNCTURE: CPT | Performed by: FAMILY MEDICINE

## 2023-03-28 PROCEDURE — 85027 COMPLETE CBC AUTOMATED: CPT | Performed by: FAMILY MEDICINE

## 2023-03-28 PROCEDURE — 87340 HEPATITIS B SURFACE AG IA: CPT | Performed by: FAMILY MEDICINE

## 2023-03-28 PROCEDURE — 86780 TREPONEMA PALLIDUM: CPT | Performed by: FAMILY MEDICINE

## 2023-03-28 PROCEDURE — 99214 OFFICE O/P EST MOD 30 MIN: CPT | Performed by: FAMILY MEDICINE

## 2023-03-28 PROCEDURE — 87389 HIV-1 AG W/HIV-1&-2 AB AG IA: CPT | Performed by: FAMILY MEDICINE

## 2023-03-28 PROCEDURE — 87086 URINE CULTURE/COLONY COUNT: CPT | Performed by: FAMILY MEDICINE

## 2023-03-28 PROCEDURE — 87591 N.GONORRHOEAE DNA AMP PROB: CPT | Performed by: FAMILY MEDICINE

## 2023-03-28 PROCEDURE — 86850 RBC ANTIBODY SCREEN: CPT | Performed by: FAMILY MEDICINE

## 2023-03-28 PROCEDURE — 86762 RUBELLA ANTIBODY: CPT | Performed by: FAMILY MEDICINE

## 2023-03-28 PROCEDURE — 86900 BLOOD TYPING SEROLOGIC ABO: CPT | Performed by: FAMILY MEDICINE

## 2023-03-28 PROCEDURE — 86901 BLOOD TYPING SEROLOGIC RH(D): CPT | Performed by: FAMILY MEDICINE

## 2023-03-28 PROCEDURE — 99207 PR FIRST OB VISIT: CPT | Performed by: FAMILY MEDICINE

## 2023-03-28 PROCEDURE — 82950 GLUCOSE TEST: CPT | Performed by: FAMILY MEDICINE

## 2023-03-28 RX ORDER — PRENATAL VIT/IRON FUM/FOLIC AC 27MG-0.8MG
1 TABLET ORAL DAILY
COMMUNITY
End: 2024-04-16

## 2023-03-28 NOTE — PROGRESS NOTES
"    SUBJECTIVE:     HPI:    This is a 38 year old female patient,  who presents for her first obstetrical visit.    DON: 2023 by 22-week ultrasound.  She is 24w6d weeks.  Her cycles are irregular.  Her last menstrual period was abnormal.   Since her LMP, she has experienced  nausea, emesis, fatigue and urinary urgency).   She denies abdominal pain and vaginal bleeding.    Additional History:     Initially seen 3/13/2023 due to positive pregnancy test.  Has history of PCOS and so rarely gets periods.  Surprised to see a positive pregnancy test and came in for a pregnancy confirmation.  ADHA was on ritalin last used about 4 weeks gestation. Had been on sertraline for anxiety/depression and metformin for prediabetes but has since discontinued all medications except prenatal vitamin.  She has felt fetal movement.    Have you travelled during the pregnancy?No  Have your sexual partner(s) travelled during the pregnancy?No    HISTORY:   Planned Pregnancy: No  Marital Status: partner, Joe  Occupation: sales, works from home  Living in Household: Other:  cat    Past History:  Her past medical history:  GERD, obesity, PCOS, prediabetes, HSV 2, ADHD, anxiety/depression, marijuana use    She has a history of no prior known pregnancies    Since she found out she was pregnant denies use of alcohol, tobacco and street drugs.    Past medical, surgical, social and family history were reviewed and updated in [facility].    Current Outpatient Medications   Medication     Prenatal Vit-Fe Fumarate-FA (PRENATAL MULTIVITAMIN W/IRON) 27-0.8 MG tablet     No current facility-administered medications for this visit.       OBJECTIVE:     EXAM:  /72   Pulse 106   Ht 1.645 m (5' 4.75\")   Wt 121.6 kg (268 lb)   LMP  (LMP Unknown)   SpO2 97%   BMI 44.94 kg/m   Body mass index is 44.94 kg/m .    GENERAL: healthy, alert and no distress  RESP: Breathing comfortably  CV: Extremities well-perfused  ABDOMEN: Gravid, fetal heart rate " 155, measuring 24 cm  NEURO: Normal strength and tone, mentation intact and speech normal  PSYCH: mentation appears normal, affect normal/bright    Preeclampsia risk factors:    High risk factors (1+): none    Moderate risk factors (2+): nulliparity, obesity, age, prediabetes      ASSESSMENT/PLAN:       ICD-10-CM    1. Supervision of high risk elderly multigravida in second trimester  O09.522 ABO/Rh type and screen     Hepatitis B surface antigen     CBC with platelets     HIV Antigen Antibody Combo     Rubella Antibody IgG     Treponema Abs w Reflex to RPR and Titer     Urine Culture Aerobic Bacterial     Neisseria gonorrhoeae PCR     Chlamydia trachomatis PCR     [facility] Referral - Pregnancy     ABO/Rh type and screen     Hepatitis B surface antigen     CBC with platelets     HIV Antigen Antibody Combo     Rubella Antibody IgG     Treponema Abs w Reflex to RPR and Titer     Urine Culture Aerobic Bacterial     Neisseria gonorrhoeae PCR     Chlamydia trachomatis PCR     AMB Adult Diabetes Educator Referral      2. Morbid obesity (H)  E66.01 [facility] Fetal [facility]tr Referral - Pregnancy     Glucose tolerance, gest screen, 1 hour     AMB Adult Diabetes Educator Referral      3. Multigravida of advanced maternal age in second trimester  O09.522 [facility] Referral - Pregnancy      4. Prediabetes  R73.03 [facility] Referral - Pregnancy     Glucose tolerance, gest screen, 1 hour     AMB Adult Diabetes Educator Referral      5. HSV-2 seropositive  R76.8 Mat Fetal Med [facility]tr Referral - Pregnancy      6. Attention deficit hyperactivity disorder (ADHD), predominantly inattentive type  F90.0 [facility]tr Referral - Pregnancy      7. Anxiety and depression  F41.9     F32.A       8. Supervision of high-risk pregnancy of elderly primigravida  O09.519 Glucose tolerance, gest screen, 1 hour      9. Elevated glucose tolerance test  R73.09 AMB Adult Diabetes Educator Referral          38 year old , 24w6d weeks  of pregnancy with DON of 7/12/2023 by 22-week ultrasound.  Unplanned but now desired pregnancy.  History of PCOS with amenorrhea so was unaware she was pregnant until second trimester.  Has since discontinued methylphenidate for ADHD, sertraline for anxiety/depression, and metformin for prediabetes history.  She continues to follow with psychiatry and at this time declines need to restart sertraline for mood impairment.  Risk factors include AMA, BMI greater than 40, and history of HSV.  Recommended weight gain throughout pregnancy 11 to 20 pounds.  Recently had normal fetal survey but will need growth ultrasound at 32 weeks.  Elevated 1 hour GTT today's we will have her return for a 3-hour GTT. Given history of prediabetes will refer to diabetes educator proactively.  Depending on these readings, low threshold for insulin therapy through endocrinology.  MFM referral placed for consultation with additional recommendations.  Likely will need weekly testing starting at 36 weeks and delivery at 39 weeks.    Due to risk factors including nulliparity, obesity, age, and prediabetes recommend initiation of low-dose aspirin 81 mg daily until 36 weeks for preeclampsia prophylaxis.    Reviewed second trimester expectations.  Recommend follow-up in 3 weeks.    Rosa Frank DO

## 2023-03-28 NOTE — PATIENT INSTRUCTIONS
[facility] Information  If you have any further concerns or wish to schedule another appointment, please call our office at [phone].  Call your doctor if you experience any bleeding or abdominal cramping early in pregnancy.     For uncomplicated pregnancies, you will be seeing your doctor once a month until you are 28 weeks, then you will see your doctor twice a month. You will begin weekly visits at 36 weeks until you deliver.     If you have a medical emergency, please call [phone].    [facility] Labor and Delivery: [phone]  [facility] Labor and Delivery: [phone]    When to call or come in to the hospital  If you notice a decrease in your baby's movement.   If your begin to experience contractions that are 5 minutes or less apart and lasting for over 45 seconds, or if contractions are becoming more painful.  If you have any bleeding or leakage of fluids.   If you have a headache not resolved with tylenol, right upper abdominal pain, or sudden onset of swelling.  You know your body best. Never hesitate to call or go to the hospital if something doesn't feel right!  Blood Glucose Screening During Pregnancy   Gestational diabetes is diabetes that only pregnant women get. Changes in your body during pregnancy can cause high blood sugar (glucose). This can cause problems for you and your baby. It is a serious condition, but it can be controlled.  What to Know If You Test Positive   Gestational diabetes is treatable. The best way to control gestational diabetes is to find out you have it as early as possible and start treatment quickly.   Gestational diabetes can cause problems for the mother during pregnancy. It can also cause problems with the baby during pregnancy, delivery, and after. Treatment greatly lowers the chance for problems.   The changes in your body that cause gestational diabetes normally occur only when you are pregnant. After the baby is born, your body goes back to normal and the  condition goes away. You may be more likely to have type 2 diabetes later, though. So talk to your doctor about ways to help prevent type 2 diabetes.  Treating Gestational Diabetes   You ll need to check your blood sugar regularly. You can do this at home by pricking your finger and checking a drop of blood on a glucose monitor. Your health care provider will show you how and when to check your blood sugar and discuss your target blood sugar level.   To manage your blood sugar, you will be given a special plan. It will likely involve planning your meals and getting regular exercise. Some women need to take a hormone called insulin, or an oral hypoglycemic medication to help control their blood sugar.    Making Plans for Feeding Your Baby  By this point, you probably have read a lot about feeding your baby. Breastfeed or formula? Each mother s decision is her own and [facility] respects you and your choices. We ve gathered information on both breastfeeding and formula feeding to help with your decision. Talking with your physician or nurse-midwife can also help in your decision. However you plan to feed your baby, [facility] encourage rooming in with your baby, skin-to-skin contact and feeding your baby based on his or her cues.    Skin-to-skin contact  Being close to mom helps your baby adjust to life outside of the womb. It helps your baby regulate their body temperature, heart rate, and breathing. Your baby will usually be placed skin-to-skin immediately following birth or as soon as possible, if medical intervention is needed.    Rooming-In  Having your baby stay with you in your room is called  rooming-in.  Keeping your baby in your room helps you to learn how to care for your baby by getting to know your baby s cues, body rhythms and sleep cycle.       Cue-based feeding  Cues (signals) are baby s way of telling you what he or she wants. When you learn your infant s cues, you know how to  care for and feed your baby. Feeding cues are the licking and smacking of lips, bringing their fist to their mouth, and a reflex called  rooting - where baby turns and opens his or her mouth, searching for the breast or bottle. Crying is a late feeding cue. Babies can feed frequently, often at least 8 times in 24 hours.  Breastfeeding facts  Breast milk is the best source of nutrition for your baby and is available at birth. In the first couple of days, your milk volume is already starting to increase, though it may not be noticeable. Breastfeed frequently to increase your milk supply. Within three to five days, you will begin to notice larger milk volumes. An increase in breast size, heaviness and firmness are often described as the milk  coming in.  Frequent breastfeeding can help breasts from getting overly firm and painful. You will know the baby is getting enough milk if your baby is having wet and dirty diapers and gaining weight.     If your goal is to exclusively breastfeed, it is important to not use any formula or artificial nipples (including bottles and pacifiers) while your baby is learning to breastfeed. While it may seem like an  easy  option to give your baby a bottle, formula should only be given if there is a medical reason for your baby to have it.    Positioning and attachment   Get comfortable. Use pillows as needed to support your arms and baby. Hold baby close at the level of your breast, facing you in a tummy to tummy position. Skin to skin helps with this. Position the baby with his or her nose by the nipple. There should be a straight line from baby s ear to shoulder to hips.  Tickle your baby s lips or wait for baby to open mouth wide, bring baby to breast by leading with the chin. Aim the nipple at the roof of baby s mouth. A rapid sucking pattern is followed by longer, drawing pattern with occasional swallows heard. When baby is correctly latched, your nipple and much of the areola are  pulled well into baby s mouth.      Returning to work or school  Focus on a good start to breastfeeding. Many women continue to provide breastmilk for their baby when they return to work or school. Making plans about where to pump and store milk can make the transition go well. Talk with other mothers who have also returned to work or school for tips and support. Your employer s Human Resource department may be a resource as well.      babies can mean fewer  sick  days for you.  A quality breast pump will also save time and add comfort. Check with your insurance prior to giving birth for breast pump coverage. Many insurance companies include a pump within your benefits.  Wait until your baby is at least three weeks old to introduce a bottle for the first time. Have someone besides you give the bottle.  Breastfeed when you are with your baby. Reserve your bottles of breast milk for when you are away.   Your breasts will need to be  emptied  either by your baby or a pump. Plan to pump at least twice in an eight hour day.  If you cannot pump at work, continue breastfeeding at home. Any amount of breast milk is worth giving to your baby.    Formula feeding facts  If you are planning to use formula to feed your baby, you will want to make some preparations ahead of time. Talk to your doctor or nurse-midwife about what type of formula to use. Some are iron-fortified, meaning they have extra iron in them. You will want to purchase formula and bottles before your baby is born to be sure you are ready after you return from the hospital. The [facility] do not provide formula samples to take home.    Be sure to follow formula mixing directions closely. Regular milk in the dairy case at the grocery store should not be given to babies under 1 year old. Baby formula is sold in several forms including:  Ready-to-use. This is the most expensive, but no mixing is necessary.  Concentrated liquid. This is less  expensive than ready-to-use and you mix with water.  Powder. This is the least expensive. You mix one level scoop of powdered formula with two ounces of water and stir well.    Most babies need 2.5 ounces of formula per pound of body weight each day. This means an 8-pound baby may drink about 20 ounces of formula a day; however, this is just an estimate. The most important thing is to pay attention to your baby s cues. If your baby is always fussy, needs more iron or has certain food allergies, your physician may suggest you change your baby s formula to a different kind.     How do I warm my baby s bottles?  You may feed your baby a bottle without warming it first. It is OK for the breast milk or formula to be cool or room temperature. If your baby seems to prefer it warmed, you can put the filled bottle in a container of warm water and let it stand for a few minutes. Check the temperature of the liquid on your skin before feeding it to your baby; to be sure it isn t too hot. Do not heat bottles in the microwave. Microwaves heat food and liquids unevenly, and this can cause hot spots that can burn your baby.    How do I clean and sterilize bottles?  Sterilize bottles and nipples before you use them for the first time. You can do this by putting them in boiling water for 5 minutes. After that first time, you can wash them in hot and soapy water. Rinse them carefully to be sure there is no soap left on them. You can also wash them in the .    [facility] [phone] ([phone])

## 2023-03-29 DIAGNOSIS — R73.09 ELEVATED GLUCOSE TOLERANCE TEST: ICD-10-CM

## 2023-03-29 DIAGNOSIS — E66.01 MORBID OBESITY (H): Primary | ICD-10-CM

## 2023-03-29 DIAGNOSIS — R73.03 PREDIABETES: ICD-10-CM

## 2023-03-29 DIAGNOSIS — O09.522 MULTIGRAVIDA OF ADVANCED MATERNAL AGE IN SECOND TRIMESTER: ICD-10-CM

## 2023-03-29 LAB
C TRACH DNA SPEC QL NAA+PROBE: NEGATIVE
HBV SURFACE AG SERPL QL IA: NONREACTIVE
HIV 1+2 AB+HIV1 P24 AG SERPL QL IA: NONREACTIVE
N GONORRHOEA DNA SPEC QL NAA+PROBE: NEGATIVE
RUBV IGG SERPL QL IA: 1.42 INDEX
RUBV IGG SERPL QL IA: POSITIVE
T PALLIDUM AB SER QL: NONREACTIVE

## 2023-03-30 ENCOUNTER — PRE VISIT (OUTPATIENT)
Dept: MATERNAL FETAL MEDICINE | Facility: CLINIC | Age: 38
End: 2023-03-30
Payer: COMMERCIAL

## 2023-03-30 LAB — BACTERIA UR CULT: NORMAL

## 2023-03-30 NOTE — PATIENT INSTRUCTIONS
"Patient Education   The [facility] Program  What to Expect  Here's what to expect in the Panel Psychiatry Program.   About the program  You'll be meeting with a psychiatric doctor to check your mental health. A psychiatric doctor helps you deal with troubling thoughts and feelings by giving you medicine. They'll make sure you know the plan for your care. You may see them for a long time. When you're feeling better, they may refer you back to seeing your family doctor.   If you have any questions, we'll be glad to talk to you.  About visits  Be open  At your visits, please talk openly about your problems. It may feel hard, but it's the best way for us to help you.  Cancelling visits  If you can't come to your visit, please call us right away at [phone]. If you don't cancel at least 24 hours (1 full day) before your visit, that's \"late cancellation.\"  Not showing up for your visits  Being very late is the same as not showing up. You'll be a \"no show\" if:  You're more than 15 minutes late for a 30-minute (half hour) visit.  You're more than 30 minutes late for a 60-minute (full hour) visit.  If you cancel late or don't show up 2 times within 6 months, we may end your care.  Getting help between visits  If you need help between visits, you can call us Monday to Friday from 8 a.m. to 4:30 p.m. at [phone].  Emergency care  Call 911 or go to the nearest emergency department if your life or someone else's life is in danger.  Call 988 anytime to reach the national Suicide and Crisis hotline.  Medicine refills  To refill your medicine, call your pharmacy. You can also call [facility] at [phone], Monday to Friday, 8 a.m. to 4:30 p.m. It can take 1 to 3 business days to get a refill.   Forms, letters, and tests  You may have papers to fill out, like FMLA, short-term disability, and workability. We can help you with these forms at your visits, but you must have an " appointment. You may need more than 1 visit for this, to be in an intensive therapy program, or both.  Before we can give you medicine for ADHD, we may refer you to get tested for it or confirm it another way.  We may not be able to give you an emotional support animal letter.  We don't do mental health checks ordered by the court.   We don't do mental health testing, but we can refer you to get tested.   Thank you for choosing us for your care.  For informational purposes only. Not to replace the advice of your health care provider. Copyright   2022 [employer]. All rights reserved. [place] [id number] - 12/22.  Treatment plan:   1.  No medications will be prescribed at this point  2.  Should anxiety and depression symptoms elevate, consider adding sertraline or buspirone to give you relief.  Benadryl is available to help you sleep at night if you have worsening insomnia.  3.  Talk therapy to help you learn skills to adjust to life changes coming up as well as continued life stresses  4.  An accommodations letter will be compiled to give you support at your place of employment.  I will wait to hear from you regarding specific needs.  Continue all other medical directions per primary care provider.   Continue all other medications as reviewed per electronic medical record today.   Safety plan reviewed. To the Emergency Department as needed or call after hours crisis line at [phone] or [phone]. [place] Crisis Text Line: Text [place] to 519451  or  Suicide LifeLine Chat: suicidepreventionlifeline.org/chat/  To schedule individual or family therapy, call [facility] at [phone].   Schedule an appointment with me as needed.  Call [facility] at [phone] to schedule.  Follow up with primary care provider as planned or for acute medical concerns.  Call the psychiatric nurse line with medication questions or concerns at [phone].  payever may be used to  communicate with your provider, but this is not intended to be used for emergencies.    Crisis Resources:    National Suicide Prevention Lifeline: 706.923.5964 (TTY: 964.996.2747). Call anytime for help.  (www.suicidepreventionlifeline.org)  National [place] on Mental Illness (www.cristopher.org): 923.389.2506 or 669-973-4757.   Mental Health Association (www.mentalhealth.org): 462.156.4302 or 197-244-4681.  [place] Crisis Text Line: Text [place] to 845718  Suicide LifeLine Chat: suicidepreventionlifeline.org/chat

## 2023-04-04 ENCOUNTER — OFFICE VISIT (OUTPATIENT)
Dept: MATERNAL FETAL MEDICINE | Facility: CLINIC | Age: 38
End: 2023-04-04
Attending: OBSTETRICS & GYNECOLOGY
Payer: COMMERCIAL

## 2023-04-04 ENCOUNTER — HOSPITAL ENCOUNTER (OUTPATIENT)
Dept: ULTRASOUND IMAGING | Facility: CLINIC | Age: 38
Discharge: HOME | End: 2023-04-04
Attending: OBSTETRICS & GYNECOLOGY
Payer: COMMERCIAL

## 2023-04-04 ENCOUNTER — TELEPHONE (OUTPATIENT)
Dept: FAMILY MEDICINE | Facility: CLINIC | Age: 38
End: 2023-04-04

## 2023-04-04 VITALS — HEART RATE: 104 BPM | DIASTOLIC BLOOD PRESSURE: 66 MMHG | SYSTOLIC BLOOD PRESSURE: 130 MMHG

## 2023-04-04 DIAGNOSIS — R73.03 PREDIABETES: ICD-10-CM

## 2023-04-04 DIAGNOSIS — R73.09 ELEVATED GLUCOSE TOLERANCE TEST: ICD-10-CM

## 2023-04-04 DIAGNOSIS — O09.522 MULTIGRAVIDA OF ADVANCED MATERNAL AGE IN SECOND TRIMESTER: Primary | ICD-10-CM

## 2023-04-04 DIAGNOSIS — O09.522 MULTIGRAVIDA OF ADVANCED MATERNAL AGE IN SECOND TRIMESTER: ICD-10-CM

## 2023-04-04 DIAGNOSIS — E66.01 MORBID OBESITY (H): ICD-10-CM

## 2023-04-04 DIAGNOSIS — O99.212 MATERNAL OBESITY, ANTEPARTUM, SECOND TRIMESTER: ICD-10-CM

## 2023-04-04 PROCEDURE — 99203 OFFICE O/P NEW LOW 30 MIN: CPT | Mod: 25 | Performed by: OBSTETRICS & GYNECOLOGY

## 2023-04-04 PROCEDURE — 76811 OB US DETAILED SNGL FETUS: CPT | Mod: 26 | Performed by: OBSTETRICS & GYNECOLOGY

## 2023-04-04 PROCEDURE — 96040 HC GENETIC COUNSELING, EACH 30 MINUTES: CPT | Performed by: GENETIC COUNSELOR, MS

## 2023-04-04 PROCEDURE — 76811 OB US DETAILED SNGL FETUS: CPT

## 2023-04-04 NOTE — TELEPHONE ENCOUNTER
First Attempt: I sent a my chart message to the patient to please contact our office to schedule a 3 hr glucose lab appt.

## 2023-04-04 NOTE — PROGRESS NOTES
"[facility]  Genetic Counseling Consult    Patient:  Teetee Alvarado YOB: 1985   Date of Service:  23   MRN: [id number]    Teetee was seen at the [facility] [place] for genetic counseling consultation as part of her appointment for level II comprehensive ultrasound and MFM consult due to advanced maternal age. The patient was accompanied by her partner, Joe to today's visit.     IMPRESSION/ PLAN   1. Teetee has not had screening in this pregnancy. Today we reviewed availability of NIPT and genetic amniocentesis. Teetee declines screening and diagnostic testing at this time, however is aware these remain available.     2. Teetee had a comprehensive (level II) ultrasound and MFM consultation today.  Please see the corresponding reports for further details.    PREGNANCY HISTORY   /Parity:    Age at Delivery: 38 year old  Gestational Age: 25w6d   DON: 2023, by Ultrasound             No significant complications were reported in the current pregnancy.    MEDICAL HISTORY   Teetee s reported medical history is not expected to impact pregnancy management or risks to fetal development.       FAMILY HISTORY   A three-generation pedigree was obtained today and is scanned under the \"Media\" tab in Epic.     The following significant findings were reported today:     Joe's maternal uncle was reported to have intellectual disability and lived in a group home. Joe described him as having a different appearance from the rest of the family. We reviewed that intellectual disability can be isolated or seen in the setting of an underlying genetic etiology or syndrome. When isolated it is thought to be multifactorial, resulting from a combination of genetic and non genetic factors. When part of an underlying syndrome, inheritance depends on the specific condition. In the absence of a known underlying cause, risk assessment is " challenging. They were encouraged to share this family history with their pediatrician.     Otherwise, the reported family history is unremarkable for multiple miscarriages, stillbirths, birth defects, intellectual disabilities, known genetic conditions, and consanguinity.       CARRIER SCREENING   Expanded carrier screening is available to screen for autosomal recessive conditions and X-linked conditions in a large list of genes. Autosomal recessive conditions happen when a mutation has been inherited from the egg and sperm and include conditions like cystic fibrosis, thalassemia, hearing loss, spinal muscular atrophy, and more. X-linked conditions happen when a mutation has been inherited from the egg and include conditions like fragile X syndrome.  screening was also reviewed.    The patient did not elect to pursue the carrier screening options discussed today, however is aware these remain available.        RISK ASSESSMENT FOR CHROMOSOME CONDITIONS   We explained that the risk for fetal chromosome abnormalities increases with maternal age. We discussed specific features of common chromosome abnormalities, including Down syndrome, trisomy 13, trisomy 18, and sex chromosome trisomies.      At age 38 at midtrimester, the risk to have a baby with Down syndrome is 1 in 129.     At age 38 at midtrimester, the risk to have a baby with any chromosome abnormality is 1 in 65.     GENETIC TESTING OPTIONS   Genetic testing during a pregnancy includes screening and diagnostic procedures.      We discussed the following screening options:   Non-invasive prenatal testing (NIPT)    Also called cell-free DNA screening because it detects chromosomes from the placenta in the pregnant person's blood    Can be done any time after 10 weeks gestation    Screens for trisomy 21, trisomy 18, trisomy 13, and sex chromosome aneuploidies    Cannot screen for open neural tube defects, maternal serum AFP after 15 weeks is  recommended      We discussed the following ultrasound options:  Comprehensive level II ultrasound (Fetal Anatomy Ultrasound)    Ultrasound done between 18-20 weeks gestation    Screens for major birth defects and markers for aneuploidy (like trisomy 21 and trisomy 18)    Includes looking at the fetus/baby's growth, heart, organs (stomach, kidneys), placenta, and amniotic fluid      We discussed the following diagnostic options:   Amniocentesis    Invasive diagnostic procedure done after 15 weeks gestation    The procedure collects a small sample of amniotic fluid for the purpose of chromosomal testing and/or other genetic testing    Diagnostic result; more than 99% sensitivity for fetal chromosome abnormalities    Testing for AFP in the amniotic fluid can test for open neural tube defects      The benefits and limitations of noninvasive screening were reviewed. Screening tests provide a risk assessment (chance) specific to the pregnancy for certain fetal chromosome abnormalities but cannot definitively diagnose or exclude a fetal chromosome abnormality. Follow-up genetic counseling and consideration of diagnostic testing is recommended with any abnormal screening result. Diagnostic testing during a pregnancy is more certain and can test for more conditions. However, the tests do have a risk of miscarriage that requires careful consideration. These tests can detect fetal chromosome abnormalities with greater than 99% certainty. Results can be compromised by maternal cell contamination or mosaicism and are limited by the resolution of current genetic testing technology. There is no screening or diagnostic test that detects all forms of birth defects or intellectual disability.     It was a pleasure to be involved with Teetee [facility]. Face-to-face time of the meeting was 30 minutes.    Yvette Alvarez MS, [facility]  Licensed Genetic Counselor  [facility]  Maternal Fetal Medicine  Ph:  [phone]  [email or web address]

## 2023-04-04 NOTE — NURSING NOTE
Patient presents to MFM for L2/GC/MFM cons. Positive fetal movement. Denies LOF, vaginal bleeding or cramping/contractions. SBAR given to MFM MD, see their note in Epic.    Zoila Lemus RN

## 2023-04-04 NOTE — TELEPHONE ENCOUNTER
----- Message -----   From: Rosa Frank DO   Sent: 4/4/2023  11:19 AM CDT   To: [facility]     Patient had elevated 1 hour GTT 3/20/2023 (along with history of prediabetes).  I do not see she is scheduled for a 3-hour follow-up glucose test.  This ideally is done within 1 week of an abnormal 1 hr GTT.  Please call and schedule her within the next couple days if possible.     THANK YOU!   Rosa Frank DO

## 2023-04-04 NOTE — PROGRESS NOTES
[facility] Clinic Visit    Teetee presents to [facility] clinic for ultrasound and recommendations. The following problems were addressed:    Maternal obesity  Prediabetes  Late prenatal care    Tests Reviewed: one hour GCT, hemoglobin A1c, outside ultrasound  Tests Ordered: follow up obstetric ultrasound, fetal echocardiogram  Unique Records reviewed: na    Impression:  1) Rudolph intrauterine pregnancy at 25w 6d gestational age.   2) None of the anomalies commonly detected by ultrasound were evident in the detailed fetal anatomic survey, however some anatomy was seen suboptimally as outlined above.   3) Growth parameters and estimated fetal weight were consistent with established dates.  4) The amniotic fluid volume appeared normal.  5) Normal fetal activity for gestational age.  6) On transabdominal imaging the cervix appears long and closed.    Plan:  Thank-you for referring your patient for a comprehensive ultrasound.  She has declined serum aneuploidy assessment in this pregnancy.    I discussed the findings on today's ultrasound with the patient. We discussed that given Teetee's late gestational age, prediabetes and suboptimal evaluation of the fetal heart, we will plan to send her to pediatric cardiology to reassess cardiac anatomy. We reviewed that due to diabetes and elevated BMI, serial ultrasounds to assess fetal growth are recommended as they are more accurate than evaluation of fundal height. Weekly  testing is recommended at 34 weeks. Teetee still needs to take her three hour GTT, and we also reviewed that if she requires insulin for treatment of hyperglycemia, that twice weekly testing would be recommended at 32 weeks. Delivery is recommended at 39 weeks due to maternal BMI > 40.    Follow-up is scheduled here in 4 weeks to reassess anatomy and growth. Fetal echo will be scheduled with pediatric cardiology in a non-urgent fashion.    Return to primary provider for continued prenatal care.    If you have  questions regarding today's evaluation or if we can be of further service, please contact the Maternal-Fetal Medicine Center.    **Fetal anomalies may be present but not detected**    Gisell Matt MD  Maternal Fetal Medicine    Total Time: 15 minutes spent on the date of the encounter doing chart review, history and exam, documentation and further activities as noted above.

## 2023-04-05 ENCOUNTER — MEDICAL CORRESPONDENCE (OUTPATIENT)
Dept: HEALTH INFORMATION MANAGEMENT | Facility: CLINIC | Age: 38
End: 2023-04-05
Payer: COMMERCIAL

## 2023-04-06 ENCOUNTER — LAB (OUTPATIENT)
Dept: LAB | Facility: CLINIC | Age: 38
End: 2023-04-06
Attending: FAMILY MEDICINE
Payer: COMMERCIAL

## 2023-04-06 DIAGNOSIS — R73.09 ELEVATED GLUCOSE TOLERANCE TEST: ICD-10-CM

## 2023-04-06 DIAGNOSIS — O24.419 GESTATIONAL DIABETES MELLITUS (GDM) IN SECOND TRIMESTER, GESTATIONAL DIABETES METHOD OF CONTROL UNSPECIFIED: ICD-10-CM

## 2023-04-06 DIAGNOSIS — O09.522 MULTIGRAVIDA OF ADVANCED MATERNAL AGE IN SECOND TRIMESTER: ICD-10-CM

## 2023-04-06 DIAGNOSIS — R73.03 PREDIABETES: ICD-10-CM

## 2023-04-06 DIAGNOSIS — E66.01 MORBID OBESITY (H): Primary | ICD-10-CM

## 2023-04-06 LAB
GESTATIONAL GTT 1 HR POST DOSE: 221 MG/DL (ref 60–179)
GESTATIONAL GTT 2 HR POST DOSE: 182 MG/DL (ref 60–154)
GESTATIONAL GTT 3 HR POST DOSE: 155 MG/DL (ref 60–139)
GLUCOSE P FAST SERPL-MCNC: 129 MG/DL (ref 60–94)

## 2023-04-06 PROCEDURE — 36415 COLL VENOUS BLD VENIPUNCTURE: CPT

## 2023-04-06 PROCEDURE — 82952 GTT-ADDED SAMPLES: CPT

## 2023-04-06 PROCEDURE — 82951 GLUCOSE TOLERANCE TEST (GTT): CPT

## 2023-04-13 ENCOUNTER — ALLIED HEALTH/NURSE VISIT (OUTPATIENT)
Dept: EDUCATION SERVICES | Facility: CLINIC | Age: 38
End: 2023-04-13
Attending: FAMILY MEDICINE
Payer: COMMERCIAL

## 2023-04-13 VITALS — BODY MASS INDEX: 45.45 KG/M2 | WEIGHT: 271 LBS

## 2023-04-13 DIAGNOSIS — R73.09 ELEVATED GLUCOSE TOLERANCE TEST: ICD-10-CM

## 2023-04-13 DIAGNOSIS — O09.522 SUPERVISION OF HIGH RISK ELDERLY MULTIGRAVIDA IN SECOND TRIMESTER: ICD-10-CM

## 2023-04-13 DIAGNOSIS — E66.01 MORBID OBESITY (H): ICD-10-CM

## 2023-04-13 DIAGNOSIS — R73.03 PREDIABETES: ICD-10-CM

## 2023-04-13 PROCEDURE — G0108 DIAB MANAGE TRN  PER INDIV: HCPCS

## 2023-04-13 RX ORDER — LANCETS
1 EACH MISCELLANEOUS 4 TIMES DAILY
Qty: 200 EACH | Refills: 3 | Status: ON HOLD | OUTPATIENT
Start: 2023-04-13 | End: 2023-06-30

## 2023-04-13 NOTE — LETTER
4/13/2023         RE: Teetee M Christiano  [address]  [place] [address]        Dear Colleague,    Thank you for referring your patient, Teetee Alvarado, to the [facility]. Please see a copy of my visit note below.    Diabetes Self-Management Education & Support    Type of Service: In Person Visit    SUBJECTIVE/OBJECTIVE:  Presents for education related to gestational diabetes.    Diabetes management related comments/concerns: need more test strips  Hospital planned for delivery: unknown  Number of previous pregnancies: 0  Had any babies over 9 lbs: No  Previously had Gestational Diabetes: No  Have you ever had thyroid problems or taken thyroid medication?: No  Heart disease, mitral valve prolapse or rheumatic fever?: No  Hypertension : No  High Cholesterol: No  High Triglycerides: No  Do you use tobacco products?: (!) Yes  Do you drink beer, wine or hard liquor?: No    Cultural Influences/Ethnic Background:  Not  or       Estimated Date of Delivery: Jul 12, 2023   JEM-Rosa Frank    Hemoglobin A1C   Date Value Ref Range Status   03/13/2023 5.9 (H) 0.0 - 5.6 % Final     Comment:     Normal <5.7%   Prediabetes 5.7-6.4%    Diabetes 6.5% or higher     Note: Adopted from ADA consensus guidelines.         1 hour OGTT  Lab Results   Component Value Date    GLU1 145 (H) 03/28/2023         3 hour OGTT    Fasting  Lab Results   Component Value Date    GTTGF 129 (H) 04/06/2023       1 hour  Lab Results   Component Value Date    GTTG1 221 (H) 04/06/2023       2 hour  Lab Results   Component Value Date    GTTG2 182 (H) 04/06/2023       3 hour  Lab Results   Component Value Date    GTTG3 155 (H) 04/06/2023       Lifestyle and Health Behaviors:  Pre-pregnancy weight (lbs): 277  Barrier to exercise: None  Cultural/Yazidism diet restrictions?: No  Meal planning/habits: Avoiding sweets, Low carb  How many times a week on average do you eat food made away from home  (restaurant/take-out)?: 4  Pre-josé vitamin?: Yes  Supplements?: No  Experiencing nausea?: No  Experiencing heartburn?: Yes    Healthy Coping:  Informal Support system:: Family, Friends, Neighbors, Partner    Current Management:       ASSESSMENT:  Teetee is here alone today for her initial education for gestational diabetes.  This is her first pregnancy.  She has a history of Pre-Diabetes and PCOS.  Stated she was doing well with her Diabetes and then found out she was pregnant.  Stated she found out about 6 weeks ago she was pregnant.  She has a meter at home, has not been checking her glucose.  Reviewed when to check glucose and glucose goals.    INTERVENTION:  Patient was instructed on Contour Next  meter and was able to provide an accurate return demonstration.     Educational topics covered today:  GDM diagnosis, pathophysiology, Risks and Complications of GDM, Means of controlling GDM, Using a Blood Glucose Monitor, Blood Glucose Goals, Logging and Interpreting Glucose Results, Ketone Testing, When to Call a Diabetes Educator or OB Provider, Healthy Eating During Pregnancy, Counting Carbohydrates, Meal Planning for GDM, and Physical Activity    Educational materials provided today:   Gloria Understanding Gestational Diabetes  GDM Log Book  Sharps Disposal  Care After Delivery  Contour Next EZ meter kit    Pt verbalized understanding of concepts discussed and recommendations provided today.     PLAN:  Check glucose 4 times daily, before breakfast and 1 hour after each meal.     Check Ketones daily for one week, if negative, reduce testing to once a week.     Physical activity recommended: Yes.    Meal plan: Teetee does not want to count carbs as she has a history of disordered eating and ADHD.  We discussed watching foods that are higher in carb and eating more vegetables and protein.    Call/e-mail/Lellanhart message diabetes educator if 3 or more blood sugars are above the goal in 1 week, if ketones are positive,  or with questions/concerns.     The service provided today was under the supervising provider, Key Sahu, who was available if needed.     Time Spent: 30 minutes  Encounter Type: Individual    Any diabetes medication dose changes were made via the CDE Protocol and Collaborative Practice Agreement with the patient's referring provider. A copy of this encounter was shared with the provider.

## 2023-04-18 NOTE — PROGRESS NOTES
Diabetes Self-Management Education & Support    Type of Service: In Person Visit    SUBJECTIVE/OBJECTIVE:  Presents for education related to gestational diabetes.    Diabetes management related comments/concerns: need more test strips  Hospital planned for delivery: unknown  Number of previous pregnancies: 0  Had any babies over 9 lbs: No  Previously had Gestational Diabetes: No  Have you ever had thyroid problems or taken thyroid medication?: No  Heart disease, mitral valve prolapse or rheumatic fever?: No  Hypertension : No  High Cholesterol: No  High Triglycerides: No  Do you use tobacco products?: (!) Yes  Do you drink beer, wine or hard liquor?: No    Cultural Influences/Ethnic Background:  Not  or       Estimated Date of Delivery: 2023   JEM-Rosa Frank    Hemoglobin A1C   Date Value Ref Range Status   2023 5.9 (H) 0.0 - 5.6 % Final     Comment:     Normal <5.7%   Prediabetes 5.7-6.4%    Diabetes 6.5% or higher     Note: Adopted from ADA consensus guidelines.         1 hour OGTT  Lab Results   Component Value Date    GLU1 145 (H) 2023         3 hour OGTT    Fasting  Lab Results   Component Value Date    GTTGF 129 (H) 2023       1 hour  Lab Results   Component Value Date    GTTG1 221 (H) 2023       2 hour  Lab Results   Component Value Date    GTTG2 182 (H) 2023       3 hour  Lab Results   Component Value Date    GTTG3 155 (H) 2023       Lifestyle and Health Behaviors:  Pre-pregnancy weight (lbs): 277  Barrier to exercise: None  Cultural/Islam diet restrictions?: No  Meal planning/habits: Avoiding sweets, Low carb  How many times a week on average do you eat food made away from home (restaurant/take-out)?: 4  Pre- vitamin?: Yes  Supplements?: No  Experiencing nausea?: No  Experiencing heartburn?: Yes    Healthy Coping:  Informal Support system:: Family, Friends, Neighbors, Partner    Current Management:       ASSESSMENT:  Teetee is here alone  today for her initial education for gestational diabetes.  This is her first pregnancy.  She has a history of Pre-Diabetes and PCOS.  Stated she was doing well with her Diabetes and then found out she was pregnant.  Stated she found out about 6 weeks ago she was pregnant.  She has a meter at home, has not been checking her glucose.  Reviewed when to check glucose and glucose goals.    INTERVENTION:  Patient was instructed on Contour Next  meter and was able to provide an accurate return demonstration.     Educational topics covered today:  GDM diagnosis, pathophysiology, Risks and Complications of GDM, Means of controlling GDM, Using a Blood Glucose Monitor, Blood Glucose Goals, Logging and Interpreting Glucose Results, Ketone Testing, When to Call a Diabetes Educator or OB Provider, Healthy Eating During Pregnancy, Counting Carbohydrates, Meal Planning for GDM, and Physical Activity    Educational materials provided today:   Gloria Understanding Gestational Diabetes  GDM Log Book  Sharps Disposal  Care After Delivery  Contour Next EZ meter kit    Pt verbalized understanding of concepts discussed and recommendations provided today.     PLAN:  Check glucose 4 times daily, before breakfast and 1 hour after each meal.     Check Ketones daily for one week, if negative, reduce testing to once a week.     Physical activity recommended: Yes.    Meal plan: Teetee does not want to count carbs as she has a history of disordered eating and ADHD.  We discussed watching foods that are higher in carb and eating more vegetables and protein.    Call/e-mail/MyChart message diabetes educator if 3 or more blood sugars are above the goal in 1 week, if ketones are positive, or with questions/concerns.     The service provided today was under the supervising provider, Key Sahu, who was available if needed.     Time Spent: 30 minutes  Encounter Type: Individual    Any diabetes medication dose changes were made via the CDE Protocol  and Collaborative Practice Agreement with the patient's referring provider. A copy of this encounter was shared with the provider.

## 2023-04-20 ENCOUNTER — PRENATAL OFFICE VISIT (OUTPATIENT)
Dept: FAMILY MEDICINE | Facility: CLINIC | Age: 38
End: 2023-04-20
Payer: COMMERCIAL

## 2023-04-20 VITALS
OXYGEN SATURATION: 100 % | BODY MASS INDEX: 45.82 KG/M2 | HEART RATE: 86 BPM | DIASTOLIC BLOOD PRESSURE: 70 MMHG | WEIGHT: 275 LBS | HEIGHT: 65 IN | SYSTOLIC BLOOD PRESSURE: 109 MMHG

## 2023-04-20 DIAGNOSIS — R73.03 PREDIABETES: ICD-10-CM

## 2023-04-20 DIAGNOSIS — F41.9 ANXIETY AND DEPRESSION: ICD-10-CM

## 2023-04-20 DIAGNOSIS — R76.8 HSV-2 SEROPOSITIVE: ICD-10-CM

## 2023-04-20 DIAGNOSIS — O09.513 HIGH-RISK PREGNANCY, PRIMIGRAVIDA OF ADVANCED MATERNAL AGE IN THIRD TRIMESTER: Primary | ICD-10-CM

## 2023-04-20 DIAGNOSIS — F32.A ANXIETY AND DEPRESSION: ICD-10-CM

## 2023-04-20 DIAGNOSIS — O24.410 DIET CONTROLLED GESTATIONAL DIABETES MELLITUS (GDM) IN THIRD TRIMESTER: ICD-10-CM

## 2023-04-20 DIAGNOSIS — E66.01 MORBID OBESITY (H): ICD-10-CM

## 2023-04-20 DIAGNOSIS — F90.0 ATTENTION DEFICIT HYPERACTIVITY DISORDER (ADHD), PREDOMINANTLY INATTENTIVE TYPE: ICD-10-CM

## 2023-04-20 LAB
ERYTHROCYTE [DISTWIDTH] IN BLOOD BY AUTOMATED COUNT: 12.9 % (ref 10–15)
HCT VFR BLD AUTO: 37.7 % (ref 35–47)
HGB BLD-MCNC: 13.1 G/DL (ref 11.7–15.7)
MCH RBC QN AUTO: 30.1 PG (ref 26.5–33)
MCHC RBC AUTO-ENTMCNC: 34.7 G/DL (ref 31.5–36.5)
MCV RBC AUTO: 87 FL (ref 78–100)
PLATELET # BLD AUTO: 284 10E3/UL (ref 150–450)
RBC # BLD AUTO: 4.35 10E6/UL (ref 3.8–5.2)
WBC # BLD AUTO: 11.8 10E3/UL (ref 4–11)

## 2023-04-20 PROCEDURE — 85027 COMPLETE CBC AUTOMATED: CPT | Performed by: FAMILY MEDICINE

## 2023-04-20 PROCEDURE — 90471 IMMUNIZATION ADMIN: CPT | Performed by: FAMILY MEDICINE

## 2023-04-20 PROCEDURE — 86780 TREPONEMA PALLIDUM: CPT | Performed by: FAMILY MEDICINE

## 2023-04-20 PROCEDURE — 36415 COLL VENOUS BLD VENIPUNCTURE: CPT | Performed by: FAMILY MEDICINE

## 2023-04-20 PROCEDURE — 99207 PR COMPLICATED OB VISIT: CPT | Performed by: FAMILY MEDICINE

## 2023-04-20 PROCEDURE — 90715 TDAP VACCINE 7 YRS/> IM: CPT | Performed by: FAMILY MEDICINE

## 2023-04-20 RX ORDER — ASPIRIN 81 MG/1
81 TABLET ORAL DAILY
Status: ON HOLD | COMMUNITY
End: 2023-06-30

## 2023-04-20 NOTE — PROGRESS NOTES
"Teetee Alvarado is a 38 year old  female who presents to clinic for a follow up OB visit. She is currently 28w1d with an estimated date of delivery of 2023 via 22-week ultrasound. She denies headache, chest pain, shortness of breath, abdominal pain/contractions, vaginal bleeding, or abnormal discharge. She continues to feel baby move.     New concerns today: Difficult for her to remember to check blood sugars due to ADHD.  Fasting blood sugars have been elevated in the 140s.  Has been hard for her to check postprandials.    OB History    Para Term  AB Living   1 0 0 0 0 0   SAB IAB Ectopic Multiple Live Births   0 0 0 0 0      # Outcome Date GA Lbr Jose F/2nd Weight Sex Delivery Anes PTL Lv   1 Current                Physical exam:  /70   Pulse 86   Ht 1.645 m (5' 4.75\")   Wt 124.7 kg (275 lb)   LMP  (LMP Unknown)   SpO2 100%   BMI 46.12 kg/m      General: alert, female in no acute distress  Abdomen: gravid uterus appropriate for gestation age at 29 cm above pubic symphysis, non-tender, FHTs 135  Extremities: no edema    Prenatal labs  Blood type: A+  Hgb: 13.6  Pap: Will need postpartum  Hep B, HIV, syphilis, gonorrhea, chlamydia, HIV negative  Rubella immune  UA/Ucx normal    Impression:  1) Rudolph intrauterine pregnancy at 25w 6d gestational age.   2) None of the anomalies commonly detected by ultrasound were evident in the detailed fetal anatomic survey, however some anatomy was seen suboptimally as outlined above.   3) Growth parameters and estimated fetal weight were consistent with established dates.  4) The amniotic fluid volume appeared normal.  5) Normal fetal activity for gestational age.  6) On transabdominal imaging the cervix appears long and closed.     Assessment/Plan:  High-risk pregnancy, primigravida of advanced maternal age in third trimester  Update CBC and syphilis today. Blood type A+. Rhogam not indicated. TDAP today.  Scheduled with pediatric cardiology to " reassess cardiac anatomy.  Per MFM, due to diabetes and elevated BMI will need serial ultrasounds to assess fetal growth.  Weekly  testing recommended at 34 weeks.  If she requires insulin for treatment of hypoglycemia, will need twice-weekly testing beginning at 32 weeks with delivery at 39 weeks due to maternal BMI greater than 40.  - CBC with platelets; Future  - Treponema Abs w Reflex to RPR and Titer; Future    Morbid obesity (H)  Body mass index is 46.12 kg/m . Recommended weight gain throughout pregnancy 11 to 20 pounds.     Diet controlled gestational diabetes mellitus (GDM) in third trimester  Prediabetes  Elevated fasting blood sugars in the 140s, meeting with diabetic education tomorrow and will need to start insulin.     Goals            Fasting <95           1 hr postprandial <140           2 hr postprandial <120           Ketones <40    HSV-2 seropositive  We will need acyclovir prophylaxis at 36 weeks.    Attention deficit hyperactivity disorder (ADHD), predominantly inattentive type  Difficult for patient to remember to check blood sugars.  No longer on stimulant medication due to pregnancy.    Anxiety and depression  Stable off sertraline, monitor for labile mood and postpartum depression.      Follow up in 2 weeks for routine prenatal visit.     Rosa Frank DO

## 2023-04-20 NOTE — PROGRESS NOTES
Prior to immunization administration, verified patients identity using patient s name and date of birth. Please see Immunization Activity for additional information.     Screening Questionnaire for Adult Immunization    Are you sick today?   No   Do you have allergies to medications, food, a vaccine component or latex?   No   Have you ever had a serious reaction after receiving a vaccination?   No   Do you have a long-term health problem with heart, lung, kidney, or metabolic disease (e.g., diabetes), asthma, a blood disorder, no spleen, complement component deficiency, a cochlear implant, or a spinal fluid leak?  Are you on long-term aspirin therapy?   Yes   Do you have cancer, leukemia, HIV/AIDS, or any other immune system problem?   No   Do you have a parent, brother, or sister with an immune system problem?   No   In the past 3 months, have you taken medications that affect  your immune system, such as prednisone, other steroids, or anticancer drugs; drugs for the treatment of rheumatoid arthritis, Crohn s disease, or psoriasis; or have you had radiation treatments?   No   Have you had a seizure, or a brain or other nervous system problem?   No   During the past year, have you received a transfusion of blood or blood    products, or been given immune (gamma) globulin or antiviral drug?   No   For women: Are you pregnant or is there a chance you could become       pregnant during the next month?   Yes   Have you received any vaccinations in the past 4 weeks?   No     Immunization questionnaire was positive for at least one answer.  Notified Dr. Frank.      Injection of tdap given by Gisell Buck CMA. Patient instructed to remain in clinic for 15 minutes afterwards, and to report any adverse reactions.     Screening performed by Gisell Buck CMA on 4/20/2023 at 2:32 PM.

## 2023-04-20 NOTE — PATIENT INSTRUCTIONS
[facility] Information  If you have any further concerns or wish to schedule another appointment, please call our office at [phone].  Call your doctor if you experience any bleeding or abdominal cramping early in pregnancy.     For uncomplicated pregnancies, you will be seeing your doctor once a month until you are 28 weeks, then you will see your doctor twice a month. You will begin weekly visits at 36 weeks until you deliver.     If you have a medical emergency, please call [phone].    [facility] Labor and Delivery: [phone]  [facility] Labor and Delivery: [phone]    When to call or come in to the hospital  If you notice a decrease in your baby's movement.   If your begin to experience contractions that are 5 minutes or less apart and lasting for over 45 seconds, or if contractions are becoming more painful.  If you have any bleeding or leakage of fluids.   If you have a headache not resolved with tylenol, right upper abdominal pain, or sudden onset of swelling.  You know your body best. Never hesitate to call or go to the hospital if something doesn't feel right!  After-baby Birth Control Methods   It is important to consider contraception after your baby is born if you would like to prevent a pregnancy in the near future. If you are breast feeding, talk with your doctor to determine the best method for you. It is recommended that you wait 12 months after the birth of your baby to get pregnant again.   Condoms   Latex condoms can prevent pregnancy and STDs. Condoms work best when used with spermicide that is placed inside the vagina as well as inside the condom. Use only water-based lubricants. Petroleum based products (such as Vaseline and many massage oils) can weaken the latex and cause it to break.   Iud   IUD's are made of flexible plastic and must be inserted into your uterus by a doctor. The IUD works by stopping the fertilized egg from attaching itself to the uterus. IUDs may increase  the risk of getting a pelvic infection (PID), which can lead to infertility if not diagnosed and treated early. This is a great option after delivery of your baby! These last for 3, 5, or 10 years depending up on which type you choose, but can be removed earlier if you decide you would like to get pregnant sooner.  Tubal Ligation & Vasectomy   These are good choices for women and men who know that they do not want to have any more children.     HORMONES   Birth control pills, hormone implants and shots work by stopping ovulation (release of the egg from the ovary). Implants are placed in the upper arm by a minor surgical incision. They last for five years, but can be removed by your doctor if you decide to get pregnant. Hormone injections must be repeated every three months. The Pill must be taken every day.   All hormones can have side effects and create certain health risks. They are very effective in preventing pregnancy but they do not prevent STDs. You can talk more about the risks and benefits with your doctor.

## 2023-04-21 ENCOUNTER — ALLIED HEALTH/NURSE VISIT (OUTPATIENT)
Dept: EDUCATION SERVICES | Facility: CLINIC | Age: 38
End: 2023-04-21
Payer: COMMERCIAL

## 2023-04-21 ENCOUNTER — TELEPHONE (OUTPATIENT)
Dept: EDUCATION SERVICES | Facility: CLINIC | Age: 38
End: 2023-04-21

## 2023-04-21 VITALS — BODY MASS INDEX: 45.95 KG/M2 | WEIGHT: 274 LBS

## 2023-04-21 DIAGNOSIS — O24.813 OTHER PRE-EXISTING DIABETES MELLITUS DURING PREGNANCY IN THIRD TRIMESTER: Primary | ICD-10-CM

## 2023-04-21 LAB — T PALLIDUM AB SER QL: NONREACTIVE

## 2023-04-21 PROCEDURE — G0108 DIAB MANAGE TRN  PER INDIV: HCPCS

## 2023-04-21 RX ORDER — BLOOD-GLUCOSE SENSOR
1 EACH MISCELLANEOUS
Qty: 2 EACH | Refills: 11 | Status: SHIPPED | OUTPATIENT
Start: 2023-04-21 | End: 2023-05-09

## 2023-04-21 NOTE — LETTER
4/21/2023         RE: Teetee Alvarado  [address]  [place] [address]        Dear Colleague,    Thank you for referring your patient, Teetee Alvarado, to the [facility]. Please see a copy of my visit note below.    Diabetes Self-Management Education & Support  Type of Service: In Person Visit    SUBJECTIVE/OBJECTIVE:  Presents for education related to gestational diabetes.         Cultural Influences/Ethnic Background:  Not  or       Wt 124.3 kg (274 lb)   LMP  (LMP Unknown)   BMI 45.95 kg/m          Estimated Date of Delivery: Jul 12, 2023   OB-Rosaarun Frank    Teetee has been having trouble remembering to check her glucose.  Her readings are noted below:  4.  4./139  4.  4./105  4.  She picked up her ketone test strips two days ago and has not started checking yet      Lifestyle and Health Behaviors:       Healthy Coping:       Current Management:       ASSESSMENT:  Teetee is here alone today for her follow up on her Pre-Diabetes during pregnancy.  Stated she is feeling well.  She is having trouble remembering to check her glucose regularly.  Stated she is doing the best she can.  We dicussed the idea of a CGM and she would like this.  Given that she has trouble remembering to check her glucose we discussed a Robert 3 as this does not require scanning.    Discussed starting insulin and she is ready today.  Reviewed administration technique, site selection, site rotation, dosing, timing of insulin, storage, sharps disposal and hypoglycemia signs and treatment. Per Key Sahu, will start Levemir 20 units daily.  Discussed getting scheduled with endocrinology.    INTERVENTION:  Educational topics covered today:  What to expect after delivery, Future testing for Type 2 diabetes (2 hour OGTT at 6 week post-partum check-up and annual fasting blood glucose level), Risk of GDM and planning ahead for future pregnancies, Recommended  lifestyle interventions for reducing the risk of Type 2 Diabetes, When to Call a Diabetes Educator or OB Provider    Educational Materials provided today:  Gloria Preventing Diabetes    PLAN:  Check glucose 4 times daily.  Check ketones once a week when readings are consistently negative.  Continue with recommended physical activity.  Continue to follow recommended meal plan: 15-30 carbs at breakfast, 45-60 carbs at lunch, 45-60 carbs at supper, 15-30 carbs at snacks.  Follow consistent CHO meal plan, eat CHO and protein/fat at all meals/snacks.    Call/e-mail/MyChart message diabetes educator if 3 or more blood sugars are above the goal in 1 week or if ketones are positive.     The service provided today was under the supervising provider, Key Sahu, who was available if needed.     Time Spent: 30 minutes  Encounter Type: Individual    Any diabetes medication dose changes were made via the CDE Protocol and Collaborative Practice Agreement with the patient's referring provider. A copy of this encounter was shared with the provider.

## 2023-04-21 NOTE — PROGRESS NOTES
Diabetes Self-Management Education & Support  Type of Service: In Person Visit    SUBJECTIVE/OBJECTIVE:  Presents for education related to gestational diabetes.         Cultural Influences/Ethnic Background:  Not  or       Wt 124.3 kg (274 lb)   LMP  (LMP Unknown)   BMI 45.95 kg/m          Estimated Date of Delivery: Jul 12, 2023   OB-Rosa Frank    Teetee has been having trouble remembering to check her glucose.  Her readings are noted below:  4.  4./139  4.  4./105  4.  She picked up her ketone test strips two days ago and has not started checking yet      Lifestyle and Health Behaviors:       Healthy Coping:       Current Management:       ASSESSMENT:  Teetee is here alone today for her follow up on her Pre-Diabetes during pregnancy.  Stated she is feeling well.  She is having trouble remembering to check her glucose regularly.  Stated she is doing the best she can.  We dicussed the idea of a CGM and she would like this.  Given that she has trouble remembering to check her glucose we discussed a Robert 3 as this does not require scanning.    Discussed starting insulin and she is ready today.  Reviewed administration technique, site selection, site rotation, dosing, timing of insulin, storage, sharps disposal and hypoglycemia signs and treatment. Per Key Sahu, will start Levemir 20 units daily.  Discussed getting scheduled with endocrinology.    INTERVENTION:  Educational topics covered today:  What to expect after delivery, Future testing for Type 2 diabetes (2 hour OGTT at 6 week post-partum check-up and annual fasting blood glucose level), Risk of GDM and planning ahead for future pregnancies, Recommended lifestyle interventions for reducing the risk of Type 2 Diabetes, When to Call a Diabetes Educator or OB Provider    Educational Materials provided today:  Gloria Preventing Diabetes    PLAN:  Check glucose 4 times daily.  Check ketones once a week when  readings are consistently negative.  Continue with recommended physical activity.  Continue to follow recommended meal plan: 15-30 carbs at breakfast, 45-60 carbs at lunch, 45-60 carbs at supper, 15-30 carbs at snacks.  Follow consistent CHO meal plan, eat CHO and protein/fat at all meals/snacks.    Call/e-mail/MyChart message diabetes educator if 3 or more blood sugars are above the goal in 1 week or if ketones are positive.     The service provided today was under the supervising provider, Key Sahu, who was available if needed.     Time Spent: 30 minutes  Encounter Type: Individual    Any diabetes medication dose changes were made via the CDE Protocol and Collaborative Practice Agreement with the patient's referring provider. A copy of this encounter was shared with the provider.

## 2023-04-27 ENCOUNTER — OFFICE VISIT (OUTPATIENT)
Dept: ENDOCRINOLOGY | Facility: CLINIC | Age: 38
End: 2023-04-27
Payer: COMMERCIAL

## 2023-04-27 ENCOUNTER — APPOINTMENT (OUTPATIENT)
Dept: EDUCATION SERVICES | Facility: CLINIC | Age: 38
End: 2023-04-27
Payer: COMMERCIAL

## 2023-04-27 VITALS
BODY MASS INDEX: 46.45 KG/M2 | WEIGHT: 277 LBS | SYSTOLIC BLOOD PRESSURE: 100 MMHG | DIASTOLIC BLOOD PRESSURE: 70 MMHG | HEART RATE: 92 BPM | OXYGEN SATURATION: 94 %

## 2023-04-27 DIAGNOSIS — O24.414 INSULIN CONTROLLED GESTATIONAL DIABETES MELLITUS (GDM) IN SECOND TRIMESTER: Primary | ICD-10-CM

## 2023-04-27 PROCEDURE — 99204 OFFICE O/P NEW MOD 45 MIN: CPT | Performed by: NURSE PRACTITIONER

## 2023-04-27 PROCEDURE — 95251 CONT GLUC MNTR ANALYSIS I&R: CPT | Performed by: NURSE PRACTITIONER

## 2023-04-27 NOTE — PROGRESS NOTES
"  [facility]  ENDOCRINOLOGY    Gestational Diabetes 2023    Teetee Alvarado, 1985, [id number]          Reason for visit      1. Insulin controlled gestational diabetes mellitus (GDM) in second trimester        HPI     Teetee Alvarado is a very pleasant 38 year old old female who presents for GESTATIONAL Diabetes Mellitus.  She is currently 29w2d  pregnant . Due date is 23.  Diagnosed with GDM based on an OGTT. She hasnot had  GDM in prior pregnancies.   Current carbohydrate intake:consistent with recommendations of 30g-60g-60g.  I have reviewed her blood glucose logs and note that the:  Fasting readings  are:in range on current regimen  Postprandial readings are:in range on current regimen  Current Levemir dose: 20  Current Prandial insulin: 0  Blood glucose logs/meter brought in and data reviewed and incorporated into decision-making.  Planned delivery at:   OBN: Zac    Therapy/Interventions in the past:  She has been seen by the Diabetes Educator- and has received instruction on carbohydrate counting and  consistency.  Records from referring provider and other sources have also been reviewed and incorporated into decision-making.      TODAY:    Teetee is here for the first time after starting insulin for GDM. She would be considered to be \"pre-diabetes\".  She has been working with LISA Hein, who also joins us today. Teetee is now up to 20 units at . She is using the Robert 3 and we were able to look at her download today. She is passionate about her food. She reports that she \"found some Girl  Cookies\". That is evident on her download. We will give her some prandial insulin to take \"if she is eating something yummy\", she will take 6 units of Novolog. She will continue at her current dose with the Levemir. Baby is moving appropriately and she is having no swelling.     Past Medical History       Patient Active Problem List   Diagnosis     Attention deficit hyperactivity disorder (ADHD), " predominantly inattentive type     PCOS (polycystic ovarian syndrome)     Anxiety and depression     Anal fistula     Gastroesophageal reflux disease     HSV-2 seropositive     Morbid obesity (H)     Prediabetes     Multigravida of advanced maternal age in second trimester     Elevated glucose tolerance test     Insulin controlled gestational diabetes mellitus (GDM) in second trimester     High-risk pregnancy, primigravida of advanced maternal age in third trimester        Past Surgical History     Past Surgical History:   Procedure Laterality Date     ABSCESS DRAINAGE Left     left leg     LASIK       WISDOM TOOTH EXTRACTION         Family History     Family History   Problem Relation Age of Onset     Diabetes Type 2  Father      Arthritis Father        Social History     Social History     Tobacco Use     Smoking status: Every Day     Types: Vaping Device     Smokeless tobacco: Never     Tobacco comments:     vaping-daily   Substance Use Topics     Alcohol use: Not Currently     Drug use: Yes     Frequency: 7.0 times per week     Types: Marijuana     Comment: Drug use: daily       Review of Systems     Patient has no polyuria or polydipsia, no chest pain, dyspnea or TIA's, no numbness, tingling or pain in extremities  Remainder negative except as noted in HPI.      Vital Signs     /70 (BP Location: Left arm, Patient Position: Sitting, Cuff Size: Adult Large)   Pulse 92   Wt 125.6 kg (277 lb)   LMP  (LMP Unknown)   SpO2 94%   BMI 46.45 kg/m    Wt Readings from Last 3 Encounters:   04/27/23 125.6 kg (277 lb)   04/21/23 124.3 kg (274 lb)   04/20/23 124.7 kg (275 lb)       Physical Exam     GENERAL: Pleasant, alert, appropriate appearance for age. No acute distress,   HEENT: Normocephalic, atraumatic  NECK: normal in appearance  CHEST/RESPIRATORY: Normal chest wall and respirations.   ABDOMEN: Gravid   SKIN: No melanosis,  ecchymosis,  vitiligo. No acanthosis nigricans  NEURO:  Non-focal, no tremor.  PSYCH:  "Alert and oriented -appropriate affect. Orientation, judgement and memory appear intact.  MSK: No joint abnormalities, FROM in all four extremities. No kyphosis    Assessment     1. Insulin controlled gestational diabetes mellitus (GDM) in second trimester        Plan     1. GESTATIONAL DIABETES-  Adjust dose as follows:    -Levemir [id number]   units. Increase by 2 units every 2 days to keep fasting blood glucose below 95mg/dL  -Novolog 0  units with breakfast  -Novolog 0 units with lunch   -Novolog 0 units with dinner    PRN 6 units with \"something yummy\"    -Increase by 2 units every 2 days to keep 1 hour after meal blood glucose less than 140mg/dL    We reviewed glucose goals of fasting blood glucose <95 mg/dL and 1 hour post prandial blood glucose of <140 mg/dL.    Monitor blood sugar 4 times daily: Fasting  and 1 hour after each meal.  Contact  this clinic [phone] if blood glucose is not within the above-mentioned goals.     We discussed the importance of excellent glycemic control during pregnancy to limit complications such as fetal macrosomia, shoulder dystocia,  hypoglycemia and hyperbilirubinemia.  I have discussed the patient's increased risk of recurrent GDM and/or development of type 2 diabetes later in life.      F/u next week.        Key Sahu NP  2023      Lab Results     Microalbumin Urine mg/dL   Date Value Ref Range Status   2021 1.03 0.00 - 1.99 mg/dL Final       Cholesterol   Date Value Ref Range Status   2022 155 <200 mg/dL Final     Direct Measure HDL   Date Value Ref Range Status   2022 47 (L) >=50 mg/dL Final     Triglycerides   Date Value Ref Range Status   2022 99 <150 mg/dL Final             Current Medications             Robert    "

## 2023-04-27 NOTE — LETTER
"    2023         RE: Teetee Alvarado  [address]  [place] [address]        Dear Colleague,    Thank you for referring your patient, Teetee Alvarado, to the [facility]. Please see a copy of my visit note below.      [facility]  ENDOCRINOLOGY    Gestational Diabetes 2023    Teetee Alvarado, 1985, [id number]          Reason for visit      1. Insulin controlled gestational diabetes mellitus (GDM) in second trimester        HPI     Teetee Alvarado is a very pleasant 38 year old old female who presents for GESTATIONAL Diabetes Mellitus.  She is currently 29w2d  pregnant . Due date is 23.  Diagnosed with GDM based on an OGTT. She hasnot had  GDM in prior pregnancies.   Current carbohydrate intake:consistent with recommendations of 30g-60g-60g.  I have reviewed her blood glucose logs and note that the:  Fasting readings  are:in range on current regimen  Postprandial readings are:in range on current regimen  Current Levemir dose: 20  Current Prandial insulin: 0  Blood glucose logs/meter brought in and data reviewed and incorporated into decision-making.  Planned delivery at:   OBN: Zac    Therapy/Interventions in the past:  She has been seen by the Diabetes Educator- and has received instruction on carbohydrate counting and  consistency.  Records from referring provider and other sources have also been reviewed and incorporated into decision-making.      TODAY:    Teetee is here for the first time after starting insulin for GDM. She would be considered to be \"pre-diabetes\".  She has been working with LISA Hein, who also joins us today. Teetee is now up to 20 units at . She is using the Robert 3 and we were able to look at her download today. She is passionate about her food. She reports that she \"found some Girl  Cookies\". That is evident on her download. We will give her some prandial insulin to take \"if she is eating something yummy\", she will " take 6 units of Novolog. She will continue at her current dose with the Levemir. Baby is moving appropriately and she is having no swelling.     Past Medical History       Patient Active Problem List   Diagnosis     Attention deficit hyperactivity disorder (ADHD), predominantly inattentive type     PCOS (polycystic ovarian syndrome)     Anxiety and depression     Anal fistula     Gastroesophageal reflux disease     HSV-2 seropositive     Morbid obesity (H)     Prediabetes     Multigravida of advanced maternal age in second trimester     Elevated glucose tolerance test     Insulin controlled gestational diabetes mellitus (GDM) in second trimester     High-risk pregnancy, primigravida of advanced maternal age in third trimester        Past Surgical History     Past Surgical History:   Procedure Laterality Date     ABSCESS DRAINAGE Left     left leg     LASIK       WISDOM TOOTH EXTRACTION         Family History     Family History   Problem Relation Age of Onset     Diabetes Type 2  Father      Arthritis Father        Social History     Social History     Tobacco Use     Smoking status: Every Day     Types: Vaping Device     Smokeless tobacco: Never     Tobacco comments:     vaping-daily   Substance Use Topics     Alcohol use: Not Currently     Drug use: Yes     Frequency: 7.0 times per week     Types: Marijuana     Comment: Drug use: daily       Review of Systems     Patient has no polyuria or polydipsia, no chest pain, dyspnea or TIA's, no numbness, tingling or pain in extremities  Remainder negative except as noted in HPI.      Vital Signs     /70 (BP Location: Left arm, Patient Position: Sitting, Cuff Size: Adult Large)   Pulse 92   Wt 125.6 kg (277 lb)   LMP  (LMP Unknown)   SpO2 94%   BMI 46.45 kg/m    Wt Readings from Last 3 Encounters:   04/27/23 125.6 kg (277 lb)   04/21/23 124.3 kg (274 lb)   04/20/23 124.7 kg (275 lb)       Physical Exam     GENERAL: Pleasant, alert, appropriate appearance for age.  "No acute distress,   HEENT: Normocephalic, atraumatic  NECK: normal in appearance  CHEST/RESPIRATORY: Normal chest wall and respirations.   ABDOMEN: Gravid   SKIN: No melanosis,  ecchymosis,  vitiligo. No acanthosis nigricans  NEURO:  Non-focal, no tremor.  PSYCH: Alert and oriented -appropriate affect. Orientation, judgement and memory appear intact.  MSK: No joint abnormalities, FROM in all four extremities. No kyphosis    Assessment     1. Insulin controlled gestational diabetes mellitus (GDM) in second trimester        Plan     1. GESTATIONAL DIABETES-  Adjust dose as follows:    -Levemir [id number]   units. Increase by 2 units every 2 days to keep fasting blood glucose below 95mg/dL  -Novolog 0  units with breakfast  -Novolog 0 units with lunch   -Novolog 0 units with dinner    PRN 6 units with \"something yummy\"    -Increase by 2 units every 2 days to keep 1 hour after meal blood glucose less than 140mg/dL    We reviewed glucose goals of fasting blood glucose <95 mg/dL and 1 hour post prandial blood glucose of <140 mg/dL.    Monitor blood sugar 4 times daily: Fasting  and 1 hour after each meal.  Contact  this clinic [phone] if blood glucose is not within the above-mentioned goals.     We discussed the importance of excellent glycemic control during pregnancy to limit complications such as fetal macrosomia, shoulder dystocia,  hypoglycemia and hyperbilirubinemia.  I have discussed the patient's increased risk of recurrent GDM and/or development of type 2 diabetes later in life.      F/u next week.        Key Sahu NP  2023      Lab Results     Microalbumin Urine mg/dL   Date Value Ref Range Status   2021 1.03 0.00 - 1.99 mg/dL Final       Cholesterol   Date Value Ref Range Status   2022 155 <200 mg/dL Final     Direct Measure HDL   Date Value Ref Range Status   2022 47 (L) >=50 mg/dL Final     Triglycerides   Date Value Ref Range Status   2022 99 <150 mg/dL Final "             Current Medications             Robert        Again, thank you for allowing me to participate in the care of your patient.        Sincerely,        Key Sahu NP

## 2023-04-28 PROBLEM — O24.414 INSULIN CONTROLLED GESTATIONAL DIABETES MELLITUS (GDM) IN SECOND TRIMESTER: Status: ACTIVE | Noted: 2023-04-20

## 2023-05-04 ENCOUNTER — TELEPHONE (OUTPATIENT)
Dept: CARDIOLOGY | Facility: CLINIC | Age: 38
End: 2023-05-04

## 2023-05-04 ENCOUNTER — HOSPITAL ENCOUNTER (OUTPATIENT)
Dept: ULTRASOUND IMAGING | Facility: CLINIC | Age: 38
Discharge: HOME | End: 2023-05-04
Attending: OBSTETRICS & GYNECOLOGY
Payer: COMMERCIAL

## 2023-05-04 ENCOUNTER — OFFICE VISIT (OUTPATIENT)
Dept: MATERNAL FETAL MEDICINE | Facility: CLINIC | Age: 38
End: 2023-05-04
Attending: OBSTETRICS & GYNECOLOGY
Payer: COMMERCIAL

## 2023-05-04 DIAGNOSIS — O09.522 MULTIGRAVIDA OF ADVANCED MATERNAL AGE IN SECOND TRIMESTER: ICD-10-CM

## 2023-05-04 DIAGNOSIS — O99.212 MATERNAL OBESITY, ANTEPARTUM, SECOND TRIMESTER: ICD-10-CM

## 2023-05-04 DIAGNOSIS — O24.113 TYPE 2 DIABETES MELLITUS COMPLICATING PREGNANCY IN THIRD TRIMESTER, ANTEPARTUM: Primary | ICD-10-CM

## 2023-05-04 DIAGNOSIS — R73.09 ELEVATED GLUCOSE TOLERANCE TEST: ICD-10-CM

## 2023-05-04 DIAGNOSIS — R73.03 PREDIABETES: ICD-10-CM

## 2023-05-04 DIAGNOSIS — O99.213 OBESITY AFFECTING PREGNANCY IN THIRD TRIMESTER: ICD-10-CM

## 2023-05-04 PROCEDURE — 76816 OB US FOLLOW-UP PER FETUS: CPT | Mod: 26 | Performed by: OBSTETRICS & GYNECOLOGY

## 2023-05-04 PROCEDURE — 76816 OB US FOLLOW-UP PER FETUS: CPT

## 2023-05-04 NOTE — NURSING NOTE
Patient reports good fetal movement, denies contractions, leaking of fluid, or bleeding.  Reports blood sugar values fasting in 120-130's and elevated hr post prandial values, has been working with diabetic ed to manage insulin dosages. SBAR given to SHUN URENA, see their note in Epic.

## 2023-05-04 NOTE — PROGRESS NOTES
"Please see \"Imaging\" tab under \"Chart Review\" for details of today's US at the [facility].    Jose G Ball MD  Maternal-Fetal Medicine      "

## 2023-05-05 ENCOUNTER — TELEPHONE (OUTPATIENT)
Dept: CARDIOLOGY | Facility: CLINIC | Age: 38
End: 2023-05-05
Payer: COMMERCIAL

## 2023-05-09 ENCOUNTER — OFFICE VISIT (OUTPATIENT)
Dept: CARDIOLOGY | Facility: CLINIC | Age: 38
End: 2023-05-09
Payer: COMMERCIAL

## 2023-05-09 ENCOUNTER — HOSPITAL ENCOUNTER (OUTPATIENT)
Dept: CARDIOLOGY | Facility: CLINIC | Age: 38
Discharge: HOME | End: 2023-05-09
Attending: OBSTETRICS & GYNECOLOGY | Admitting: OBSTETRICS & GYNECOLOGY
Payer: COMMERCIAL

## 2023-05-09 DIAGNOSIS — O09.522 MULTIGRAVIDA OF ADVANCED MATERNAL AGE IN SECOND TRIMESTER: ICD-10-CM

## 2023-05-09 DIAGNOSIS — R73.03 PREDIABETES: ICD-10-CM

## 2023-05-09 DIAGNOSIS — O99.212 MATERNAL OBESITY, ANTEPARTUM, SECOND TRIMESTER: ICD-10-CM

## 2023-05-09 DIAGNOSIS — R73.09 ELEVATED GLUCOSE TOLERANCE TEST: ICD-10-CM

## 2023-05-09 DIAGNOSIS — O24.414 INSULIN CONTROLLED GESTATIONAL DIABETES MELLITUS (GDM) IN SECOND TRIMESTER: Primary | ICD-10-CM

## 2023-05-09 PROCEDURE — 99204 OFFICE O/P NEW MOD 45 MIN: CPT | Mod: 25 | Performed by: PEDIATRICS

## 2023-05-09 PROCEDURE — 76827 ECHO EXAM OF FETAL HEART: CPT | Mod: 26 | Performed by: PEDIATRICS

## 2023-05-09 PROCEDURE — 93325 DOPPLER ECHO COLOR FLOW MAPG: CPT

## 2023-05-09 PROCEDURE — 93325 DOPPLER ECHO COLOR FLOW MAPG: CPT | Mod: 26 | Performed by: PEDIATRICS

## 2023-05-09 PROCEDURE — 76825 ECHO EXAM OF FETAL HEART: CPT | Mod: 26 | Performed by: PEDIATRICS

## 2023-05-09 PROCEDURE — 76825 ECHO EXAM OF FETAL HEART: CPT

## 2023-05-09 NOTE — PROGRESS NOTES
Fetal Cardiology Consultation    Patient:  Teetee Alvarado MRN:  [id number]   YOB: 1985 Age:  38 year old   Date of Visit:  5/9/2023 PCP:  Rosa Frank DO   DON: 7/12/2023, by Ultrasound EGA: 30w6d weeks     Dear Dr. Matt:    I had the pleasure of seeing Teetee Alvarado at the [facility] in [place] on 5/9/2023 in consultation for fetal echocardiography results. She presented today accompanied by her partner. As you know, she is a 38 year old female with T2DM.    The fetal echocardiogram was technically very challenging. Likely normal fetal echocardiogram. Normal fetal cardiac anatomy. Normal right and left ventricular size and function without hypertrophy. No evidence of diastolic dysfunction. No pericardial effusion. No arrhythmia.     I reviewed and interpreted the fetal echocardiogram today. I discussed the likely normal results with Ms. Alvarado and her partner. While these results are normal, it is important to note that fetal echocardiography cannot exclude small atrial or ventricular septal defects, persistent ductus arteriosus, mild coarctation of the aorta, partial anomalous pulmonary venous return, minor anatomic valve anomalies, or coronary artery anomalies.     Thank you for allowing me to participate in Ms. Alvarado's care. Please don't hesitate to contact me or the Fetal Cardiology team at [facility] with any questions or concerns.     I spent a total of 45 minutes on the date of the encounter doing chart review, patient history, documentation, counseling, and coordinating care.    Milan Morgan MD  Pediatric Cardiology  [facility]  Phone [phone]    Review of the result(s) of each unique test - fetal echocardiogram

## 2023-05-11 ENCOUNTER — ALLIED HEALTH/NURSE VISIT (OUTPATIENT)
Dept: EDUCATION SERVICES | Facility: CLINIC | Age: 38
End: 2023-05-11
Payer: COMMERCIAL

## 2023-05-11 ENCOUNTER — OFFICE VISIT (OUTPATIENT)
Dept: ENDOCRINOLOGY | Facility: CLINIC | Age: 38
End: 2023-05-11
Payer: COMMERCIAL

## 2023-05-11 ENCOUNTER — PRENATAL OFFICE VISIT (OUTPATIENT)
Dept: FAMILY MEDICINE | Facility: CLINIC | Age: 38
End: 2023-05-11
Payer: COMMERCIAL

## 2023-05-11 VITALS
DIASTOLIC BLOOD PRESSURE: 66 MMHG | SYSTOLIC BLOOD PRESSURE: 124 MMHG | BODY MASS INDEX: 46.89 KG/M2 | WEIGHT: 279.6 LBS | HEART RATE: 100 BPM

## 2023-05-11 VITALS
BODY MASS INDEX: 46.65 KG/M2 | HEART RATE: 98 BPM | HEIGHT: 65 IN | WEIGHT: 280 LBS | SYSTOLIC BLOOD PRESSURE: 125 MMHG | DIASTOLIC BLOOD PRESSURE: 57 MMHG | OXYGEN SATURATION: 98 %

## 2023-05-11 DIAGNOSIS — O24.414 INSULIN CONTROLLED GESTATIONAL DIABETES MELLITUS (GDM) IN SECOND TRIMESTER: Primary | ICD-10-CM

## 2023-05-11 DIAGNOSIS — F90.0 ATTENTION DEFICIT HYPERACTIVITY DISORDER (ADHD), PREDOMINANTLY INATTENTIVE TYPE: ICD-10-CM

## 2023-05-11 DIAGNOSIS — O24.414 INSULIN CONTROLLED GESTATIONAL DIABETES MELLITUS (GDM) IN SECOND TRIMESTER: ICD-10-CM

## 2023-05-11 DIAGNOSIS — F32.A ANXIETY AND DEPRESSION: ICD-10-CM

## 2023-05-11 DIAGNOSIS — R76.8 HSV-2 SEROPOSITIVE: ICD-10-CM

## 2023-05-11 DIAGNOSIS — O24.813 OTHER PRE-EXISTING DIABETES MELLITUS DURING PREGNANCY IN THIRD TRIMESTER: ICD-10-CM

## 2023-05-11 DIAGNOSIS — O24.813 OTHER PRE-EXISTING DIABETES MELLITUS DURING PREGNANCY IN THIRD TRIMESTER: Primary | ICD-10-CM

## 2023-05-11 DIAGNOSIS — E66.01 MORBID OBESITY (H): ICD-10-CM

## 2023-05-11 DIAGNOSIS — F41.9 ANXIETY AND DEPRESSION: ICD-10-CM

## 2023-05-11 DIAGNOSIS — O09.522 MULTIGRAVIDA OF ADVANCED MATERNAL AGE IN SECOND TRIMESTER: ICD-10-CM

## 2023-05-11 DIAGNOSIS — O09.513 HIGH-RISK PREGNANCY, PRIMIGRAVIDA OF ADVANCED MATERNAL AGE IN THIRD TRIMESTER: Primary | ICD-10-CM

## 2023-05-11 PROCEDURE — G0108 DIAB MANAGE TRN  PER INDIV: HCPCS

## 2023-05-11 PROCEDURE — 99214 OFFICE O/P EST MOD 30 MIN: CPT | Performed by: NURSE PRACTITIONER

## 2023-05-11 PROCEDURE — 99207 PR COMPLICATED OB VISIT: CPT | Performed by: FAMILY MEDICINE

## 2023-05-11 NOTE — PATIENT INSTRUCTIONS
Call maternal-fetal medicine at [facility] in [place] and see if you can schedule a once weekly nonstress test along with a once weekly biophysical profile beginning next week at 32 weeks gestation.  You will need a repeat growth ultrasound with your biophysical profile at 34 weeks gestation.    [facility] Information  If you have any further concerns or wish to schedule another appointment, please call our office at [phone].  Call your doctor if you experience any bleeding or abdominal cramping early in pregnancy.     For uncomplicated pregnancies, you will be seeing your doctor once a month until you are 28 weeks, then you will see your doctor twice a month. You will begin weekly visits at 36 weeks until you deliver.     If you have a medical emergency, please call [phone].    [facility] Labor and Delivery: [phone]  [facility] Labor and Delivery: [phone]    When to call or come in to the hospital  If you notice a decrease in your baby's movement.   If your begin to experience contractions that are 5 minutes or less apart and lasting for over 45 seconds, or if contractions are becoming more painful.  If you have any bleeding or leakage of fluids.   If you have a headache not resolved with tylenol, right upper abdominal pain, or sudden onset of swelling.  You know your body best. Never hesitate to call or go to the hospital if something doesn't feel right!  After-baby Birth Control Methods   It is important to consider contraception after your baby is born if you would like to prevent a pregnancy in the near future. If you are breast feeding, talk with your doctor to determine the best method for you. It is recommended that you wait 12 months after the birth of your baby to get pregnant again.   Condoms   Latex condoms can prevent pregnancy and STDs. Condoms work best when used with spermicide that is placed inside the vagina as well as inside the condom. Use only water-based  lubricants. Petroleum based products (such as Vaseline and many massage oils) can weaken the latex and cause it to break.   Iud   IUD's are made of flexible plastic and must be inserted into your uterus by a doctor. The IUD works by stopping the fertilized egg from attaching itself to the uterus. IUDs may increase the risk of getting a pelvic infection (PID), which can lead to infertility if not diagnosed and treated early. This is a great option after delivery of your baby! These last for 3, 5, or 10 years depending up on which type you choose, but can be removed earlier if you decide you would like to get pregnant sooner.  Tubal Ligation & Vasectomy   These are good choices for women and men who know that they do not want to have any more children.     HORMONES   Birth control pills, hormone implants and shots work by stopping ovulation (release of the egg from the ovary). Implants are placed in the upper arm by a minor surgical incision. They last for five years, but can be removed by your doctor if you decide to get pregnant. Hormone injections must be repeated every three months. The Pill must be taken every day.   All hormones can have side effects and create certain health risks. They are very effective in preventing pregnancy but they do not prevent STDs. You can talk more about the risks and benefits with your doctor.

## 2023-05-11 NOTE — PROGRESS NOTES
[facility]  ENDOCRINOLOGY    Gestational Diabetes 2023    Teetee Alvarado, 1985, [id number]          Reason for visit      1. Insulin controlled gestational diabetes mellitus (GDM) in second trimester        HPI     Teetee Alvarado is a very pleasant 38 year old old female who presents for GESTATIONAL Diabetes Mellitus.  She is currently 31w2d  pregnant . Due date is 23.  Diagnosed with GDM based on an OGTT. She hasnot had  GDM in prior pregnancies.   Current carbohydrate intake:consistent with recommendations of 30g-60g-60g.  I have reviewed her blood glucose logs and note that the:  Fasting readings  are:in range on current regimen  Postprandial readings are:in range on current regimen  Current Levemir dose: 32  Current Prandial insulin: 32//32  Blood glucose logs/meter brought in and data reviewed and incorporated into decision-making.  Planned delivery at:   OBN: Zac    Therapy/Interventions in the past:  She has been seen by the Diabetes Educator- and has received instruction on carbohydrate counting and  consistency.  Records from referring provider and other sources have also been reviewed and incorporated into decision-making.      TODAY:    Teetee is here in f/u for GDM. We are joined by LISA Hein. She brings her BG today and she continues to struggle for control. We will increase both her prandial and HS insulin doses.  Encouraged to use the 2x4 rule and just keep increasing. Baby is moving appropriately and she is having no swelling. Baby is measuring 2 weeks ahead.     Past Medical History       Patient Active Problem List   Diagnosis     Attention deficit hyperactivity disorder (ADHD), predominantly inattentive type     PCOS (polycystic ovarian syndrome)     Anxiety and depression     Anal fistula     Gastroesophageal reflux disease     HSV-2 seropositive     Morbid obesity (H)     Prediabetes     Multigravida of advanced maternal age in second trimester     Elevated glucose  tolerance test     Insulin controlled gestational diabetes mellitus (GDM) in second trimester     High-risk pregnancy, primigravida of advanced maternal age in third trimester        Past Surgical History     Past Surgical History:   Procedure Laterality Date     ABSCESS DRAINAGE Left     left leg     LASIK       WISDOM TOOTH EXTRACTION         Family History     Family History   Problem Relation Age of Onset     Diabetes Type 2  Father      Arthritis Father        Social History     Social History     Tobacco Use     Smoking status: Every Day     Types: Vaping Device     Smokeless tobacco: Never     Tobacco comments:     vaping-daily   Substance Use Topics     Alcohol use: Not Currently     Drug use: Yes     Frequency: 7.0 times per week     Types: Marijuana     Comment: Drug use: daily       Review of Systems     Patient has no polyuria or polydipsia, no chest pain, dyspnea or TIA's, no numbness, tingling or pain in extremities  Remainder negative except as noted in HPI.      Vital Signs     /66 (BP Location: Right arm, Patient Position: Sitting)   Pulse 100   Wt 126.8 kg (279 lb 9.6 oz)   LMP  (LMP Unknown)   BMI 46.89 kg/m    Wt Readings from Last 3 Encounters:   05/11/23 126.8 kg (279 lb 9.6 oz)   05/11/23 127 kg (280 lb)   04/27/23 125.6 kg (277 lb)       Physical Exam     GENERAL: Pleasant, alert, appropriate appearance for age. No acute distress,   HEENT: Normocephalic, atraumatic  NECK: normal in appearance  CHEST/RESPIRATORY: Normal chest wall and respirations.   ABDOMEN: Gravid   SKIN: No melanosis,  ecchymosis,  vitiligo. No acanthosis nigricans  NEURO:  Non-focal, no tremor.  PSYCH: Alert and oriented -appropriate affect. Orientation, judgement and memory appear intact.  MSK: No joint abnormalities, FROM in all four extremities. No kyphosis      Assessment     1. Insulin controlled gestational diabetes mellitus (GDM) in second trimester        Plan     1. GESTATIONAL DIABETES-  Adjust dose as  follows:     -Levemir [id number]   units. Increase by 4 units every 2 days to keep fasting blood glucose below 95mg/dL    -Novolog 42  units with breakfast  -Novolog 42 units with lunch   -Novolog 42 units with dinner      -Increase by 4 units every 2 days to keep 1 hour after meal blood glucose less than 140mg/dL     We reviewed glucose goals of fasting blood glucose <95 mg/dL and 1 hour post prandial blood glucose of <140 mg/dL.    Monitor blood sugar 4 times daily: Fasting  and 1 hour after each meal.  Contact  this clinic [phone] if blood glucose is not within the above-mentioned goals.      We discussed the importance of excellent glycemic control during pregnancy to limit complications such as fetal macrosomia, shoulder dystocia,  hypoglycemia and hyperbilirubinemia.  I have discussed the patient's increased risk of recurrent GDM and/or development of type 2 diabetes later in life.          Key Sahu NP  2023      Lab Results     Microalbumin Urine mg/dL   Date Value Ref Range Status   2021 1.03 0.00 - 1.99 mg/dL Final       Cholesterol   Date Value Ref Range Status   2022 155 <200 mg/dL Final     Direct Measure HDL   Date Value Ref Range Status   2022 47 (L) >=50 mg/dL Final     Triglycerides   Date Value Ref Range Status   2022 99 <150 mg/dL Final             Current Medications

## 2023-05-11 NOTE — LETTER
5/11/2023         RE: Teetee Alvarado  [address]  [place] [address]        Dear Colleague,    Thank you for referring your patient, Teetee Alvarado, to the [facility]. Please see a copy of my visit note below.    Diabetes Self-Management and Care Support    Teetee is here alone for her follow up on Gestational Diabetes.  She is currently taking insulin to help control her glucose.  She is feeling good today.  Stated she is not having swelling in her hands, not having swelling in her feet and her baby is moving well.  She had an ultrasound recently and baby is in the 93rd percentile, 2 weeks ahead.  Her fluid levels are normal.  She is having more trouble with carpel tunnel, this is worse at night.  Discussed wearing braces at night and she will try to remember this    OB-Rosa Frank  EDC-7.12.23-31 weeks 0 days  Pregnancy number-1  OGTT-129/221/182/155  Previous GDM-No    Current insulin doses  Basal Levemir 32 units  Prandial Novolog 32 units before meals    Current blood sugars          Plan-  Increase Levemir to 42 units at bedtime as well as 42 units before meals.  Continue to increase as discussed.  Call if there is any change in readings before next appointment.  Follow up scheduled for 2 weeks with endocrinology.       The service provided today was under the supervising provider, Key Sahu, who was available if needed.     Melyssa Baker RN, [facility]  [phone]  In Clinic Tuesday, Wednesday, Thursday  In person   DSMT 30 minutes

## 2023-05-11 NOTE — PROGRESS NOTES
"Teetee Alvarado is a 38 year old  female who presents to clinic for a follow up OB visit. She is currently 31w1d with an estimated date of delivery of 2023 via 22-week ultrasound. She denies headache, chest pain, shortness of breath, abdominal pain/contractions, vaginal bleeding, or abnormal discharge. She continues to feel baby move.     New concerns today: Eating with endocrinology/diabetic educator.  Planning on increasing her insulin doses due to hyperglycemia.    OB History    Para Term  AB Living   1 0 0 0 0 0   SAB IAB Ectopic Multiple Live Births   0 0 0 0 0      # Outcome Date GA Lbr Jose F/2nd Weight Sex Delivery Anes PTL Lv   1 Current                Physical exam:  /57   Pulse 98   Ht 1.645 m (5' 4.75\")   Wt 127 kg (280 lb)   LMP  (LMP Unknown)   SpO2 98%   BMI 46.95 kg/m      General: alert, female in no acute distress  Abdomen: gravid uterus appropriate for gestation age at 33 cm above pubic symphysis, non-tender, FHTs 140  Extremities: no edema    Prenatal labs  Blood type: A+  Hgb: 13.6-->13.1  Pap: Will need postpartum  Hep B, HIV, syphilisx2, gonorrhea, chlamydia, HIV negative  Rubella immune  UA/Ucx normal  Tdap 2023  Plans for epidural  Boy! Desires circumcision    Assessment/Plan:  High-risk pregnancy, primigravida of advanced maternal age in third trimester  Reviewed reassuring fetal echocardiogram.  On daily baby aspirin for preeclampsia prophylaxis which will be stopped prior to planned induction.     Morbid obesity (H)  Body mass index is 46.95 kg/m . Recommended weight gain throughout pregnancy 11 to 20 pounds.   BPP with growth ultrasound at 34 weeks.     Insulin controlled gestational diabetes mellitus (GDM) in third trimester  Prediabetes  Following with endocrinology/diabetes education now requiring basal/bolus insulin for GDM. Will need twice-weekly testing beginning at 32 weeks with delivery at 39 weeks due to maternal BMI greater than 40 (orders " placed, patient should call M to schedule twice-weekly testing beginning next week).  If uncontrolled glucose levels, may need sooner induction.  - US Fetal Biophys Prof w/o Non Stress Test; Standing  - US OB Biophys Single Gestation Measure; Future     HSV-2 seropositive  Will need acyclovir prophylaxis at 36 weeks.     Attention deficit hyperactivity disorder (ADHD), predominantly inattentive type  No longer on stimulant medication due to pregnancy.     Anxiety and depression  Stable off sertraline, monitor for labile mood and postpartum depression.     Follow up in 2 weeks for routine prenatal visit.     Rosa Frank DO

## 2023-05-11 NOTE — LETTER
2023         RE: Teetee Alvarado  [address]  [place] [address]        Dear Colleague,    Thank you for referring your patient, Teetee Alvarado, to the [facility]. Please see a copy of my visit note below.    [facility]  ENDOCRINOLOGY    Gestational Diabetes 2023    Teetee Alvarado, 1985, [id number]          Reason for visit      1. Insulin controlled gestational diabetes mellitus (GDM) in second trimester        HPI     Teetee Alvarado is a very pleasant 38 year old old female who presents for GESTATIONAL Diabetes Mellitus.  She is currently 31w2d  pregnant . Due date is 23.  Diagnosed with GDM based on an OGTT. She hasnot had  GDM in prior pregnancies.   Current carbohydrate intake:consistent with recommendations of 30g-60g-60g.  I have reviewed her blood glucose logs and note that the:  Fasting readings  are:in range on current regimen  Postprandial readings are:in range on current regimen  Current Levemir dose: 32  Current Prandial insulin: 32//32  Blood glucose logs/meter brought in and data reviewed and incorporated into decision-making.  Planned delivery at:   OBCOURTNEYN: [facility]    Therapy/Interventions in the past:  She has been seen by the Diabetes Educator- and has received instruction on carbohydrate counting and  consistency.  Records from referring provider and other sources have also been reviewed and incorporated into decision-making.      TODAY:    Teetee is here in f/u for GDM. We are joined by LISA Hein. She brings her BG today and she continues to struggle for control. We will increase both her prandial and HS insulin doses.  Encouraged to use the 2x4 rule and just keep increasing. Baby is moving appropriately and she is having no swelling. Baby is measuring 2 weeks ahead.     Past Medical History       Patient Active Problem List   Diagnosis     Attention deficit hyperactivity disorder (ADHD), predominantly inattentive type      PCOS (polycystic ovarian syndrome)     Anxiety and depression     Anal fistula     Gastroesophageal reflux disease     HSV-2 seropositive     Morbid obesity (H)     Prediabetes     Multigravida of advanced maternal age in second trimester     Elevated glucose tolerance test     Insulin controlled gestational diabetes mellitus (GDM) in second trimester     High-risk pregnancy, primigravida of advanced maternal age in third trimester        Past Surgical History     Past Surgical History:   Procedure Laterality Date     ABSCESS DRAINAGE Left     left leg     LASIK       WISDOM TOOTH EXTRACTION         Family History     Family History   Problem Relation Age of Onset     Diabetes Type 2  Father      Arthritis Father        Social History     Social History     Tobacco Use     Smoking status: Every Day     Types: Vaping Device     Smokeless tobacco: Never     Tobacco comments:     vaping-daily   Substance Use Topics     Alcohol use: Not Currently     Drug use: Yes     Frequency: 7.0 times per week     Types: Marijuana     Comment: Drug use: daily       Review of Systems     Patient has no polyuria or polydipsia, no chest pain, dyspnea or TIA's, no numbness, tingling or pain in extremities  Remainder negative except as noted in HPI.      Vital Signs     /66 (BP Location: Right arm, Patient Position: Sitting)   Pulse 100   Wt 126.8 kg (279 lb 9.6 oz)   LMP  (LMP Unknown)   BMI 46.89 kg/m    Wt Readings from Last 3 Encounters:   05/11/23 126.8 kg (279 lb 9.6 oz)   05/11/23 127 kg (280 lb)   04/27/23 125.6 kg (277 lb)       Physical Exam     GENERAL: Pleasant, alert, appropriate appearance for age. No acute distress,   HEENT: Normocephalic, atraumatic  NECK: normal in appearance  CHEST/RESPIRATORY: Normal chest wall and respirations.   ABDOMEN: Gravid   SKIN: No melanosis,  ecchymosis,  vitiligo. No acanthosis nigricans  NEURO:  Non-focal, no tremor.  PSYCH: Alert and oriented -appropriate affect. Orientation,  judgement and memory appear intact.  MSK: No joint abnormalities, FROM in all four extremities. No kyphosis      Assessment     1. Insulin controlled gestational diabetes mellitus (GDM) in second trimester        Plan     1. GESTATIONAL DIABETES-  Adjust dose as follows:     -Levemir [id number]   units. Increase by 4 units every 2 days to keep fasting blood glucose below 95mg/dL    -Novolog 42  units with breakfast  -Novolog 42 units with lunch   -Novolog 42 units with dinner      -Increase by 4 units every 2 days to keep 1 hour after meal blood glucose less than 140mg/dL     We reviewed glucose goals of fasting blood glucose <95 mg/dL and 1 hour post prandial blood glucose of <140 mg/dL.    Monitor blood sugar 4 times daily: Fasting  and 1 hour after each meal.  Contact  this clinic [phone] if blood glucose is not within the above-mentioned goals.      We discussed the importance of excellent glycemic control during pregnancy to limit complications such as fetal macrosomia, shoulder dystocia,  hypoglycemia and hyperbilirubinemia.  I have discussed the patient's increased risk of recurrent GDM and/or development of type 2 diabetes later in life.          Key Sahu NP  2023      Lab Results     Microalbumin Urine mg/dL   Date Value Ref Range Status   2021 1.03 0.00 - 1.99 mg/dL Final       Cholesterol   Date Value Ref Range Status   2022 155 <200 mg/dL Final     Direct Measure HDL   Date Value Ref Range Status   2022 47 (L) >=50 mg/dL Final     Triglycerides   Date Value Ref Range Status   2022 99 <150 mg/dL Final             Current Medications           Again, thank you for allowing me to participate in the care of your patient.        Sincerely,        Key Sahu NP

## 2023-05-11 NOTE — LETTER
May 11, 2023      Teetee Alvarado  [address] [place] [id number]        To Whom It May Concern:    Teetee Alvarado is a patient of our clinic and is being seen for pregnancy care. Due to medical reasons and induction expectations please excuse her from work Tuesday, July 4, 2023.        Sincerely,        Rosa Frank, DO

## 2023-05-12 DIAGNOSIS — O24.113 TYPE 2 DIABETES MELLITUS COMPLICATING PREGNANCY IN THIRD TRIMESTER, ANTEPARTUM: Primary | ICD-10-CM

## 2023-05-12 DIAGNOSIS — O99.213 OBESITY AFFECTING PREGNANCY IN THIRD TRIMESTER: ICD-10-CM

## 2023-05-16 DIAGNOSIS — O24.813 OTHER PRE-EXISTING DIABETES MELLITUS DURING PREGNANCY IN THIRD TRIMESTER: ICD-10-CM

## 2023-05-16 NOTE — PROGRESS NOTES
Diabetes Self-Management and Care Support    Teetee is here alone for her follow up on Gestational Diabetes.  She is currently taking insulin to help control her glucose.  She is feeling good today.  Stated she is not having swelling in her hands, not having swelling in her feet and her baby is moving well.  She had an ultrasound recently and baby is in the 93rd percentile, 2 weeks ahead.  Her fluid levels are normal.  She is having more trouble with carpel tunnel, this is worse at night.  Discussed wearing braces at night and she will try to remember this    OB-Rosa Frank  EDC-7.12.23-31 weeks 0 days  Pregnancy number-1  OGTT-129/221/182/155  Previous GDM-No    Current insulin doses  Basal Levemir 32 units  Prandial Novolog 32 units before meals    Current blood sugars          Plan-  Increase Levemir to 42 units at bedtime as well as 42 units before meals.  Continue to increase as discussed.  Call if there is any change in readings before next appointment.  Follow up scheduled for 2 weeks with endocrinology.       The service provided today was under the supervising provider, Key Sahu, who was available if needed.     Melyssa Baker RN, [facility]  [phone]  In Clinic Tuesday, Wednesday, Thursday  In person   DSMT 30 minutes

## 2023-05-19 ENCOUNTER — ANCILLARY PROCEDURE (OUTPATIENT)
Dept: ULTRASOUND IMAGING | Facility: HOSPITAL | Age: 38
End: 2023-05-19
Attending: OBSTETRICS & GYNECOLOGY
Payer: COMMERCIAL

## 2023-05-19 ENCOUNTER — OFFICE VISIT (OUTPATIENT)
Dept: MATERNAL FETAL MEDICINE | Facility: HOSPITAL | Age: 38
End: 2023-05-19
Attending: OBSTETRICS & GYNECOLOGY
Payer: COMMERCIAL

## 2023-05-19 DIAGNOSIS — O24.113 TYPE 2 DIABETES MELLITUS COMPLICATING PREGNANCY IN THIRD TRIMESTER, ANTEPARTUM: Primary | ICD-10-CM

## 2023-05-19 DIAGNOSIS — O99.213 OBESITY AFFECTING PREGNANCY IN THIRD TRIMESTER: ICD-10-CM

## 2023-05-19 DIAGNOSIS — O24.113 TYPE 2 DIABETES MELLITUS COMPLICATING PREGNANCY IN THIRD TRIMESTER, ANTEPARTUM: ICD-10-CM

## 2023-05-19 PROCEDURE — 99207 PR NO CHARGE LOS: CPT | Performed by: OBSTETRICS & GYNECOLOGY

## 2023-05-19 PROCEDURE — 76819 FETAL BIOPHYS PROFIL W/O NST: CPT

## 2023-05-19 PROCEDURE — 76819 FETAL BIOPHYS PROFIL W/O NST: CPT | Mod: 26 | Performed by: OBSTETRICS & GYNECOLOGY

## 2023-05-19 NOTE — PROGRESS NOTES
"Please see \"Imaging\" tab under Chart Review for full details.    Gisell Matt MD  Maternal Fetal Medicine      "

## 2023-05-19 NOTE — NURSING NOTE
Teetee Alvarado is a  at 32w2d who presents to [facility] for scheduled biophysical profile. Pt reports positive fetal movement. Pt denies bldg/lof/change in discharge, contractions, headache, vision changes, chest pain/SOB or edema. SBAR given to Dr. Matt, see note in Epic.

## 2023-05-22 ENCOUNTER — TRANSFERRED RECORDS (OUTPATIENT)
Dept: HEALTH INFORMATION MANAGEMENT | Facility: CLINIC | Age: 38
End: 2023-05-22

## 2023-05-22 ENCOUNTER — ANCILLARY PROCEDURE (OUTPATIENT)
Dept: ULTRASOUND IMAGING | Facility: HOSPITAL | Age: 38
End: 2023-05-22
Attending: OBSTETRICS & GYNECOLOGY
Payer: COMMERCIAL

## 2023-05-22 ENCOUNTER — OFFICE VISIT (OUTPATIENT)
Dept: MATERNAL FETAL MEDICINE | Facility: HOSPITAL | Age: 38
End: 2023-05-22
Attending: OBSTETRICS & GYNECOLOGY
Payer: COMMERCIAL

## 2023-05-22 DIAGNOSIS — O99.213 OBESITY AFFECTING PREGNANCY IN THIRD TRIMESTER: ICD-10-CM

## 2023-05-22 DIAGNOSIS — O24.113 TYPE 2 DIABETES MELLITUS COMPLICATING PREGNANCY IN THIRD TRIMESTER, ANTEPARTUM: ICD-10-CM

## 2023-05-22 DIAGNOSIS — O24.113 TYPE 2 DIABETES MELLITUS COMPLICATING PREGNANCY IN THIRD TRIMESTER, ANTEPARTUM: Primary | ICD-10-CM

## 2023-05-22 PROCEDURE — 76818 FETAL BIOPHYS PROFILE W/NST: CPT | Mod: 26 | Performed by: STUDENT IN AN ORGANIZED HEALTH CARE EDUCATION/TRAINING PROGRAM

## 2023-05-22 PROCEDURE — 99207 PR NO CHARGE LOS: CPT | Performed by: STUDENT IN AN ORGANIZED HEALTH CARE EDUCATION/TRAINING PROGRAM

## 2023-05-22 PROCEDURE — 76818 FETAL BIOPHYS PROFILE W/NST: CPT

## 2023-05-22 NOTE — NURSING NOTE
Teetee Alvarado is a  at 32w5d who presents to [facility] for BPP. Pt reports positive fetal movement. Pt denies bldg/lof/change in discharge, contractions, headache, vision changes, chest pain/SOB or edema. SBAR given to Dr. Samson, see note in Epic.

## 2023-05-22 NOTE — PROGRESS NOTES
Please see the full imaging report from the View[employer] program under the imaging tab.    Melvina Samson MD  Maternal Fetal Medicine

## 2023-05-25 ENCOUNTER — ALLIED HEALTH/NURSE VISIT (OUTPATIENT)
Dept: EDUCATION SERVICES | Facility: CLINIC | Age: 38
End: 2023-05-25
Payer: COMMERCIAL

## 2023-05-25 ENCOUNTER — OFFICE VISIT (OUTPATIENT)
Dept: MATERNAL FETAL MEDICINE | Facility: HOSPITAL | Age: 38
End: 2023-05-25
Attending: OBSTETRICS & GYNECOLOGY
Payer: COMMERCIAL

## 2023-05-25 ENCOUNTER — PRENATAL OFFICE VISIT (OUTPATIENT)
Dept: FAMILY MEDICINE | Facility: CLINIC | Age: 38
End: 2023-05-25
Payer: COMMERCIAL

## 2023-05-25 ENCOUNTER — ANCILLARY PROCEDURE (OUTPATIENT)
Dept: ULTRASOUND IMAGING | Facility: HOSPITAL | Age: 38
End: 2023-05-25
Attending: OBSTETRICS & GYNECOLOGY
Payer: COMMERCIAL

## 2023-05-25 ENCOUNTER — OFFICE VISIT (OUTPATIENT)
Dept: ENDOCRINOLOGY | Facility: CLINIC | Age: 38
End: 2023-05-25
Payer: COMMERCIAL

## 2023-05-25 VITALS
WEIGHT: 284.4 LBS | HEART RATE: 103 BPM | DIASTOLIC BLOOD PRESSURE: 80 MMHG | SYSTOLIC BLOOD PRESSURE: 128 MMHG | OXYGEN SATURATION: 95 % | BODY MASS INDEX: 47.69 KG/M2

## 2023-05-25 VITALS
WEIGHT: 285 LBS | DIASTOLIC BLOOD PRESSURE: 76 MMHG | SYSTOLIC BLOOD PRESSURE: 124 MMHG | HEART RATE: 102 BPM | OXYGEN SATURATION: 99 % | BODY MASS INDEX: 47.79 KG/M2

## 2023-05-25 DIAGNOSIS — O24.113 TYPE 2 DIABETES MELLITUS COMPLICATING PREGNANCY IN THIRD TRIMESTER, ANTEPARTUM: ICD-10-CM

## 2023-05-25 DIAGNOSIS — O24.414 INSULIN CONTROLLED GESTATIONAL DIABETES MELLITUS (GDM) IN SECOND TRIMESTER: ICD-10-CM

## 2023-05-25 DIAGNOSIS — O24.414 INSULIN CONTROLLED GESTATIONAL DIABETES MELLITUS (GDM) IN THIRD TRIMESTER: Primary | ICD-10-CM

## 2023-05-25 DIAGNOSIS — F90.0 ATTENTION DEFICIT HYPERACTIVITY DISORDER (ADHD), PREDOMINANTLY INATTENTIVE TYPE: ICD-10-CM

## 2023-05-25 DIAGNOSIS — O09.513 HIGH-RISK PREGNANCY, PRIMIGRAVIDA OF ADVANCED MATERNAL AGE IN THIRD TRIMESTER: Primary | ICD-10-CM

## 2023-05-25 DIAGNOSIS — F32.A ANXIETY AND DEPRESSION: ICD-10-CM

## 2023-05-25 DIAGNOSIS — O24.813 OTHER PRE-EXISTING DIABETES MELLITUS DURING PREGNANCY IN THIRD TRIMESTER: Primary | ICD-10-CM

## 2023-05-25 DIAGNOSIS — O99.213 OBESITY AFFECTING PREGNANCY IN THIRD TRIMESTER: ICD-10-CM

## 2023-05-25 DIAGNOSIS — R76.8 HSV-2 SEROPOSITIVE: ICD-10-CM

## 2023-05-25 DIAGNOSIS — E66.01 MORBID OBESITY (H): ICD-10-CM

## 2023-05-25 DIAGNOSIS — F41.9 ANXIETY AND DEPRESSION: ICD-10-CM

## 2023-05-25 LAB
ALBUMIN UR-MCNC: NEGATIVE MG/DL
APPEARANCE UR: CLEAR
BILIRUB UR QL STRIP: NEGATIVE
COLOR UR AUTO: YELLOW
GLUCOSE UR STRIP-MCNC: >=1000 MG/DL
HGB UR QL STRIP: NEGATIVE
KETONES UR STRIP-MCNC: ABNORMAL MG/DL
LEUKOCYTE ESTERASE UR QL STRIP: NEGATIVE
NITRATE UR QL: NEGATIVE
PH UR STRIP: 6 [PH] (ref 5–8)
SP GR UR STRIP: 1.02 (ref 1–1.03)
UROBILINOGEN UR STRIP-ACNC: 0.2 E.U./DL

## 2023-05-25 PROCEDURE — 76819 FETAL BIOPHYS PROFIL W/O NST: CPT | Mod: 26 | Performed by: OBSTETRICS & GYNECOLOGY

## 2023-05-25 PROCEDURE — 76819 FETAL BIOPHYS PROFIL W/O NST: CPT

## 2023-05-25 PROCEDURE — 2894A VOIDCORRECT: CPT

## 2023-05-25 PROCEDURE — 99207 PR COMPLICATED OB VISIT: CPT | Performed by: FAMILY MEDICINE

## 2023-05-25 PROCEDURE — 99214 OFFICE O/P EST MOD 30 MIN: CPT | Performed by: NURSE PRACTITIONER

## 2023-05-25 PROCEDURE — 95251 CONT GLUC MNTR ANALYSIS I&R: CPT | Performed by: NURSE PRACTITIONER

## 2023-05-25 PROCEDURE — 99207 PR NO CHARGE LOS: CPT | Performed by: OBSTETRICS & GYNECOLOGY

## 2023-05-25 PROCEDURE — G0108 DIAB MANAGE TRN  PER INDIV: HCPCS

## 2023-05-25 PROCEDURE — 81003 URINALYSIS AUTO W/O SCOPE: CPT | Performed by: FAMILY MEDICINE

## 2023-05-25 ASSESSMENT — ENCOUNTER SYMPTOMS
COUGH: 0
SINUS CONGESTION: 0
POLYPHAGIA: 1
NECK MASS: 0
HYPOTENSION: 0
TASTE DISTURBANCE: 0
HYPOTENSION: 0
COUGH DISTURBING SLEEP: 0
HEMOPTYSIS: 0
DECREASED APPETITE: 0
EXERCISE INTOLERANCE: 1
SYNCOPE: 0
RECTAL PAIN: 0
TINGLING: 0
SORE THROAT: 0
POLYDIPSIA: 0
NECK PAIN: 0
HEARTBURN: 1
CONSTIPATION: 0
DIZZINESS: 0
DYSPNEA ON EXERTION: 1
BACK PAIN: 1
BREAST MASS: 0
DISTURBANCES IN COORDINATION: 1
VOMITING: 0
DEPRESSION: 0
DECREASED CONCENTRATION: 1
FATIGUE: 1
WHEEZING: 0
POLYDIPSIA: 0
INCREASED ENERGY: 1
SLEEP DISTURBANCES DUE TO BREATHING: 0
SPEECH CHANGE: 0
HEARTBURN: 1
PANIC: 0
SLEEP DISTURBANCES DUE TO BREATHING: 0
HEADACHES: 0
DISTURBANCES IN COORDINATION: 1
LIGHT-HEADEDNESS: 0
MUSCLE CRAMPS: 1
EXERCISE INTOLERANCE: 1
TROUBLE SWALLOWING: 0
NECK PAIN: 0
PALPITATIONS: 0
RECTAL PAIN: 0
INSOMNIA: 1
HOARSE VOICE: 0
TREMORS: 0
WEIGHT GAIN: 1
SORE THROAT: 0
MYALGIAS: 1
DECREASED CONCENTRATION: 1
SMELL DISTURBANCE: 0
LOSS OF CONSCIOUSNESS: 0
SNORES LOUDLY: 1
JOINT SWELLING: 0
WHEEZING: 0
STIFFNESS: 0
BREAST PAIN: 1
BLOATING: 0
NERVOUS/ANXIOUS: 0
POSTURAL DYSPNEA: 1
SINUS PAIN: 0
DIZZINESS: 0
HALLUCINATIONS: 0
HALLUCINATIONS: 0
SMELL DISTURBANCE: 0
CONSTIPATION: 0
DIARRHEA: 0
COUGH DISTURBING SLEEP: 0
BOWEL INCONTINENCE: 0
ABDOMINAL PAIN: 0
LOSS OF CONSCIOUSNESS: 0
NAUSEA: 0
HYPERTENSION: 0
VOMITING: 0
DEPRESSION: 0
SNORES LOUDLY: 1
CHILLS: 0
NIGHT SWEATS: 0
ORTHOPNEA: 1
CHILLS: 0
FEVER: 0
LEG PAIN: 0
NUMBNESS: 1
PARALYSIS: 0
POLYPHAGIA: 1
BLOOD IN STOOL: 0
BLOATING: 0
NIGHT SWEATS: 0
BACK PAIN: 1
TREMORS: 0
DYSPNEA ON EXERTION: 1
NUMBNESS: 1
NERVOUS/ANXIOUS: 0
WEAKNESS: 0
ARTHRALGIAS: 1
ABDOMINAL PAIN: 0
SHORTNESS OF BREATH: 0
SPUTUM PRODUCTION: 0
JOINT SWELLING: 0
HYPERTENSION: 0
FATIGUE: 1
MEMORY LOSS: 0
HEADACHES: 0
PANIC: 0
STIFFNESS: 0
MUSCLE CRAMPS: 1
WEIGHT LOSS: 0
WEIGHT LOSS: 0
POSTURAL DYSPNEA: 1
WEIGHT GAIN: 1
SEIZURES: 0
MYALGIAS: 1
HEMOPTYSIS: 0
SINUS CONGESTION: 0
LIGHT-HEADEDNESS: 0
BREAST PAIN: 1
PALPITATIONS: 0
JAUNDICE: 0
SEIZURES: 0
COUGH: 0
TINGLING: 0
SPEECH CHANGE: 0
HOARSE VOICE: 0
JAUNDICE: 0
INCREASED ENERGY: 1
INSOMNIA: 1
NECK MASS: 0
SINUS PAIN: 0
DIARRHEA: 0
TASTE DISTURBANCE: 0
SYNCOPE: 0
ARTHRALGIAS: 1
ORTHOPNEA: 1
ALTERED TEMPERATURE REGULATION: 1
TROUBLE SWALLOWING: 0
SHORTNESS OF BREATH: 0
BLOOD IN STOOL: 0
DECREASED APPETITE: 0
ALTERED TEMPERATURE REGULATION: 1
PARALYSIS: 0
FEVER: 0
MUSCLE WEAKNESS: 0
BOWEL INCONTINENCE: 0
NAUSEA: 0
MEMORY LOSS: 0
SPUTUM PRODUCTION: 0
LEG PAIN: 0
BREAST MASS: 0
MUSCLE WEAKNESS: 0
WEAKNESS: 0

## 2023-05-25 NOTE — PROGRESS NOTES
Please see the imaging tab for details of the ultrasound performed today.    Cora Vernon MD  Specialist in Maternal-Fetal Medicine

## 2023-05-25 NOTE — PROGRESS NOTES
[facility]  ENDOCRINOLOGY    Gestational Diabetes 2023    Teetee Alvarado, 1985, [id number]          Reason for visit      1. Insulin controlled gestational diabetes mellitus (GDM) in third trimester        HPI     Teetee Alvarado is a very pleasant 38 year old old female who presents for GESTATIONAL Diabetes Mellitus.  She is currently 33w2d  pregnant . Due date is 23.  Diagnosed with GDM based on an OGTT. She hasnot had  GDM in prior pregnancies.   Current carbohydrate intake:consistent with recommendations of 30g-60g-60g.  I have reviewed her blood glucose logs and note that the:  Fasting readings  are:in range on current regimen  Postprandial readings are:in range on current regimen  Current Levemir dose: 0  Current Prandial insulin: 50/50/50/50  Blood glucose logs/meter brought in and data reviewed and incorporated into decision-making.  Planned delivery at:   OBGYN: [facility]    Therapy/Interventions in the past:  She has been seen by the Diabetes Educator- and has received instruction on carbohydrate counting and  consistency.  Records from referring provider and other sources have also been reviewed and incorporated into decision-making.      TODAY:    Teetee is seen in f/u for GDM. We are joined by LISA Hein. Pt informs us today that she has been using only her Novolog insulin for her management. This was found out by her OB, who encouraged her to speak to us about it. She had both insulins, but, in spite of teaching done by us on 23, when prandial insulin was initiated, she managed to just be using the prandial insulin, instead of both. Her Robert download is quite fraught with significant elevations, including an overnight that was well above 200 all night. She brought in both pens today, and teaching was re-iterated and pt verbalized understanding. We also adjusted her dosing based upon the lower readings.  Melyssa will touch base with her next week to see how things are going. Baby  is moving appropriately and she is having no swelling in her hands or feet. Baby is measuring about a week ahead, per patient.     Past Medical History       Patient Active Problem List   Diagnosis     Attention deficit hyperactivity disorder (ADHD), predominantly inattentive type     PCOS (polycystic ovarian syndrome)     Anxiety and depression     Anal fistula     Gastroesophageal reflux disease     HSV-2 seropositive     Morbid obesity (H)     Prediabetes     Multigravida of advanced maternal age in second trimester     Elevated glucose tolerance test     Insulin controlled gestational diabetes mellitus (GDM) in third trimester     High-risk pregnancy, primigravida of advanced maternal age in third trimester        Past Surgical History     Past Surgical History:   Procedure Laterality Date     ABSCESS DRAINAGE Left     left leg     LASIK       WISDOM TOOTH EXTRACTION         Family History     Family History   Problem Relation Age of Onset     Diabetes Type 2  Father      Arthritis Father        Social History     Social History     Tobacco Use     Smoking status: Every Day     Types: Vaping Device     Smokeless tobacco: Never     Tobacco comments:     vaping-daily   Substance Use Topics     Alcohol use: Not Currently     Drug use: Yes     Frequency: 7.0 times per week     Types: Marijuana     Comment: Drug use: daily       Review of Systems     Patient has no polyuria or polydipsia, no chest pain, dyspnea or TIA's, no numbness, tingling or pain in extremities  Remainder negative except as noted in HPI.      Vital Signs     /80 (BP Location: Left arm, Patient Position: Sitting, Cuff Size: Adult Large)   Pulse 103   Wt 129 kg (284 lb 6.4 oz)   LMP  (LMP Unknown)   SpO2 95%   BMI 47.69 kg/m    Wt Readings from Last 3 Encounters:   05/25/23 129 kg (284 lb 6.4 oz)   05/25/23 129.3 kg (285 lb)   05/11/23 126.8 kg (279 lb 9.6 oz)       Physical Exam     GENERAL: Pleasant, alert, appropriate appearance for  age. No acute distress,   HEENT: Normocephalic, atraumatic  NECK: normal in appearance  CHEST/RESPIRATORY: Normal chest wall and respirations.   ABDOMEN: Gravid   SKIN: No melanosis,  ecchymosis,  vitiligo. No acanthosis nigricans  NEURO:  Non-focal, no tremor.  PSYCH: Alert and oriented -appropriate affect. Orientation, judgement and memory appear intact.  MSK: No joint abnormalities, FROM in all four extremities. No kyphosis      Assessment     1. Insulin controlled gestational diabetes mellitus (GDM) in third trimester        Plan     1. GESTATIONAL DIABETES-  Adjust dose as follows:     -Levemir [id number]   units. Increase by 2 units every 2 days to keep fasting blood glucose below 95mg/dL  -Novolog 42  units with breakfast  -Novolog 42 units with lunch   -Novolog 42 units with dinner     -Increase by 2 units every 2 days to keep 1 hour after meal blood glucose less than 140mg/dL     We reviewed glucose goals of fasting blood glucose <95 mg/dL and 1 hour post prandial blood glucose of <140 mg/dL.    Monitor blood sugar 4 times daily: Fasting  and 1 hour after each meal.  Contact  this clinic [phone] if blood glucose is not within the above-mentioned goals.      We discussed the importance of excellent glycemic control during pregnancy to limit complications such as fetal macrosomia, shoulder dystocia,  hypoglycemia and hyperbilirubinemia.  I have discussed the patient's increased risk of recurrent GDM and/or development of type 2 diabetes later in life.       F/u next week.           Key Sahu NP  2023        Lab Results     Microalbumin Urine mg/dL   Date Value Ref Range Status   2021 1.03 0.00 - 1.99 mg/dL Final       Cholesterol   Date Value Ref Range Status   2022 155 <200 mg/dL Final     Direct Measure HDL   Date Value Ref Range Status   2022 47 (L) >=50 mg/dL Final     Triglycerides   Date Value Ref Range Status   2022 99 <150 mg/dL Final             Current  Medications

## 2023-05-25 NOTE — Clinical Note
5/25/2023         RE: Teetee Alvarado  [address]  [place] [address]        Dear Colleague,    Thank you for referring your patient, Teetee Alvarado, to the [facility]. Please see a copy of my visit note below.                            Again, thank you for allowing me to participate in the care of your patient.        Sincerely,        Key Sahu NP

## 2023-05-25 NOTE — PROGRESS NOTES
Teetee Alvarado is a 38 year old  female who presents to clinic for a follow up OB visit. She is currently 33w1d with an estimated date of delivery of 2023 via 22-week ultrasound. She denies headache, chest pain, shortness of breath, abdominal pain/contractions, vaginal bleeding, or abnormal discharge. She continues to feel baby move.     New concerns today: Last saw endo-2023.  At that time Levemir was increased to 42 units daily along with NovoLog 42 units 3 times daily with meals.  Continues to have twice weekly BPP's and will have an upcoming growth ultrasound at 34 weeks.    Patient notes she has only been taking NovoLog, was unaware these were 2 different types of insulin.  Therefore has been taking 50 units NovoLog 3 times daily with meals and once at night.  Does note occasional overnight lows, and some after eating high-protein, low-carb meals.    OB History    Para Term  AB Living   1 0 0 0 0 0   SAB IAB Ectopic Multiple Live Births   0 0 0 0 0      # Outcome Date GA Lbr Jose F/2nd Weight Sex Delivery Anes PTL Lv   1 Current                Physical exam:  /76   Pulse 102   Wt 129.3 kg (285 lb)   LMP  (LMP Unknown)   SpO2 99%   BMI 47.79 kg/m      General: alert, female in no acute distress  Abdomen: gravid uterus appropriate for gestation age at 34 cm above pubic symphysis, non-tender, FHTs 140  Extremities: Trace edema bilateral lower extremities    Prenatal labs  Blood type: A+  Hgb: 13.6-->13.1  Pap: Will need postpartum  Hep B, HIV, syphilisx2, gonorrhea, chlamydia, HIV negative  Rubella immune  UA/Ucx normal  Tdap 2023  Plans for epidural  Boy! Desires circumcision    Assessment/Plan:  High-risk pregnancy, primigravida of advanced maternal age in third trimester  On daily baby aspirin for preeclampsia prophylaxis which will be stopped prior to planned induction.     Morbid obesity (H)  Body mass index is 47.79 kg/m . TWG 10 lbs. Recommended weight gain  throughout pregnancy 11 to 20 pounds.        Insulin controlled gestational diabetes mellitus (GDM) in third trimester  Prediabetes  Following with endocrinology/diabetes education requiring high doses of basal/bolus insulin for GDM.  Patient was unaware NovoLog and Levemir were different types of insulin so has been taking NovoLog 50 units 4 times daily.  Reviewed the mechanism behind both, and she is actually scheduled with endocrinology later this morning.  They will review these details and likely adjust her regimen moving forward. plan to continue twice-weekly  testing, most recent 8/10 with 2 points deducted for fetal breathing movements, NST remained reactive.  Induction planned for 39 weeks due to maternal BMI greater than 40.  If uncontrolled glucose levels, may need sooner induction.  LGA infant noted at 30-week ultrasound.     HSV-2 seropositive  Will need acyclovir prophylaxis at 36 weeks.     Attention deficit hyperactivity disorder (ADHD), predominantly inattentive type  No longer on stimulant medication due to pregnancy.     Anxiety and depression  Stable off sertraline, monitor for labile mood and postpartum depression.     Follow up in 2 weeks for routine prenatal visit.     Rosa Frank,

## 2023-05-25 NOTE — PATIENT INSTRUCTIONS
Please discuss at your endocrinology visit:  You are currently taking novolog 4 times a day (3 times daily with meals and once at night).  Rather than NovoLog 3 times daily with meals and Levemir once nightly.  As a reminder, I will NovoLog as a short acting medicine used to cover your mealtime blood sugars, while Levemir is a long-acting insulin intended to last throughout the day.    [facility] Information  If you have any further concerns or wish to schedule another appointment, please call our office at [phone].  Call your doctor if you experience any bleeding or abdominal cramping early in pregnancy.     For uncomplicated pregnancies, you will be seeing your doctor once a month until you are 28 weeks, then you will see your doctor twice a month. You will begin weekly visits at 36 weeks until you deliver.     If you have a medical emergency, please call [phone].    [facility] Labor and Delivery: [phone]  [facility] Labor and Delivery: [phone]    When to call or come in to the hospital  If you notice a decrease in your baby's movement.   If your begin to experience contractions that are 5 minutes or less apart and lasting for over 45 seconds, or if contractions are becoming more painful.  If you have any bleeding or leakage of fluids.   If you have a headache not resolved with tylenol, right upper abdominal pain, or sudden onset of swelling.  You know your body best. Never hesitate to call or go to the hospital if something doesn't feel right!    Preparing for the hospital  You re likely feeling anxious as your child s birth approaches. This is normal. To give yourself some peace of mind, pack a bag 3-4 weeks before your due date. Here is a list of things to remember:  Personal care items like toothbrush, hair brush, lip balm, lotion, shampoo, glasses, contacts  Nightgown, bathrobe, slippers  Several pairs of underwear  Comfortable clothes for you to wear home  Camera with new  "batteries  Cell phone and   Insurance information and any other paperwork needed for your hospital stay  Clothes for baby to wear home  An infant, rear-facing car seat for bringing home your baby (this is required by law)  Managing Labor Pain   There are many ways to manage pain during labor. It can often be done with no anesthesia or strong pain medications. Talk to your health care provider about any options you would like to explore.   Help from Relaxation  Some of these are learned in special classes. Your health care provider can help you find classes. The hospital has a tub you can use during early labor. These methods may be of help to you:   Breathing techniques   A warm tub between contractions   Massage and therapeutic touch by your support person or labor    Reading materials that are comforting or inspiring   Music that is soothing   Hypnosis   Acupuncture and acupressure   Heat and cold applicatio  Help From Analgesics   Analgesics are mild medications that reduce pain. They can be used along with some relaxation methods. They can give you pain relief without total loss of feeling. They may lessen the pain of strong contractions. You may feel well enough to nap between contractions. They have little effect on your baby if given early in labor. This may be done by injection or by IV.   Help from Inhaled Medications  Nitrous oxide (\"laughing gas\") mixed with oxygen is another option for anesthesia. This is administered by breathing through a mask that you can take off or put on when you would like. It is safe for you and your baby during labor. Women have various responses to pain relief from this method, typically you will feel less bothered by the pain.   Help From Anesthetics   Anesthesia involves blockage of all feeling including pain. It can be given in the form of local anesthesia or general anesthesia.  Anesthesia is a type of medication to prevent pain. It is often used in labor. It " may numb only one region of your body. This is called regional anesthesia. Or it may let you sleep during surgery. This is called general anesthesia. This type of medication is given by a trained specialist. When possible, regional anesthesia will be used. This is so you can be awake during your baby s birth.   Regional Anesthesia   Regional anesthesia may be used to numb your lower body for a vaginal or  birth. It does not go into your bloodstream. This means that little or none of it will reach your baby. There are two kinds:   Epidural. This is most often given while you sit up or lie on your side. A needle with a flexible tube (catheter) is put in your lower back. The needle is then removed. The anesthetic is sent through the catheter. A pump may be attached. This gives you a constant level of anesthetic. An epidural often only partly affects muscle control. This means you should still be able to push for a vaginal birth.   Spinal. This is most often given in one dose right before delivery. It acts fast. You may sit up or lie down when it is injected. It may affect muscle control in your lower body. This includes the ability to push.  General Anesthesia   General anesthesia lets you sleep and keeps you free of pain during surgery. It may be used for a . It may be given as an injection. It may be given as an inhaled gas. Or it may be given as both. Delivery often occurs before the medication has reached the baby.

## 2023-05-25 NOTE — NURSING NOTE
Patient reports active fetal movement, denies pain, regular contractions, leaking of fluid, or bleeding.  Reports blood sugar values fasting and post prandial values have had some elevations. Reviewed changes in insulin.  Patient denies headache, visual changes, nausea/vomiting, epigastric pain related to preeclampsia.  SBAR given to SHUN URENA, see their note in Epic.

## 2023-05-25 NOTE — LETTER
5/25/2023         RE: Teetee Alvarado  [address]  [place] [id number]        Dear Colleague,    Thank you for referring your patient, Teetee Alvarado, to the [facility]. Please see a copy of my visit note below.    Diabetes Self-Management and Care Support    Teetee is here alone for her follow up on Gestational Diabetes.  She is currently taking insulin to help control her glucose.  She is feeling okay.  Stated she is not having swelling in her hands, is having some swelling in her feet and her baby is moving okay.  Stated she is going to be induced July 5th, earlier if not earlier depending on her ultrasound and blood sugars.  She is measuring 1 week ahead at this point.    At her OB appointment it was learned that she has been taking her insulin incorrectly.  She has been taking Novolog both before meals and at bedtime.  Reviewed how each insulin works and when she should be taking each.  She did bring her pens into the clinic today.      OB-Rosa Frank  EDC-7.12.23-33 weeks 1 day  Pregnancy number-1  OGTT-129/221/182/155  3.13 A1c 5.9%  Previous GDM-No    Current insulin doses  Basal Levemir 42 units  Prandial Novolog 42 units    Current blood sugars  Please see note from endocrinology        Plan-  Given confusion in insulins, will change doses to Levemir 40 units and Novolog 42 to start.  Will review her readings next week and send message via [employer] if adjustments needed.  Call if there is any change in readings before next appointment.  Follow up scheduled for 2 weeks with endocrinology.       The service provided today was under the supervising provider, Key Sahu, who was available if needed.     Melyssa Baker RN, [facility]  [phone]  In Clinic Tuesday, Wednesday, Thursday  In person   DSMT 30 minutes

## 2023-05-30 ENCOUNTER — OFFICE VISIT (OUTPATIENT)
Dept: MATERNAL FETAL MEDICINE | Facility: HOSPITAL | Age: 38
End: 2023-05-30
Attending: OBSTETRICS & GYNECOLOGY
Payer: COMMERCIAL

## 2023-05-30 ENCOUNTER — ANCILLARY PROCEDURE (OUTPATIENT)
Dept: ULTRASOUND IMAGING | Facility: HOSPITAL | Age: 38
End: 2023-05-30
Attending: OBSTETRICS & GYNECOLOGY
Payer: COMMERCIAL

## 2023-05-30 DIAGNOSIS — O24.113 TYPE 2 DIABETES MELLITUS COMPLICATING PREGNANCY IN THIRD TRIMESTER, ANTEPARTUM: ICD-10-CM

## 2023-05-30 DIAGNOSIS — O24.414 INSULIN CONTROLLED GESTATIONAL DIABETES MELLITUS (GDM) IN THIRD TRIMESTER: Primary | ICD-10-CM

## 2023-05-30 DIAGNOSIS — O99.213 OBESITY AFFECTING PREGNANCY IN THIRD TRIMESTER: ICD-10-CM

## 2023-05-30 PROCEDURE — 99207 PR NO CHARGE LOS: CPT | Performed by: OBSTETRICS & GYNECOLOGY

## 2023-05-30 PROCEDURE — 76819 FETAL BIOPHYS PROFIL W/O NST: CPT

## 2023-05-30 PROCEDURE — 76819 FETAL BIOPHYS PROFIL W/O NST: CPT | Mod: 26 | Performed by: OBSTETRICS & GYNECOLOGY

## 2023-05-30 NOTE — NURSING NOTE
Teetee Alvarado is a  at 33w6d who presents to [facility] for BPP. Pt reports positive fetal movement. Pt denies bldg/lof/change in discharge, contractions, headache, vision changes, chest pain/SOB or edema. SBAR given to Dr. Alvarado, see note in Epic.

## 2023-05-30 NOTE — PROGRESS NOTES
Please see full imaging report from [employer] program under imaging tab.    Ladonna Alvarado MD  Maternal Fetal Medicine

## 2023-05-31 ENCOUNTER — MYC MEDICAL ADVICE (OUTPATIENT)
Dept: FAMILY MEDICINE | Facility: CLINIC | Age: 38
End: 2023-05-31
Payer: COMMERCIAL

## 2023-05-31 NOTE — PROGRESS NOTES
Diabetes Self-Management and Care Support    Teetee is here alone for her follow up on Gestational Diabetes.  She is currently taking insulin to help control her glucose.  She is feeling okay.  Stated she is not having swelling in her hands, is having some swelling in her feet and her baby is moving okay.  Stated she is going to be induced July 5th, earlier if not earlier depending on her ultrasound and blood sugars.  She is measuring 1 week ahead at this point.    At her OB appointment it was learned that she has been taking her insulin incorrectly.  She has been taking Novolog both before meals and at bedtime.  Reviewed how each insulin works and when she should be taking each.  She did bring her pens into the clinic today.      OB-Rosa Frank  EDC-7.12.23-33 weeks 1 day  Pregnancy number-1  OGTT-129/221/182/155  3.13 A1c 5.9%  Previous GDM-No    Current insulin doses  Basal Levemir 42 units  Prandial Novolog 42 units    Current blood sugars  Please see note from endocrinology        Plan-  Given confusion in insulins, will change doses to Levemir 40 units and Novolog 42 to start.  Will review her readings next week and send message via [employer] if adjustments needed.  Call if there is any change in readings before next appointment.  Follow up scheduled for 2 weeks with endocrinology.       The service provided today was under the supervising provider, Key Sahu, who was available if needed.     Melyssa Baker RN, [facility]  [phone]  In Clinic Tuesday, Wednesday, Thursday  In person   DSMT 30 minutes

## 2023-06-01 ENCOUNTER — HOSPITAL ENCOUNTER (OUTPATIENT)
Dept: ULTRASOUND IMAGING | Facility: HOSPITAL | Age: 38
Discharge: HOME | End: 2023-06-01
Attending: FAMILY MEDICINE | Admitting: FAMILY MEDICINE
Payer: COMMERCIAL

## 2023-06-01 DIAGNOSIS — O09.522 MULTIGRAVIDA OF ADVANCED MATERNAL AGE IN SECOND TRIMESTER: ICD-10-CM

## 2023-06-01 DIAGNOSIS — O24.414 INSULIN CONTROLLED GESTATIONAL DIABETES MELLITUS (GDM) IN SECOND TRIMESTER: ICD-10-CM

## 2023-06-01 DIAGNOSIS — O09.513 HIGH-RISK PREGNANCY, PRIMIGRAVIDA OF ADVANCED MATERNAL AGE IN THIRD TRIMESTER: ICD-10-CM

## 2023-06-01 DIAGNOSIS — E66.01 MORBID OBESITY (H): ICD-10-CM

## 2023-06-01 PROCEDURE — 76816 OB US FOLLOW-UP PER FETUS: CPT

## 2023-06-02 ENCOUNTER — ANCILLARY PROCEDURE (OUTPATIENT)
Dept: ULTRASOUND IMAGING | Facility: HOSPITAL | Age: 38
End: 2023-06-02
Attending: OBSTETRICS & GYNECOLOGY
Payer: COMMERCIAL

## 2023-06-02 ENCOUNTER — OFFICE VISIT (OUTPATIENT)
Dept: MATERNAL FETAL MEDICINE | Facility: HOSPITAL | Age: 38
End: 2023-06-02
Attending: OBSTETRICS & GYNECOLOGY
Payer: COMMERCIAL

## 2023-06-02 DIAGNOSIS — O24.414 INSULIN CONTROLLED GESTATIONAL DIABETES MELLITUS (GDM) IN THIRD TRIMESTER: Primary | ICD-10-CM

## 2023-06-02 DIAGNOSIS — O99.213 OBESITY AFFECTING PREGNANCY IN THIRD TRIMESTER: ICD-10-CM

## 2023-06-02 DIAGNOSIS — O24.113 TYPE 2 DIABETES MELLITUS COMPLICATING PREGNANCY IN THIRD TRIMESTER, ANTEPARTUM: ICD-10-CM

## 2023-06-02 PROCEDURE — 76816 OB US FOLLOW-UP PER FETUS: CPT | Mod: 26 | Performed by: STUDENT IN AN ORGANIZED HEALTH CARE EDUCATION/TRAINING PROGRAM

## 2023-06-02 PROCEDURE — 76819 FETAL BIOPHYS PROFIL W/O NST: CPT | Mod: 26 | Performed by: STUDENT IN AN ORGANIZED HEALTH CARE EDUCATION/TRAINING PROGRAM

## 2023-06-02 PROCEDURE — 76816 OB US FOLLOW-UP PER FETUS: CPT

## 2023-06-02 PROCEDURE — 99207 PR NO CHARGE LOS: CPT | Performed by: STUDENT IN AN ORGANIZED HEALTH CARE EDUCATION/TRAINING PROGRAM

## 2023-06-02 NOTE — NURSING NOTE
Patient reports not noticing movement this morning but has been normal fetal movement, no pain, no contractions, leaking of fluid, or bleeding.  Reports blood sugar values fasting elevated this morning because she ran out of insulin last night- plans to get more today.  Patient denies headache, visual changes, nausea/vomiting, epigastric pain related to preeclampsia. SBAR given to SHUN URENA, see their note in Epic.

## 2023-06-05 ENCOUNTER — OFFICE VISIT (OUTPATIENT)
Dept: MATERNAL FETAL MEDICINE | Facility: HOSPITAL | Age: 38
End: 2023-06-05
Attending: OBSTETRICS & GYNECOLOGY
Payer: COMMERCIAL

## 2023-06-05 ENCOUNTER — TRANSFERRED RECORDS (OUTPATIENT)
Dept: HEALTH INFORMATION MANAGEMENT | Facility: CLINIC | Age: 38
End: 2023-06-05

## 2023-06-05 ENCOUNTER — ANCILLARY PROCEDURE (OUTPATIENT)
Dept: ULTRASOUND IMAGING | Facility: HOSPITAL | Age: 38
End: 2023-06-05
Attending: OBSTETRICS & GYNECOLOGY
Payer: COMMERCIAL

## 2023-06-05 DIAGNOSIS — O24.414 INSULIN CONTROLLED GESTATIONAL DIABETES MELLITUS (GDM) IN THIRD TRIMESTER: Primary | ICD-10-CM

## 2023-06-05 DIAGNOSIS — O24.113 TYPE 2 DIABETES MELLITUS COMPLICATING PREGNANCY IN THIRD TRIMESTER, ANTEPARTUM: ICD-10-CM

## 2023-06-05 DIAGNOSIS — O99.213 OBESITY AFFECTING PREGNANCY IN THIRD TRIMESTER: ICD-10-CM

## 2023-06-05 PROCEDURE — 76819 FETAL BIOPHYS PROFIL W/O NST: CPT

## 2023-06-05 PROCEDURE — 99207 PR NO CHARGE LOS: CPT | Performed by: OBSTETRICS & GYNECOLOGY

## 2023-06-05 PROCEDURE — 76819 FETAL BIOPHYS PROFIL W/O NST: CPT | Mod: 26 | Performed by: OBSTETRICS & GYNECOLOGY

## 2023-06-05 NOTE — NURSING NOTE
Teetee Alvarado is a  at 34w5d who presents to [facility] for BPP. Pt reports positive fetal movement. Pt denies bldg/lof/change in discharge, contractions, headache, vision changes, chest pain/SOB or edema. SBAR given to Dr. Vernon, see note in Epic.

## 2023-06-08 ENCOUNTER — ALLIED HEALTH/NURSE VISIT (OUTPATIENT)
Dept: EDUCATION SERVICES | Facility: CLINIC | Age: 38
End: 2023-06-08
Payer: COMMERCIAL

## 2023-06-08 ENCOUNTER — OFFICE VISIT (OUTPATIENT)
Dept: ENDOCRINOLOGY | Facility: CLINIC | Age: 38
End: 2023-06-08
Payer: COMMERCIAL

## 2023-06-08 ENCOUNTER — PRENATAL OFFICE VISIT (OUTPATIENT)
Dept: FAMILY MEDICINE | Facility: CLINIC | Age: 38
End: 2023-06-08
Payer: COMMERCIAL

## 2023-06-08 VITALS
HEART RATE: 116 BPM | SYSTOLIC BLOOD PRESSURE: 120 MMHG | DIASTOLIC BLOOD PRESSURE: 60 MMHG | OXYGEN SATURATION: 96 % | BODY MASS INDEX: 48.21 KG/M2 | WEIGHT: 287.5 LBS

## 2023-06-08 VITALS
BODY MASS INDEX: 47.48 KG/M2 | SYSTOLIC BLOOD PRESSURE: 111 MMHG | DIASTOLIC BLOOD PRESSURE: 66 MMHG | WEIGHT: 285 LBS | HEIGHT: 65 IN | OXYGEN SATURATION: 98 % | HEART RATE: 93 BPM

## 2023-06-08 DIAGNOSIS — O09.513 HIGH-RISK PREGNANCY, PRIMIGRAVIDA OF ADVANCED MATERNAL AGE IN THIRD TRIMESTER: Primary | ICD-10-CM

## 2023-06-08 DIAGNOSIS — F32.A ANXIETY AND DEPRESSION: ICD-10-CM

## 2023-06-08 DIAGNOSIS — O24.414 INSULIN CONTROLLED GESTATIONAL DIABETES MELLITUS (GDM) IN SECOND TRIMESTER: ICD-10-CM

## 2023-06-08 DIAGNOSIS — O24.813 OTHER PRE-EXISTING DIABETES MELLITUS DURING PREGNANCY IN THIRD TRIMESTER: Primary | ICD-10-CM

## 2023-06-08 DIAGNOSIS — F41.9 ANXIETY AND DEPRESSION: ICD-10-CM

## 2023-06-08 DIAGNOSIS — E66.01 MORBID OBESITY (H): ICD-10-CM

## 2023-06-08 DIAGNOSIS — R76.8 HSV-2 SEROPOSITIVE: ICD-10-CM

## 2023-06-08 DIAGNOSIS — O24.813 OTHER PRE-EXISTING DIABETES MELLITUS DURING PREGNANCY IN THIRD TRIMESTER: ICD-10-CM

## 2023-06-08 DIAGNOSIS — F90.0 ATTENTION DEFICIT HYPERACTIVITY DISORDER (ADHD), PREDOMINANTLY INATTENTIVE TYPE: ICD-10-CM

## 2023-06-08 DIAGNOSIS — O24.414 INSULIN CONTROLLED GESTATIONAL DIABETES MELLITUS (GDM) IN THIRD TRIMESTER: Primary | ICD-10-CM

## 2023-06-08 DIAGNOSIS — Z86.19 HISTORY OF HERPES GENITALIS: ICD-10-CM

## 2023-06-08 DIAGNOSIS — O09.522 MULTIGRAVIDA OF ADVANCED MATERNAL AGE IN SECOND TRIMESTER: ICD-10-CM

## 2023-06-08 PROCEDURE — 99207 PR COMPLICATED OB VISIT: CPT | Performed by: FAMILY MEDICINE

## 2023-06-08 PROCEDURE — 99214 OFFICE O/P EST MOD 30 MIN: CPT | Performed by: NURSE PRACTITIONER

## 2023-06-08 PROCEDURE — G0108 DIAB MANAGE TRN  PER INDIV: HCPCS

## 2023-06-08 RX ORDER — ACYCLOVIR 400 MG/1
400 TABLET ORAL EVERY 8 HOURS
Qty: 120 TABLET | Refills: 0 | Status: ON HOLD | OUTPATIENT
Start: 2023-06-08 | End: 2023-06-30

## 2023-06-08 NOTE — PROGRESS NOTES
"[facility]  ENDOCRINOLOGY    Gestational Diabetes 2023    Teetee Alvarado, 1985, [id number]          Reason for visit      1. Insulin controlled gestational diabetes mellitus (GDM) in third trimester        HPI     Teetee Alvarado is a very pleasant 38 year old old female who presents for GESTATIONAL Diabetes Mellitus.  She is currently 35w2d  pregnant . Due date is 23.  Diagnosed with GDM based on an OGTT. She hasnot had  GDM in prior pregnancies.   Current carbohydrate intake:consistent with recommendations of 30g-60g-60g.  I have reviewed her blood glucose logs and note that the:  Fasting readings  are:in range on current regimen  Postprandial readings are:in range on current regimen  Current Levemir dose: 46  Current Prandial insulin:42/32/42  Blood glucose logs/meter brought in and data reviewed and incorporated into decision-making.  [facility]  Therapy/Interventions in the past:  She has been seen by the Diabetes Educator- and has received instruction on carbohydrate counting and  consistency.  Records from referring provider and other sources have also been reviewed and incorporated into decision-making.      TODAY:    Teetee is here in f/u for GDM. We are joined by LISA Hein. Pt provides BG in the way of the Robert download. She is still having elevations postprandially, but things have improved.  On , she eats a very high protein lunch with Barbecue sauce and then goes low. Last night she forgot to take her insulin, and the night before she had a very high CHO \"\" from [employer]. Baby is moving appropriately and she is having no swelling. She reports that baby is currently 6 lbs. She is not being told that it is measuring ahead. Postpartum instructions given today and all questions answered to her satisfaction. She will call with problems or concerns prior to delivery.     Past Medical History       Patient Active Problem List   Diagnosis     " Attention deficit hyperactivity disorder (ADHD), predominantly inattentive type     PCOS (polycystic ovarian syndrome)     Anxiety and depression     Anal fistula     Gastroesophageal reflux disease     HSV-2 seropositive     Morbid obesity (H)     Prediabetes     Multigravida of advanced maternal age in second trimester     Elevated glucose tolerance test     Insulin controlled gestational diabetes mellitus (GDM) in third trimester     High-risk pregnancy, primigravida of advanced maternal age in third trimester        Past Surgical History     Past Surgical History:   Procedure Laterality Date     ABSCESS DRAINAGE Left     left leg     LASIK       WISDOM TOOTH EXTRACTION         Family History     Family History   Problem Relation Age of Onset     Diabetes Type 2  Father      Arthritis Father        Social History     Social History     Tobacco Use     Smoking status: Every Day     Types: Vaping Device     Smokeless tobacco: Never     Tobacco comments:     vaping-daily   Substance Use Topics     Alcohol use: Not Currently     Drug use: Yes     Frequency: 7.0 times per week     Types: Marijuana     Comment: Drug use: daily       Review of Systems     Patient has no polyuria or polydipsia, no chest pain, dyspnea or TIA's, no numbness, tingling or pain in extremities  Remainder negative except as noted in HPI.  Answers for HPI/ROS submitted by the patient on 5/25/2023  General Symptoms: Yes  Skin Symptoms: No  HENT Symptoms: Yes  EYE SYMPTOMS: No  HEART SYMPTOMS: Yes  LUNG SYMPTOMS: Yes  INTESTINAL SYMPTOMS: Yes  URINARY SYMPTOMS: No  GYNECOLOGIC SYMPTOMS: No  BREAST SYMPTOMS: Yes  SKELETAL SYMPTOMS: Yes  BLOOD SYMPTOMS: No  NERVOUS SYSTEM SYMPTOMS: Yes  MENTAL HEALTH SYMPTOMS: Yes  Ear pain: No  Ear discharge: No  Hearing loss: No  Tinnitus: No  Nosebleeds: No  Congestion: No  Sinus pain: No  Trouble swallowing: No   Voice hoarseness: No  Mouth sores: No  Sore throat: No  Tooth pain: No  Gum tenderness: No  Bleeding  gums: Yes  Change in taste: No  Change in sense of smell: No  Dry mouth: No  Hearing aid used: No  Neck lump: No  Fever: No  Loss of appetite: No  Weight loss: No  Weight gain: Yes  Fatigue: Yes  Night sweats: No  Chills: No  Increased stress: No  Excessive hunger: Yes  Excessive thirst: No  Feeling hot or cold when others believe the temperature is normal: Yes  Loss of height: No  Post-operative complications: No  Surgical site pain: No  Hallucinations: No  Change in or Loss of Energy: Yes  Hyperactivity: No  Confusion: No  Chest pain or pressure: No  Fast or irregular heartbeat: No  Pain in legs with walking: No  Trouble breathing while lying down: Yes  Fingers or toes appear blue: No  High blood pressure: No  Low blood pressure: No  Fainting: No  Murmurs: No  Pacemaker: No  Varicose veins: No  Edema or swelling: Yes  Wake up at night with shortness of breath: No  Light-headedness: No  Exercise intolerance: Yes  Cough: No  Sputum or phlegm: No  Coughing up blood: No  Difficulty breating or shortness of breath: No  Snoring: Yes  Wheezing: No  Difficulty breathing on exertion: Yes  Nighttime Cough: No  Difficulty breathing when lying flat: Yes  Heart burn or indigestion: Yes  Nausea: No  Vomiting: No  Abdominal pain: No  Bloating: No  Constipation: No  Diarrhea: No  Blood in stool: No  Black stools: No  Rectal or Anal pain: No  Fecal incontinence: No  Yellowing of skin or eyes: No  Vomit with blood: No  Change in stools: No  Discharge: Yes  Lumps: No  Pain: Yes  Nipple retraction: No  Back pain: Yes  Muscle aches: Yes  Neck pain: No  Swollen joints: No  Joint pain: Yes  Bone pain: Yes  Muscle cramps: Yes  Muscle weakness: No  Joint stiffness: No  Bone fracture: No  Trouble with coordination: Yes  Dizziness or trouble with balance: No  Fainting or black-out spells: No  Memory loss: No  Headache: No  Seizures: No  Speech problems: No  Tingling: No  Tremor: No  Weakness: No  Difficulty walking: No  Paralysis:  No  Numbness: Yes  Nervous or Anxious: No  Depression: No  Trouble sleeping: Yes  Trouble thinking or concentrating: Yes  Mood changes: No  Panic attacks: No        Vital Signs     /60 (BP Location: Right arm, Patient Position: Sitting, Cuff Size: Adult Large)   Pulse 116   Wt 130.4 kg (287 lb 8 oz)   LMP  (LMP Unknown)   SpO2 96%   BMI 48.21 kg/m    Wt Readings from Last 3 Encounters:   23 130.4 kg (287 lb 8 oz)   23 129.3 kg (285 lb)   23 129 kg (284 lb 6.4 oz)       Physical Exam     GENERAL: Pleasant, alert, appropriate appearance for age. No acute distress,   HEENT: Normocephalic, atraumatic  NECK: normal in appearance  CHEST/RESPIRATORY: Normal chest wall and respirations.   ABDOMEN: Gravid   SKIN: No melanosis,  ecchymosis,  vitiligo. No acanthosis nigricans  NEURO:  Non-focal, no tremor.  PSYCH: Alert and oriented -appropriate affect. Orientation, judgement and memory appear intact.  MSK: No joint abnormalities, FROM in all four extremities. No kyphosis    Assessment     1. Insulin controlled gestational diabetes mellitus (GDM) in third trimester        Plan     1. GESTATIONAL DIABETES-  Adjust dose as follows:     -Levemir [id number]   units. Increase by 2 units every 2 days to keep fasting blood glucose below 95mg/dL  -Novolog 42  units with breakfast  -Novolog 32 units with lunch   -Novolog 42 units with dinner     -Increase by 2 units every 2 days to keep 1 hour after meal blood glucose less than 140mg/dL     We reviewed glucose goals of fasting blood glucose <95 mg/dL and 1 hour post prandial blood glucose of <140 mg/dL.    Monitor blood sugar 4 times daily: Fasting  and 1 hour after each meal.  Contact  this clinic [phone] if blood glucose is not within the above-mentioned goals.      We discussed the importance of excellent glycemic control during pregnancy to limit complications such as fetal macrosomia, shoulder dystocia,  hypoglycemia and  hyperbilirubinemia.  I have discussed the patient's increased risk of recurrent GDM and/or development of type 2 diabetes later in life.         F/up with A1c 3 months postpartum with PCP.    May be a candidate for metformin postpartum as she has more than 1 risk factor for future Type 2 Diabetes Mellitus.    She will need a screening A1c at least annually, TLC- including carbohydrate control and exercise.    After you deliver the baby, it is suggested to check your blood sugar occasionally (1 - 2 times per week) for 6 weeks.     When you are not pregnant, normal blood sugars are:       Fasting (before eating anything in the morning)  - under 100 mg/dl    2 hours after meals under 140  The American Diabetes Association recommends:     a glucose tolerance test 6 weeks after you deliver the baby.         a hemoglobin Alc test yearly after delivery.  The Alc test is a 2-3 month average blood sugar reading.        Discuss getting these tests with your provider.       After delivery, and your provider has said that it is safe to be active, work toward getting 150 minutes each week of physical activity to decrease your risk of  getting type 2 diabetes.       Maintaining a healthy body weight will also decrease your risk of getting type 2 diabetes.         Key Sahu NP  6/8/2023    Lab Results     Microalbumin Urine mg/dL   Date Value Ref Range Status   05/19/2021 1.03 0.00 - 1.99 mg/dL Final       Cholesterol   Date Value Ref Range Status   11/03/2022 155 <200 mg/dL Final     Direct Measure HDL   Date Value Ref Range Status   11/03/2022 47 (L) >=50 mg/dL Final     Triglycerides   Date Value Ref Range Status   11/03/2022 99 <150 mg/dL Final             Current Medications

## 2023-06-08 NOTE — PATIENT INSTRUCTIONS
Add growth ultrasound to BPP early the week of June 26th. Make sure twice weekly BPPs are scheduled through 39 weeks.    [facility] Information  If you have any further concerns or wish to schedule another appointment, please call our office at [phone].  Call your doctor if you experience any bleeding or abdominal cramping early in pregnancy.     For uncomplicated pregnancies, you will be seeing your doctor once a month until you are 28 weeks, then you will see your doctor twice a month. You will begin weekly visits at 36 weeks until you deliver.     If you have a medical emergency, please call [phone].    [facility] Labor and Delivery: [phone]  [facility] Labor and Delivery: [phone]    When to call or come in to the hospital  If you notice a decrease in your baby's movement.   If your begin to experience contractions that are 5 minutes or less apart and lasting for over 45 seconds, or if contractions are becoming more painful.  If you have any bleeding or leakage of fluids.   If you have a headache not resolved with tylenol, right upper abdominal pain, or sudden onset of swelling.  You know your body best. Never hesitate to call or go to the hospital if something doesn't feel right!    Preparing for the hospital  You re likely feeling anxious as your child s birth approaches. This is normal. To give yourself some peace of mind, pack a bag 3-4 weeks before your due date. Here is a list of things to remember:  Personal care items like toothbrush, hair brush, lip balm, lotion, shampoo, glasses, contacts  Nightgown, bathrobe, slippers  Several pairs of underwear  Comfortable clothes for you to wear home  Camera with new batteries  Cell phone and   Insurance information and any other paperwork needed for your hospital stay  Clothes for baby to wear home  An infant, rear-facing car seat for bringing home your baby (this is required by law)  Managing Labor Pain   There are many ways to manage  "pain during labor. It can often be done with no anesthesia or strong pain medications. Talk to your health care provider about any options you would like to explore.   Help from Relaxation  Some of these are learned in special classes. Your health care provider can help you find classes. The [facility] has a tub you can use during early labor. These methods may be of help to you:   Breathing techniques   A warm tub between contractions   Massage and therapeutic touch by your support person or labor    Reading materials that are comforting or inspiring   Music that is soothing   Hypnosis   Acupuncture and acupressure   Heat and cold applicatio  Help From Analgesics   Analgesics are mild medications that reduce pain. They can be used along with some relaxation methods. They can give you pain relief without total loss of feeling. They may lessen the pain of strong contractions. You may feel well enough to nap between contractions. They have little effect on your baby if given early in labor. This may be done by injection or by IV.   Help from Inhaled Medications  Nitrous oxide (\"laughing gas\") mixed with oxygen is another option for anesthesia. This is administered by breathing through a mask that you can take off or put on when you would like. It is safe for you and your baby during labor. Women have various responses to pain relief from this method, typically you will feel less bothered by the pain.   Help From Anesthetics   Anesthesia involves blockage of all feeling including pain. It can be given in the form of local anesthesia or general anesthesia.  Anesthesia is a type of medication to prevent pain. It is often used in labor. It may numb only one region of your body. This is called regional anesthesia. Or it may let you sleep during surgery. This is called general anesthesia. This type of medication is given by a trained specialist. When possible, regional anesthesia will be used. This is so you can be awake " during your baby s birth.   Regional Anesthesia   Regional anesthesia may be used to numb your lower body for a vaginal or  birth. It does not go into your bloodstream. This means that little or none of it will reach your baby. There are two kinds:   Epidural. This is most often given while you sit up or lie on your side. A needle with a flexible tube (catheter) is put in your lower back. The needle is then removed. The anesthetic is sent through the catheter. A pump may be attached. This gives you a constant level of anesthetic. An epidural often only partly affects muscle control. This means you should still be able to push for a vaginal birth.   Spinal. This is most often given in one dose right before delivery. It acts fast. You may sit up or lie down when it is injected. It may affect muscle control in your lower body. This includes the ability to push.  General Anesthesia   General anesthesia lets you sleep and keeps you free of pain during surgery. It may be used for a . It may be given as an injection. It may be given as an inhaled gas. Or it may be given as both. Delivery often occurs before the medication has reached the baby.

## 2023-06-08 NOTE — LETTER
6/8/2023         RE: Teetee Alvarado  [address]  [place] [address]        Dear Colleague,    Thank you for referring your patient, Teetee Alvarado, to the [facility]. Please see a copy of my visit note below.    Diabetes Self-Management and Care Support    Teetee is here alone today for her follow up on Gestational Diabetes.  She is currently taking insulin to help control her glucose.  She is feeling pretty good.  Stated she is not having swelling in her hands, not having swelling in her feet and her baby is moving a lot.  Teetee saw her OB today and saw told things are looking good.    OB-Rosa Frank  EDC-7.12.23-35 weeks 1 day  Pregnancy number-1  OGTT-129/221/182/155  Previous GDM-No    Current insulin doses  Basal Levemir 46 units  Prandial Novolog 42/32/42    Current blood sugars      She did miss her insulin last night, resulting in higher readings this morning.  Tuesday June 6th she had a  after dinner causing glucose to rise to over 200.      Plan-  Continue with current doses.  Increase by 2 units every 2 times reading is above goal.Call if there is any change in readings before next appointment.  Follow up scheduled for 2 weeks with endocrinology       The service provided today was under the supervising provider, Key Sahu, who was available if needed.     Melyssa Baker RN, [facility]  [phone]  In Clinic Tuesday, Wednesday, Thursday  In person   DSMT 30 minutes

## 2023-06-08 NOTE — LETTER
"    2023         RE: Teetee Alvarado  [address]  [place] [address]        Dear Colleague,    Thank you for referring your patient, Teetee Alvarado, to the [facility]. Please see a copy of my visit note below.    [facility]  ENDOCRINOLOGY    Gestational Diabetes 2023    Teetee Alvarado, 1985, [id number]          Reason for visit      1. Insulin controlled gestational diabetes mellitus (GDM) in third trimester        HPI     Teetee Alvarado is a very pleasant 38 year old old female who presents for GESTATIONAL Diabetes Mellitus.  She is currently 35w2d  pregnant . Due date is 23.  Diagnosed with GDM based on an OGTT. She hasnot had  GDM in prior pregnancies.   Current carbohydrate intake:consistent with recommendations of 30g-60g-60g.  I have reviewed her blood glucose logs and note that the:  Fasting readings  are:in range on current regimen  Postprandial readings are:in range on current regimen  Current Levemir dose: 46  Current Prandial insulin:42/32/42  Blood glucose logs/meter brought in and data reviewed and incorporated into decision-making.  [facility]  Therapy/Interventions in the past:  She has been seen by the Diabetes Educator- and has received instruction on carbohydrate counting and  consistency.  Records from referring provider and other sources have also been reviewed and incorporated into decision-making.      TODAY:    Teetee is here in f/u for GDM. We are joined by LISA Hein. Pt provides BG in the way of the Robert download. She is still having elevations postprandially, but things have improved.  On , she eats a very high protein lunch with Barbecue sauce and then goes low. Last night she forgot to take her insulin, and the night before she had a very high CHO \"\" from [employer]. Baby is moving appropriately and she is having no swelling. She reports that baby is currently 6 lbs. She is " not being told that it is measuring ahead. Postpartum instructions given today and all questions answered to her satisfaction. She will call with problems or concerns prior to delivery.     Past Medical History       Patient Active Problem List   Diagnosis     Attention deficit hyperactivity disorder (ADHD), predominantly inattentive type     PCOS (polycystic ovarian syndrome)     Anxiety and depression     Anal fistula     Gastroesophageal reflux disease     HSV-2 seropositive     Morbid obesity (H)     Prediabetes     Multigravida of advanced maternal age in second trimester     Elevated glucose tolerance test     Insulin controlled gestational diabetes mellitus (GDM) in third trimester     High-risk pregnancy, primigravida of advanced maternal age in third trimester        Past Surgical History     Past Surgical History:   Procedure Laterality Date     ABSCESS DRAINAGE Left     left leg     LASIK       WISDOM TOOTH EXTRACTION         Family History     Family History   Problem Relation Age of Onset     Diabetes Type 2  Father      Arthritis Father        Social History     Social History     Tobacco Use     Smoking status: Every Day     Types: Vaping Device     Smokeless tobacco: Never     Tobacco comments:     vaping-daily   Substance Use Topics     Alcohol use: Not Currently     Drug use: Yes     Frequency: 7.0 times per week     Types: Marijuana     Comment: Drug use: daily       Review of Systems     Patient has no polyuria or polydipsia, no chest pain, dyspnea or TIA's, no numbness, tingling or pain in extremities  Remainder negative except as noted in HPI.  Answers for HPI/ROS submitted by the patient on 5/25/2023  General Symptoms: Yes  Skin Symptoms: No  HENT Symptoms: Yes  EYE SYMPTOMS: No  HEART SYMPTOMS: Yes  LUNG SYMPTOMS: Yes  INTESTINAL SYMPTOMS: Yes  URINARY SYMPTOMS: No  GYNECOLOGIC SYMPTOMS: No  BREAST SYMPTOMS: Yes  SKELETAL SYMPTOMS: Yes  BLOOD SYMPTOMS: No  NERVOUS SYSTEM SYMPTOMS: Yes  MENTAL  HEALTH SYMPTOMS: Yes  Ear pain: No  Ear discharge: No  Hearing loss: No  Tinnitus: No  Nosebleeds: No  Congestion: No  Sinus pain: No  Trouble swallowing: No   Voice hoarseness: No  Mouth sores: No  Sore throat: No  Tooth pain: No  Gum tenderness: No  Bleeding gums: Yes  Change in taste: No  Change in sense of smell: No  Dry mouth: No  Hearing aid used: No  Neck lump: No  Fever: No  Loss of appetite: No  Weight loss: No  Weight gain: Yes  Fatigue: Yes  Night sweats: No  Chills: No  Increased stress: No  Excessive hunger: Yes  Excessive thirst: No  Feeling hot or cold when others believe the temperature is normal: Yes  Loss of height: No  Post-operative complications: No  Surgical site pain: No  Hallucinations: No  Change in or Loss of Energy: Yes  Hyperactivity: No  Confusion: No  Chest pain or pressure: No  Fast or irregular heartbeat: No  Pain in legs with walking: No  Trouble breathing while lying down: Yes  Fingers or toes appear blue: No  High blood pressure: No  Low blood pressure: No  Fainting: No  Murmurs: No  Pacemaker: No  Varicose veins: No  Edema or swelling: Yes  Wake up at night with shortness of breath: No  Light-headedness: No  Exercise intolerance: Yes  Cough: No  Sputum or phlegm: No  Coughing up blood: No  Difficulty breating or shortness of breath: No  Snoring: Yes  Wheezing: No  Difficulty breathing on exertion: Yes  Nighttime Cough: No  Difficulty breathing when lying flat: Yes  Heart burn or indigestion: Yes  Nausea: No  Vomiting: No  Abdominal pain: No  Bloating: No  Constipation: No  Diarrhea: No  Blood in stool: No  Black stools: No  Rectal or Anal pain: No  Fecal incontinence: No  Yellowing of skin or eyes: No  Vomit with blood: No  Change in stools: No  Discharge: Yes  Lumps: No  Pain: Yes  Nipple retraction: No  Back pain: Yes  Muscle aches: Yes  Neck pain: No  Swollen joints: No  Joint pain: Yes  Bone pain: Yes  Muscle cramps: Yes  Muscle weakness: No  Joint stiffness: No  Bone  fracture: No  Trouble with coordination: Yes  Dizziness or trouble with balance: No  Fainting or black-out spells: No  Memory loss: No  Headache: No  Seizures: No  Speech problems: No  Tingling: No  Tremor: No  Weakness: No  Difficulty walking: No  Paralysis: No  Numbness: Yes  Nervous or Anxious: No  Depression: No  Trouble sleeping: Yes  Trouble thinking or concentrating: Yes  Mood changes: No  Panic attacks: No        Vital Signs     /60 (BP Location: Right arm, Patient Position: Sitting, Cuff Size: Adult Large)   Pulse 116   Wt 130.4 kg (287 lb 8 oz)   LMP  (LMP Unknown)   SpO2 96%   BMI 48.21 kg/m    Wt Readings from Last 3 Encounters:   06/08/23 130.4 kg (287 lb 8 oz)   06/08/23 129.3 kg (285 lb)   05/25/23 129 kg (284 lb 6.4 oz)       Physical Exam     GENERAL: Pleasant, alert, appropriate appearance for age. No acute distress,   HEENT: Normocephalic, atraumatic  NECK: normal in appearance  CHEST/RESPIRATORY: Normal chest wall and respirations.   ABDOMEN: Gravid   SKIN: No melanosis,  ecchymosis,  vitiligo. No acanthosis nigricans  NEURO:  Non-focal, no tremor.  PSYCH: Alert and oriented -appropriate affect. Orientation, judgement and memory appear intact.  MSK: No joint abnormalities, FROM in all four extremities. No kyphosis    Assessment     1. Insulin controlled gestational diabetes mellitus (GDM) in third trimester        Plan     1. GESTATIONAL DIABETES-  Adjust dose as follows:     -Levemir kgardml33   units. Increase by 2 units every 2 days to keep fasting blood glucose below 95mg/dL  -Novolog 42  units with breakfast  -Novolog 32 units with lunch   -Novolog 42 units with dinner     -Increase by 2 units every 2 days to keep 1 hour after meal blood glucose less than 140mg/dL     We reviewed glucose goals of fasting blood glucose <95 mg/dL and 1 hour post prandial blood glucose of <140 mg/dL.    Monitor blood sugar 4 times daily: Fasting  and 1 hour after each meal.  Contact  this clinic  [phone] if blood glucose is not within the above-mentioned goals.      We discussed the importance of excellent glycemic control during pregnancy to limit complications such as fetal macrosomia, shoulder dystocia,  hypoglycemia and hyperbilirubinemia.  I have discussed the patient's increased risk of recurrent GDM and/or development of type 2 diabetes later in life.         F/up with A1c 3 months postpartum with PCP.    May be a candidate for metformin postpartum as she has more than 1 risk factor for future Type 2 Diabetes Mellitus.    She will need a screening A1c at least annually, TLC- including carbohydrate control and exercise.    After you deliver the baby, it is suggested to check your blood sugar occasionally (1 - 2 times per week) for 6 weeks.     When you are not pregnant, normal blood sugars are:       Fasting (before eating anything in the morning)  - under 100 mg/dl    2 hours after meals under 140  The American Diabetes Association recommends:     a glucose tolerance test 6 weeks after you deliver the baby.         a hemoglobin Alc test yearly after delivery.  The Alc test is a 2-3 month average blood sugar reading.        Discuss getting these tests with your provider.       After delivery, and your provider has said that it is safe to be active, work toward getting 150 minutes each week of physical activity to decrease your risk of  getting type 2 diabetes.       Maintaining a healthy body weight will also decrease your risk of getting type 2 diabetes.         Key Sahu NP  2023    Lab Results     Microalbumin Urine mg/dL   Date Value Ref Range Status   2021 1.03 0.00 - 1.99 mg/dL Final       Cholesterol   Date Value Ref Range Status   2022 155 <200 mg/dL Final     Direct Measure HDL   Date Value Ref Range Status   2022 47 (L) >=50 mg/dL Final     Triglycerides   Date Value Ref Range Status   2022 99 <150 mg/dL Final             Current Medications                  Again, thank you for allowing me to participate in the care of your patient.        Sincerely,        Key Sahu NP

## 2023-06-08 NOTE — PROGRESS NOTES
"Teetee Alvarado is a 38 year old  female who presents to clinic for a follow up OB visit. She is currently 35w1d with an estimated date of delivery of 2023 via 22-week ultrasound. She denies headache, chest pain, shortness of breath, abdominal pain/contractions, vaginal bleeding, or abnormal discharge. She continues to feel baby move.     New concerns today: sees endo again today.  Levemir now at 46 units nightly, NovoLog 42 units for breakfast and dinner, 32 units for lunch.  No recent lows. Feet swelling.     OB History    Para Term  AB Living   1 0 0 0 0 0   SAB IAB Ectopic Multiple Live Births   0 0 0 0 0      # Outcome Date GA Lbr Jose F/2nd Weight Sex Delivery Anes PTL Lv   1 Current                Physical exam:  /66   Pulse 93   Ht 1.645 m (5' 4.75\")   Wt 129.3 kg (285 lb)   LMP  (LMP Unknown)   SpO2 98%   BMI 47.79 kg/m      General: alert, female in no acute distress  Abdomen: gravid uterus appropriate for gestation age at 37 cm above pubic symphysis, non-tender, FHTs 145  Extremities: Trace edema bilateral ankles    Prenatal labs  Blood type: A+  Hgb: 13.6-->13.1  Pap: Will need postpartum  Hep B, HIV, syphilisx2, gonorrhea, chlamydia, HIV negative  Rubella immune  UA/Ucx normal  Tdap 2023  Plans for epidural  Boy! Desires circumcision    Assessment/Plan:  High-risk pregnancy, primigravida of advanced maternal age in third trimester  On daily baby aspirin for preeclampsia prophylaxis which will be stopped prior to planned induction.  GBS swab next visit.     Morbid obesity (H)  Body mass index is 47.79 kg/m . TWG 10 lbs. Recommended weight gain throughout pregnancy 11 to 20 pounds.        Insulin controlled gestational diabetes mellitus (GDM) in third trimester  Prediabetes  Following with endocrinology/diabetes education requiring high doses of basal/bolus insulin for GDM.  BPP's have been reassuring. LGA infant noted at 30-week ultrasound, though last growth " ultrasound 6/1/2023 EFW 79 percentile, 2631 g.  Induction planned for 39 weeks due to maternal BMI greater than 40.  Plan additional growth ultrasound ordered in 4 weeks.     HSV-2 seropositive  Will need HSV prophylaxis at 36 weeks.  Acyclovir prescription sent 400 mg 3 times daily 36 weeks until delivery.     Attention deficit hyperactivity disorder (ADHD), predominantly inattentive type  No longer on stimulant medication due to pregnancy.     Anxiety and depression  Stable off sertraline, monitor for labile mood and postpartum depression.     Follow up in 1 week for routine prenatal visit.     Rosa Frank DO

## 2023-06-09 ENCOUNTER — OFFICE VISIT (OUTPATIENT)
Dept: MATERNAL FETAL MEDICINE | Facility: HOSPITAL | Age: 38
End: 2023-06-09
Attending: OBSTETRICS & GYNECOLOGY
Payer: COMMERCIAL

## 2023-06-09 ENCOUNTER — ANCILLARY PROCEDURE (OUTPATIENT)
Dept: ULTRASOUND IMAGING | Facility: HOSPITAL | Age: 38
End: 2023-06-09
Attending: OBSTETRICS & GYNECOLOGY
Payer: COMMERCIAL

## 2023-06-09 DIAGNOSIS — O24.113 TYPE 2 DIABETES MELLITUS COMPLICATING PREGNANCY IN THIRD TRIMESTER, ANTEPARTUM: ICD-10-CM

## 2023-06-09 DIAGNOSIS — O99.213 OBESITY AFFECTING PREGNANCY IN THIRD TRIMESTER: ICD-10-CM

## 2023-06-09 DIAGNOSIS — O24.414 INSULIN CONTROLLED GESTATIONAL DIABETES MELLITUS (GDM) IN THIRD TRIMESTER: Primary | ICD-10-CM

## 2023-06-09 PROCEDURE — 76819 FETAL BIOPHYS PROFIL W/O NST: CPT | Mod: 26 | Performed by: OBSTETRICS & GYNECOLOGY

## 2023-06-09 PROCEDURE — 99207 PR NO CHARGE LOS: CPT | Performed by: OBSTETRICS & GYNECOLOGY

## 2023-06-09 PROCEDURE — 76819 FETAL BIOPHYS PROFIL W/O NST: CPT

## 2023-06-09 NOTE — PATIENT INSTRUCTIONS
1. GESTATIONAL DIABETES-  Adjust dose as follows:     -Levemir blelere38   units. Increase by 2 units every 2 days to keep fasting blood glucose below 95mg/dL  -Novolog 42  units with breakfast  -Novolog 32 units with lunch   -Novolog 42 units with dinner     -Increase by 2 units every 2 days to keep 1 hour after meal blood glucose less than 140mg/dL     We reviewed glucose goals of fasting blood glucose <95 mg/dL and 1 hour post prandial blood glucose of <140 mg/dL.    Monitor blood sugar 4 times daily: Fasting  and 1 hour after each meal.  Contact  this clinic [phone] if blood glucose is not within the above-mentioned goals.      We discussed the importance of excellent glycemic control during pregnancy to limit complications such as fetal macrosomia, shoulder dystocia,  hypoglycemia and hyperbilirubinemia.  I have discussed the patient's increased risk of recurrent GDM and/or development of type 2 diabetes later in life.       F/up with A1c 3 months postpartum with PCP.  May be a candidate for metformin postpartum as she has more than 1 risk factor for future Type 2 Diabetes Mellitus.  She will need a screening A1c at least annually, TLC- including carbohydrate control and exercise.    After you deliver the baby, it is suggested to check your blood sugar occasionally (1 - 2 times per week) for 6 weeks.   When you are not pregnant, normal blood sugars are:     Fasting (before eating anything in the morning)  - under 100 mg/dl  2 hours after meals under 140  The American Diabetes Association recommends:   a glucose tolerance test 6 weeks after you deliver the baby.       a hemoglobin Alc test yearly after delivery.  The Alc test is a 2-3 month average blood sugar reading.        Discuss getting these tests with your provider.     After delivery, and your provider has said that it is safe to be active, work toward getting 150 minutes each week of physical activity to decrease your risk of  getting type  2 diabetes.     Maintaining a healthy body weight will also decrease your risk of getting type 2 diabetes.

## 2023-06-09 NOTE — NURSING NOTE
Teetee Alvarado is a  at 35w2d who presents to [facility] for BPP. Pt reports positive fetal movement. Pt denies bldg/lof/change in discharge, contractions, headache, vision changes, chest pain/SOB or edema. SBAR given to Dr. Rae, see note in Epic.

## 2023-06-11 ENCOUNTER — HOSPITAL ENCOUNTER (OUTPATIENT)
Facility: CLINIC | Age: 38
End: 2023-06-11
Admitting: FAMILY MEDICINE
Payer: COMMERCIAL

## 2023-06-11 ENCOUNTER — NURSE TRIAGE (OUTPATIENT)
Dept: NURSING | Facility: CLINIC | Age: 38
End: 2023-06-11

## 2023-06-11 ENCOUNTER — APPOINTMENT (OUTPATIENT)
Dept: ULTRASOUND IMAGING | Facility: CLINIC | Age: 38
End: 2023-06-11
Attending: FAMILY MEDICINE
Payer: COMMERCIAL

## 2023-06-11 ENCOUNTER — HOSPITAL ENCOUNTER (OUTPATIENT)
Facility: CLINIC | Age: 38
Discharge: HOME | End: 2023-06-11
Attending: FAMILY MEDICINE | Admitting: FAMILY MEDICINE
Payer: COMMERCIAL

## 2023-06-11 ENCOUNTER — NURSE TRIAGE (OUTPATIENT)
Dept: NURSING | Facility: CLINIC | Age: 38
End: 2023-06-11
Payer: COMMERCIAL

## 2023-06-11 PROBLEM — Z36.89 ENCOUNTER FOR TRIAGE IN PREGNANT PATIENT: Status: ACTIVE | Noted: 2023-06-11

## 2023-06-11 LAB
CLUE CELLS: PRESENT
TRICHOMONAS, WET PREP: ABNORMAL
WBC'S/HIGH POWER FIELD, WET PREP: ABNORMAL
YEAST, WET PREP: ABNORMAL

## 2023-06-11 PROCEDURE — 76815 OB US LIMITED FETUS(S): CPT

## 2023-06-11 PROCEDURE — 87210 SMEAR WET MOUNT SALINE/INK: CPT | Performed by: FAMILY MEDICINE

## 2023-06-11 PROCEDURE — G0463 HOSPITAL OUTPT CLINIC VISIT: HCPCS

## 2023-06-11 ASSESSMENT — ACTIVITIES OF DAILY LIVING (ADL)
ADLS_ACUITY_SCORE: 35
ADLS_ACUITY_SCORE: 35

## 2023-06-11 NOTE — TELEPHONE ENCOUNTER
OB Triage Call      Is patient's OB/Midwife with the formerly [facility] or [facility]? [facility]- Is patient's primary OB a Midwife? No- Proceed with triage.     Reason for call: subjective report that baby has dropped, back pain, abdomen feels tight    Assessment:   On and off abdominal tightness, not regular. Last time she had observed was in the morning  Baby is active  Pt feels like she mid back pain on her sides. Increased urinary frequency, no dysuria.  Pt states she has some on and off cramps which feels like a period, not present at the time of call    Pt has been drinking more fluids, well hydrated  No issues with constipation  No fever  No leaking of fluid from vagina  No vaginal bleeding or discharge  No contractions  No pelvic pressure      Plan:  Discuss with PCP within 24 hours. Pt states that most people have mentioned that abdomen appears low, but pt states she is not feeling any different.    FNA advised that increased urinary frequency and back aches are common during third trimester. Educated that Beaver Lemos contractions are painless. Sending message to PCP for review.    Patient's primary OB Provider is Dr. Elmira Frank.    Per protocol recommendations Patient to schedule follow up appointment within 24 hours.      Is patient's delivering hospital on divert? Does not apply due to message sent to PCP to determine if needing to be seen sooner. Pt has US tomorrow , prenatal visit on       35w4d    Estimated Date of Delivery: 2023        OB History    Para Term  AB Living   1 0 0 0 0 0   SAB IAB Ectopic Multiple Live Births   0 0 0 0 0      # Outcome Date GA Lbr Jose F/2nd Weight Sex Delivery Anes PTL Lv   1 Current                No results found for: GBS       Asia Ruiz RN 23 1:34 PM  [facility] Nurse Advisor        Reason for Disposition    Baby has dropped    Additional Information    Negative: Passed out (i.e., lost consciousness, collapsed and was  "not responding)    Negative: Shock suspected (e.g., cold/pale/clammy skin, too weak to stand, low BP, rapid pulse)    Negative: Difficult to awaken or acting confused (e.g., disoriented, slurred speech)    Negative: [1] SEVERE abdominal pain (e.g., excruciating) AND [2] constant AND [3] present > 1 hour    Negative: Severe bleeding (e.g., continuous red blood from vagina, or large blood clots)    Negative: Umbilical cord hanging out of the vagina (shiny, white, curled appearance, \"like telephone cord\")    Negative: Uncontrollable urge to push (i.e., feels like baby is coming out now)    Negative: Can see baby    Negative: Sounds like a life-threatening emergency to the triager    Negative: MODERATE-SEVERE abdominal pain    Negative: [1] Contractions < 10 minutes apart AND [2] persist > 1 hour  (i.e., 6 or more contractions an hour)    Negative: [1] Contractions > 10 minutes apart AND [2] persist > 24 hours AND [3] no improvement using CARE ADVICE    Negative: [1] Contractions AND [2] any vaginal bleeding (including: red blood, clots, spotting, or pink/brown mucous)    Negative: Vaginal bleeding  (Exception: brief spotting after intercourse or pelvic exam, or slight pinkish or brownish mucous discharge)    Negative: Leakage of fluid from vagina    Negative: [1] Baby moving less today (e.g., kick count < 5 in 1 hour or < 10 in 2 hours) AND [2] pregnant 23 or more weeks    Negative: No movement of baby for 8 hours    Negative: Severe headache or headache that won't go away    Negative: New blurred vision or vision changes    Negative: Fever > 100.4 F (38.0 C)    Negative: Increased pressure in pelvic area    Negative: [1] Lower back discomfort AND [2] not relieved by rest    Negative: Has a cerclage (i.e., a procedure where the obstetrician stitches shut the cervix)    Negative: Pinkish or brownish mucous discharge  (Exception: brief spotting after intercourse or pelvic exam)    Negative: Patient sounds very sick or " weak to the triager    Protocols used: PREGNANCY - LABOR - VCDEOXR-L-CT

## 2023-06-11 NOTE — TELEPHONE ENCOUNTER
"OB Triage Call      Is patient's OB/Midwife with the [facility] or [facility]? [facility]- Proceed with triage     Reason for call: Teetee calling noticed watery, bloody drainage and bloody underwear when she went to the bathroom. Also Intermittent cramping she rates \"6.\" She states she is unable to time how often she is cramping but noticed it has been happening since this morning.    Assessment: 35 weeks and 4 days    Plan: Go to L and D    Patient plans to deliver at [facility]    Patient's primary OB Provider is Dr Frank.      Per protocol recommendations Patient to be evaluated in L&D. Patient's primary OB is [facility].  Labor and delivery at [facility] ([phone]) notified of patient's pending arrival.  Report given to Nuha.      Is patient's delivering hospital on divert? No         35w4d    Estimated Date of Delivery: 2023        OB History    Para Term  AB Living   1 0 0 0 0 0   SAB IAB Ectopic Multiple Live Births   0 0 0 0 0      # Outcome Date GA Lbr Jose F/2nd Weight Sex Delivery Anes PTL Lv   1 Current                No results found for: GBS       Astrid Hess RN 23 3:17 PM  [facility] [place] Nurse Advisor    Reason for Disposition    MILD-MODERATE vaginal bleeding (i.e., small to medium clots; like mild menstrual period)  (Exception: Single episode after sexual intercourse or pelvic exam.)    Additional Information    Negative: Passed out (i.e., fainted, collapsed and was not responding)    Negative: Shock suspected (e.g., cold/pale/clammy skin, too weak to stand, low BP, rapid pulse)    Negative: Difficult to awaken or acting confused (e.g., disoriented, slurred speech)    Negative: SEVERE vaginal bleeding (e.g., continuous red blood from vagina, or large blood clots)    Negative: SEVERE constant abdominal pain (e.g., excruciating) and present > 1 hour    Negative: Sounds like a life-threatening emergency to the triager    Protocols used: PREGNANCY - VAGINAL " BLEEDING GREATER THAN 20 WEEKS EGA-A-OH

## 2023-06-11 NOTE — PROGRESS NOTES
OB Triage Note    Teetee Alvarado is a 38 year old  at 35w4d of gestation based on 22 week dating ultrasound who has presented to maternity care for further evaluation of possible ROM.  This occurred around 1500 on 23 while at work. Fluids bloody with intermittent cramping rated a 6/10. Cramping has since subsided and she denies current pain. continues to have avila blood in pad.  She denies recent intercourse.  Fetal movement is present.    PRENATAL CARE: Seen by Rosa Frank DO at [facility].    PAST MEDICAL HISTORY:    AMA, obesity BMI 48, GDMA2, HSV    PAST SURGICAL HISTORY: reviewed abscess drainage, wisdom teeth extraction, lasik    MEDICATIONS:  basal/bolus insulin and Prenatal vitamins    SOCIAL HISTORY:    Smoking: None   Alcohol: None   Illicit drug use: None     PHYSICAL EXAMINATION:      Per nursing and Dr. Funk   : avila blood in vaginal vault, fingertip cervix    ASSESSMENT/PLAN:   39 yo  @ 35w4d with history of AMA, obesity BMI 48, GDMA2, and HSV2 presenting with possible premature PROM. Given avila blood in vaginal vault, ROM+ would be inaccurate so will pursue ultrasound to compare URBANO (recent URBANO 4.5 cm as of 23). Fetal status reassuring and patient no longer having cramping/lack of contractions on NST.       ADDENDUM----  No significant change in MVP on recent US. Placenta remains anterior without signs of subchorionic hemorrhage. No further bleeding reported by patient or nursing. Fetal strip remains category 1. Recommend collecting wet prep and discharging home with return precautions. Our clinic will contact patient tomorrow to schedule follow up visit early next week. She is already scheduled for twice weekly BPPs through [facility] / GDMA2.    Rosa Frank DO

## 2023-06-12 ENCOUNTER — ANCILLARY PROCEDURE (OUTPATIENT)
Dept: ULTRASOUND IMAGING | Facility: HOSPITAL | Age: 38
End: 2023-06-12
Attending: STUDENT IN AN ORGANIZED HEALTH CARE EDUCATION/TRAINING PROGRAM
Payer: COMMERCIAL

## 2023-06-12 ENCOUNTER — OFFICE VISIT (OUTPATIENT)
Dept: MATERNAL FETAL MEDICINE | Facility: HOSPITAL | Age: 38
End: 2023-06-12
Attending: STUDENT IN AN ORGANIZED HEALTH CARE EDUCATION/TRAINING PROGRAM
Payer: COMMERCIAL

## 2023-06-12 DIAGNOSIS — B96.89 BV (BACTERIAL VAGINOSIS): Primary | ICD-10-CM

## 2023-06-12 DIAGNOSIS — N76.0 BV (BACTERIAL VAGINOSIS): Primary | ICD-10-CM

## 2023-06-12 DIAGNOSIS — O99.213 OBESITY AFFECTING PREGNANCY IN THIRD TRIMESTER: ICD-10-CM

## 2023-06-12 DIAGNOSIS — O24.414 INSULIN CONTROLLED GESTATIONAL DIABETES MELLITUS (GDM) IN THIRD TRIMESTER: Primary | ICD-10-CM

## 2023-06-12 DIAGNOSIS — O24.113 TYPE 2 DIABETES MELLITUS COMPLICATING PREGNANCY IN THIRD TRIMESTER, ANTEPARTUM: ICD-10-CM

## 2023-06-12 PROCEDURE — 76819 FETAL BIOPHYS PROFIL W/O NST: CPT | Mod: 26 | Performed by: STUDENT IN AN ORGANIZED HEALTH CARE EDUCATION/TRAINING PROGRAM

## 2023-06-12 PROCEDURE — 99207 PR NO CHARGE LOS: CPT | Performed by: STUDENT IN AN ORGANIZED HEALTH CARE EDUCATION/TRAINING PROGRAM

## 2023-06-12 PROCEDURE — 76819 FETAL BIOPHYS PROFIL W/O NST: CPT

## 2023-06-12 RX ORDER — METRONIDAZOLE 500 MG/1
500 TABLET ORAL 2 TIMES DAILY
Qty: 14 TABLET | Refills: 0 | Status: SHIPPED | OUTPATIENT
Start: 2023-06-12 | End: 2023-06-19

## 2023-06-12 NOTE — PROGRESS NOTES
Discharge paperwork discussed with and signed by pt. Pt ambulated off unit for discharge home with spouse without concerns at this time.

## 2023-06-12 NOTE — NURSING NOTE
Teetee Alvarado is a  at 35w5d who presents to [facility] for scheduled twice weekly BPP. Pt reports positive fetal movement. PT REPORTS GOING IN TO BE SEEN YESTERDAY FOR BLEEDING AND WHAT SHE THOUGH WAS ROM. NEGATIVE WORKUP BESIDES BACTERIAL VAGINOSIS. PLANS TO  ANTIBIOTIC THIS MORNING. Pt denies headache, vision changes, chest pain/SOB or edema. SBAR given to Dr. PERERA, see note in Epic.

## 2023-06-12 NOTE — DISCHARGE INSTRUCTIONS
Bacterial Vaginosis    You have a vaginal infection called bacterial vaginosis (BV). Both good and bad bacteria are present in a healthy vagina. BV occurs when these bacteria get out of balance. The number of bad bacteria increase. And the number of good bacteria decrease. BV is linked with sexual activity, but it's not a sexually transmitted infection (STI).   BV may or may not cause symptoms. If symptoms do occur, they can include:   Thin, gray, milky-white, or sometimes green discharge  Unpleasant odor or  fishy  smell  Itching, burning, or pain in or around the vagina  It's not known what causes BV, but certain factors can make the problem more likely. These can include:   Douching  Spermicides  Use of antibiotics  Change in hormone levels with pregnancy, breastfeeding, or menopause  Having sex with a new partner  Having sex with more than one partner  BV will sometimes go away on its own. But treatment is often advised. This is because untreated BV can raise the risk of more serious health problems, such as:   Pelvic inflammatory disease (PID)   delivery (giving birth to a baby early if you re pregnant)  HIV and some other sexually transmitted infections (STIs)  Infection after surgery on the reproductive organs  Home care  General care  BV is most often treated with medicines called antibiotics. These may be given as pills or as a vaginal cream. If antibiotics are prescribed, be sure to use them exactly as directed. And complete all of the medicine, even if your symptoms go away.  Don't douche or have sex during treatment.  If you have sex with a female partner, ask your healthcare provider if she should also be treated.  Prevention  Don't douche.  Don't have sex. If you do have sex, take steps to lower your risk:  Use condoms when having sex.  Limit the number of sex partners you have.    Follow-up care  Follow up with your healthcare provider, or as advised.   When to get medical advice  Call your  healthcare provider right away if any of the following occur:   You have a fever of 100.4 F (38 C) or higher, or as directed by your healthcare provider.  Your symptoms get worse, or they don t go away within a few days of starting treatment.  You have new pain in the lower belly or pelvic region.  You have side effects that bother you or a reaction to the pills or cream you re prescribed.  You or any of your sex partners have new symptoms, such as a rash, joint pain, or sores.  [employer] last reviewed this educational content on 10/1/2022    5748-0849 [employer]. All rights reserved. This information is not intended as a substitute for professional medical care. Always follow your healthcare professional's instructions.      Discharge Instruction for Undelivered Patients      You were seen for: Bleeding Assessment  You had (Test or Medicine): NST, Wet Prep, Ultrasound    Diet:   Drink 8 to 12 glasses of liquids (milk, juice, water) every day.  You may eat meals and snacks.     Activity:  Count fetal kicks everyday (see handout)  Call your doctor or nurse midwife if your baby is moving less than usual.     Call your provider if you notice:  Swelling in your face or increased swelling in your hands or legs.  Headaches that are not relieved by Tylenol (acetaminophen).  Changes in your vision (blurring: seeing spots or stars.)  Nausea (sick to your stomach) and vomiting (throwing up).   Weight gain of 5 pounds or more per week.  Heartburn that doesn't go away.  Signs of bladder infection: pain when you urinate (use the toilet), need to go more often and more urgently.  The bag of sanchez (rupture of membranes) breaks, or you notice leaking in your underwear.  Bright red blood in your underwear.  Abdominal (lower belly) or stomach pain.  For first baby: Contractions (tightening) less than 5 minutes apart for one hour or more.  Second (plus) baby: Contractions (tightening) less than 10 minutes apart and  getting stronger.  *If less than 34 weeks: Contractions (tightening) more than 6 times in one hour.  Increase or change in vaginal discharge (note the color and amount)    Follow-up:  As scheduled in the clinic

## 2023-06-12 NOTE — PROGRESS NOTES
Pt care taken over. Pt reports +FM and decreased in bleeding. States no pain or contractions. Abdomen palpates soft, large fetal movements visualized and audible. Looked at pad and had very minimal dark blood on pad. Will monitor for hour and report to provider.

## 2023-06-12 NOTE — PROGRESS NOTES
Wet prep collected. Dark brown blood noted on qtip swap with no other visible blood noted. Discussed josesito s/s to look for in regard to bleeding, ROM, contractions and FM. Pt has no questions at this time and is agreeable to discharge at this time and await contact from Dr. Frank through my chart tonight with wet prep results.

## 2023-06-12 NOTE — PROGRESS NOTES
Unable to assess SVE - cervix is high. Blood present with check. Minimal bleeding on pad now. Pt went for ultrasound.  Spoke with Dr. Saxena 1915, instructed to get full hour of fetal monitoring tracing, and if pt is not bleeding and strip is cat 1, ok to send home.   EFM cat 1, baseline 120, moderate variability, accels present. Pt denies pain. Vital signs stable.    Handed off to night ASTRID Crawford.     Leeann Martino RN on 6/11/2023 at 7:21 PM

## 2023-06-15 ENCOUNTER — TELEPHONE (OUTPATIENT)
Dept: FAMILY MEDICINE | Facility: CLINIC | Age: 38
End: 2023-06-15

## 2023-06-15 ENCOUNTER — PRENATAL OFFICE VISIT (OUTPATIENT)
Dept: FAMILY MEDICINE | Facility: CLINIC | Age: 38
End: 2023-06-15
Payer: COMMERCIAL

## 2023-06-15 ENCOUNTER — HOSPITAL ENCOUNTER (OUTPATIENT)
Dept: ULTRASOUND IMAGING | Facility: HOSPITAL | Age: 38
Discharge: HOME | End: 2023-06-15
Attending: FAMILY MEDICINE | Admitting: FAMILY MEDICINE
Payer: COMMERCIAL

## 2023-06-15 ENCOUNTER — MYC MEDICAL ADVICE (OUTPATIENT)
Dept: FAMILY MEDICINE | Facility: CLINIC | Age: 38
End: 2023-06-15

## 2023-06-15 VITALS
SYSTOLIC BLOOD PRESSURE: 131 MMHG | WEIGHT: 288 LBS | HEIGHT: 65 IN | DIASTOLIC BLOOD PRESSURE: 77 MMHG | TEMPERATURE: 98.7 F | BODY MASS INDEX: 47.98 KG/M2 | HEART RATE: 101 BPM | RESPIRATION RATE: 20 BRPM | OXYGEN SATURATION: 100 %

## 2023-06-15 DIAGNOSIS — O09.513 HIGH-RISK PREGNANCY, PRIMIGRAVIDA OF ADVANCED MATERNAL AGE IN THIRD TRIMESTER: ICD-10-CM

## 2023-06-15 DIAGNOSIS — O09.522 MULTIGRAVIDA OF ADVANCED MATERNAL AGE IN SECOND TRIMESTER: ICD-10-CM

## 2023-06-15 DIAGNOSIS — E66.01 MORBID OBESITY (H): ICD-10-CM

## 2023-06-15 DIAGNOSIS — O09.513 HIGH-RISK PREGNANCY, PRIMIGRAVIDA OF ADVANCED MATERNAL AGE IN THIRD TRIMESTER: Primary | ICD-10-CM

## 2023-06-15 DIAGNOSIS — O24.414 INSULIN CONTROLLED GESTATIONAL DIABETES MELLITUS (GDM) IN SECOND TRIMESTER: ICD-10-CM

## 2023-06-15 DIAGNOSIS — O24.813 OTHER PRE-EXISTING DIABETES MELLITUS DURING PREGNANCY IN THIRD TRIMESTER: ICD-10-CM

## 2023-06-15 DIAGNOSIS — F90.0 ATTENTION DEFICIT HYPERACTIVITY DISORDER (ADHD), PREDOMINANTLY INATTENTIVE TYPE: ICD-10-CM

## 2023-06-15 DIAGNOSIS — R76.8 HSV-2 SEROPOSITIVE: ICD-10-CM

## 2023-06-15 LAB
ALBUMIN UR-MCNC: 30 MG/DL
ANION GAP SERPL CALCULATED.3IONS-SCNC: 11 MMOL/L (ref 7–15)
APPEARANCE UR: CLEAR
BACTERIA #/AREA URNS HPF: ABNORMAL /HPF
BILIRUB UR QL STRIP: NEGATIVE
BUN SERPL-MCNC: 7.9 MG/DL (ref 6–20)
CALCIUM SERPL-MCNC: 9.7 MG/DL (ref 8.6–10)
CHLORIDE SERPL-SCNC: 105 MMOL/L (ref 98–107)
COLOR UR AUTO: YELLOW
CREAT SERPL-MCNC: 0.68 MG/DL (ref 0.51–0.95)
DEPRECATED HCO3 PLAS-SCNC: 20 MMOL/L (ref 22–29)
GFR SERPL CREATININE-BSD FRML MDRD: >90 ML/MIN/1.73M2
GLUCOSE SERPL-MCNC: 181 MG/DL (ref 70–99)
GLUCOSE UR STRIP-MCNC: 100 MG/DL
HBA1C MFR BLD: 6.4 % (ref 0–5.6)
HGB BLD-MCNC: 13.2 G/DL (ref 11.7–15.7)
HGB UR QL STRIP: NEGATIVE
KETONES UR STRIP-MCNC: 40 MG/DL
LEUKOCYTE ESTERASE UR QL STRIP: NEGATIVE
MUCOUS THREADS #/AREA URNS LPF: PRESENT /LPF
NITRATE UR QL: NEGATIVE
PH UR STRIP: 5.5 [PH] (ref 5–8)
POTASSIUM SERPL-SCNC: 4.3 MMOL/L (ref 3.4–5.3)
RBC #/AREA URNS AUTO: ABNORMAL /HPF
SODIUM SERPL-SCNC: 136 MMOL/L (ref 136–145)
SP GR UR STRIP: 1.02 (ref 1–1.03)
SQUAMOUS #/AREA URNS AUTO: ABNORMAL /LPF
UROBILINOGEN UR STRIP-ACNC: 0.2 E.U./DL
WBC #/AREA URNS AUTO: ABNORMAL /HPF

## 2023-06-15 PROCEDURE — 85018 HEMOGLOBIN: CPT | Performed by: FAMILY MEDICINE

## 2023-06-15 PROCEDURE — 76819 FETAL BIOPHYS PROFIL W/O NST: CPT

## 2023-06-15 PROCEDURE — 36415 COLL VENOUS BLD VENIPUNCTURE: CPT | Performed by: FAMILY MEDICINE

## 2023-06-15 PROCEDURE — 99207 PR COMPLICATED OB VISIT: CPT | Performed by: FAMILY MEDICINE

## 2023-06-15 PROCEDURE — 87653 STREP B DNA AMP PROBE: CPT | Performed by: FAMILY MEDICINE

## 2023-06-15 PROCEDURE — 80048 BASIC METABOLIC PNL TOTAL CA: CPT | Performed by: FAMILY MEDICINE

## 2023-06-15 PROCEDURE — 81001 URINALYSIS AUTO W/SCOPE: CPT | Performed by: FAMILY MEDICINE

## 2023-06-15 PROCEDURE — 83036 HEMOGLOBIN GLYCOSYLATED A1C: CPT | Performed by: FAMILY MEDICINE

## 2023-06-15 RX ORDER — BLOOD-GLUCOSE SENSOR
1 EACH MISCELLANEOUS
Qty: 2 EACH | Refills: 11 | Status: ON HOLD | OUTPATIENT
Start: 2023-06-15 | End: 2023-06-30

## 2023-06-15 ASSESSMENT — PATIENT HEALTH QUESTIONNAIRE - PHQ9
10. IF YOU CHECKED OFF ANY PROBLEMS, HOW DIFFICULT HAVE THESE PROBLEMS MADE IT FOR YOU TO DO YOUR WORK, TAKE CARE OF THINGS AT HOME, OR GET ALONG WITH OTHER PEOPLE: SOMEWHAT DIFFICULT
SUM OF ALL RESPONSES TO PHQ QUESTIONS 1-9: 12
SUM OF ALL RESPONSES TO PHQ QUESTIONS 1-9: 12

## 2023-06-15 NOTE — PROGRESS NOTES
"Teetee Alvarado is a 38 year old  female who presents to clinic for a follow up OB visit. She is currently 36w1d with an estimated date of delivery of 2023 via 22-week ultrasound. She denies headache, chest pain, shortness of breath, abdominal pain/contractions, vaginal bleeding, or abnormal discharge. She continues to feel baby move.     New concerns today: Has not taken bolus insulin since 2023 because accidentally displaced her CGM and notes it is too difficult for her to spot check blood sugars with her ADHD.  She is also asymptomatic during hyper and hypoglycemia episodes so is concerned she may go low without the monitor.  Vaginal bleeding is subsided, now has some brown discharge.  Has been taking oral Flagyl as prescribed.    OB History    Para Term  AB Living   1 0 0 0 0 0   SAB IAB Ectopic Multiple Live Births   0 0 0 0 0      # Outcome Date GA Lbr Jose F/2nd Weight Sex Delivery Anes PTL Lv   1 Current                Physical exam:  /77   Pulse 101   Temp 98.7  F (37.1  C) (Oral)   Resp 20   Ht 1.645 m (5' 4.75\")   Wt 130.6 kg (288 lb)   LMP  (LMP Unknown)   SpO2 100%   BMI 48.30 kg/m      General: alert, female in no acute distress  Abdomen: gravid uterus appropriate for gestation age at 37 cm above pubic symphysis, non-tender, FHTs 150  Extremities: Trace edema bilateral ankles    Prenatal labs  Blood type: A+  Hgb: 13.6-->13.1  Pap: Will need postpartum  Hep B, HIV, syphilisx2, gonorrhea, chlamydia, HIV negative  Rubella immune  UA/Ucx normal  Tdap 2023  Plans for epidural  Boy! Desires circumcision    Assessment/Plan:  High-risk pregnancy, primigravida of advanced maternal age in third trimester  Recently seen at [facility] OB triage for vaginal bleeding.  Concern for ROM at that time but due to vaginal bleeding unable to adequately assess ROM plus.  Ultrasound showed no significant change in fluid pocket.  Wet prep was positive for BV so she was subsequently " treated with Flagyl.  Vaginal bleeding has since resolved, she has had a normal BPP since that time with no contraction concerns.  On daily baby aspirin for preeclampsia prophylaxis which will be stopped prior to planned induction.  GBS swab collected today.     Morbid obesity (H)  Body mass index is 48.3 kg/m . TWG 13 lbs. Recommended weight gain throughout pregnancy <15 pounds.        Insulin controlled gestational diabetes mellitus (GDM) in third trimester  Prediabetes  Following with endocrinology/diabetes education requiring high doses of basal/bolus insulin for GDM.  BPP's have been reassuring. LGA infant noted at 30-week ultrasound, though last growth ultrasound 2023 EFW 79 percentile, 2631 g.  Induction planned for 39 weeks due to maternal BMI greater than 40.  Additional growth ultrasound ordered and will also have a consult with [facility] regarding induction timing.  A1c increased to 6.4% and she has been without her bolus insulin x4 days.  Reiterated importance of point-of-care glucose checking if CGM is not available in administrating appropriate insulin doses to prevent maternal/ complications.     HSV-2 seropositive  Started acyclovir 400 mg 3 times daily 36 weeks until delivery for HSV ppx.      Attention deficit hyperactivity disorder (ADHD), predominantly inattentive type  No longer on stimulant medication due to pregnancy.     Anxiety and depression  Stable off sertraline, monitor for labile mood and postpartum depression.     Follow up in 1 week for routine prenatal visit.     Rosa Frank, DO        Answers for HPI/ROS submitted by the patient on 6/15/2023  If you checked off any problems, how difficult have these problems made it for you to do your work, take care of things at home, or get along with other people?: Somewhat difficult  PHQ9 TOTAL SCORE: 12

## 2023-06-15 NOTE — TELEPHONE ENCOUNTER
Patient called to see if we could supply her with a august 3 freestyle. She stated Dr. Frank told her at her appointment today that they might have a free one she can  today. I spoke with Dr. Martinez and she told me that she is pretty sure that we do not have on in clinic. Per our RN Perfecto we have a few but they are all  and we cannot issue these to patient's.    Patient is returning the call. Rn not available.  Please call

## 2023-06-15 NOTE — PATIENT INSTRUCTIONS
Growth ultrasound should have been added on to this weeks or next weeks ultrasound.    [facility] consultation to see if we should induce you sooner than 39 weeks

## 2023-06-15 NOTE — TELEPHONE ENCOUNTER
Checked supply of sensors in clinic. What we have are . Melyssa called and relayed this information to the patient.    Perfecto Joshi RN     [facility]

## 2023-06-15 NOTE — TELEPHONE ENCOUNTER
I called patient quick to let her know that we do have some but we unfortunately cannot issue one out because they are . She stated she understood and told me she will pick hers up at the pharmacy tomorrow and that it is not an issue.

## 2023-06-16 LAB — GP B STREP DNA SPEC QL NAA+PROBE: POSITIVE

## 2023-06-16 NOTE — PROGRESS NOTES
Diabetes Self-Management and Care Support    Teetee is here alone today for her follow up on Gestational Diabetes.  She is currently taking insulin to help control her glucose.  She is feeling pretty good.  Stated she is not having swelling in her hands, not having swelling in her feet and her baby is moving a lot.  Teetee saw her OB today and saw told things are looking good.    OB-Rosa Frank  EDC-7.12.23-35 weeks 1 day  Pregnancy number-1  OGTT-129/221/182/155  Previous GDM-No    Current insulin doses  Basal Levemir 46 units  Prandial Novolog 42/32/42    Current blood sugars      She did miss her insulin last night, resulting in higher readings this morning.  Tuesday June 6th she had a  after dinner causing glucose to rise to over 200.      Plan-  Continue with current doses.  Increase by 2 units every 2 times reading is above goal.Call if there is any change in readings before next appointment.  Follow up scheduled for 2 weeks with endocrinology       The service provided today was under the supervising provider, Key Sahu, who was available if needed.     Melyssa Baker RN, [facility]  [phone]  In Clinic Tuesday, Wednesday, Thursday  In person   DSMT 30 minutes

## 2023-06-19 ENCOUNTER — ANCILLARY PROCEDURE (OUTPATIENT)
Dept: ULTRASOUND IMAGING | Facility: HOSPITAL | Age: 38
End: 2023-06-19
Attending: OBSTETRICS & GYNECOLOGY
Payer: COMMERCIAL

## 2023-06-19 ENCOUNTER — TRANSFERRED RECORDS (OUTPATIENT)
Dept: HEALTH INFORMATION MANAGEMENT | Facility: CLINIC | Age: 38
End: 2023-06-19

## 2023-06-19 ENCOUNTER — OFFICE VISIT (OUTPATIENT)
Dept: MATERNAL FETAL MEDICINE | Facility: HOSPITAL | Age: 38
End: 2023-06-19
Attending: OBSTETRICS & GYNECOLOGY
Payer: COMMERCIAL

## 2023-06-19 DIAGNOSIS — O99.213 OBESITY AFFECTING PREGNANCY IN THIRD TRIMESTER: ICD-10-CM

## 2023-06-19 DIAGNOSIS — O24.113 TYPE 2 DIABETES MELLITUS COMPLICATING PREGNANCY IN THIRD TRIMESTER, ANTEPARTUM: ICD-10-CM

## 2023-06-19 DIAGNOSIS — O24.414 INSULIN CONTROLLED GESTATIONAL DIABETES MELLITUS (GDM) IN THIRD TRIMESTER: Primary | ICD-10-CM

## 2023-06-19 PROCEDURE — 76819 FETAL BIOPHYS PROFIL W/O NST: CPT | Mod: 26 | Performed by: STUDENT IN AN ORGANIZED HEALTH CARE EDUCATION/TRAINING PROGRAM

## 2023-06-19 PROCEDURE — 99207 PR NO CHARGE LOS: CPT | Performed by: STUDENT IN AN ORGANIZED HEALTH CARE EDUCATION/TRAINING PROGRAM

## 2023-06-19 PROCEDURE — 76819 FETAL BIOPHYS PROFIL W/O NST: CPT

## 2023-06-19 NOTE — NURSING NOTE
Teetee Alvarado is a  at 36w5d who presents to [facility] for scheduled biophysical profile. Pt reports positive fetal movement. Pt denies bldg/lof/change in discharge, contractions, headache, vision changes, chest pain/SOB or edema. SBAR given to Dr. Samson, see note in Epic.

## 2023-06-21 NOTE — PROGRESS NOTES
[facility]  ENDOCRINOLOGY    Gestational Diabetes 2023    Teetee Alvarado, 1985, [id number]          Reason for visit      1. Insulin controlled gestational diabetes mellitus (GDM) in third trimester        HPI     Teetee Alvarado is a very pleasant 38 year old old female who presents for GESTATIONAL Diabetes Mellitus.  She is currently 37w2d  pregnant . Due date is 23.  Diagnosed with GDM based on an OGTT. She hasnot had  GDM in prior pregnancies.   Current carbohydrate intake:consistent with recommendations of 30g-60g-60g.  I have reviewed her blood glucose logs and note that the:  Fasting readings  are:in range on current regimen  Postprandial readings are:in range on current regimen  Current Levemir dose: 46  Current Prandial insulin:42//42  Blood glucose logs/meter brought in and data reviewed and incorporated into decision-making.  Planned delivery at:   OBN: [facility]  Therapy/Interventions in the past:  She has been seen by the Diabetes Educator- and has received instruction on carbohydrate counting and  consistency.  Records from referring provider and other sources have also been reviewed and incorporated into decision-making.      TODAY:    Teetee is here in f/u for GDM.  Pt provides BG in the way of the Robert download. She is still having elevations postprandially, but things have improved.  On , she continues to eat a very high protein lunch with Barbecue sauce and then goes low. Baby is moving appropriately and she is having no swelling. She will be likely undergoing IOD, but will not find out until next week with an US determining how big baby is. Postpartum instructions given today and all questions answered to her satisfaction. She will call with problems or concerns prior to delivery.     Past Medical History       Patient Active Problem List   Diagnosis     Attention deficit hyperactivity disorder (ADHD), predominantly inattentive type     PCOS (polycystic ovarian syndrome)      Anxiety and depression     Anal fistula     Gastroesophageal reflux disease     HSV-2 seropositive     Morbid obesity (H)     Prediabetes     Multigravida of advanced maternal age in second trimester     Elevated glucose tolerance test     Insulin controlled gestational diabetes mellitus (GDM) in third trimester     High-risk pregnancy, primigravida of advanced maternal age in third trimester     Encounter for triage in pregnant patient     Positive GBS test        Past Surgical History     Past Surgical History:   Procedure Laterality Date     ABSCESS DRAINAGE Left     left leg     LASIK       WISDOM TOOTH EXTRACTION         Family History     Family History   Problem Relation Age of Onset     Diabetes Type 2  Father      Arthritis Father        Social History     Social History     Tobacco Use     Smoking status: Every Day     Types: Vaping Device     Smokeless tobacco: Never     Tobacco comments:     vaping-daily   Substance Use Topics     Alcohol use: Not Currently     Drug use: Yes     Frequency: 7.0 times per week     Types: Marijuana     Comment: Drug use: daily       Review of Systems     Patient has no polyuria or polydipsia, no chest pain, dyspnea or TIA's, no numbness, tingling or pain in extremities  Remainder negative except as noted in HPI.    Answers for HPI/ROS submitted by the patient on 6/22/2023  General Symptoms: No  Skin Symptoms: No  HENT Symptoms: No  EYE SYMPTOMS: No  HEART SYMPTOMS: No  LUNG SYMPTOMS: No  INTESTINAL SYMPTOMS: Yes  URINARY SYMPTOMS: Yes  GYNECOLOGIC SYMPTOMS: Yes  BREAST SYMPTOMS: Yes  SKELETAL SYMPTOMS: Yes  BLOOD SYMPTOMS: No  NERVOUS SYSTEM SYMPTOMS: No  MENTAL HEALTH SYMPTOMS: Yes  Heart burn or indigestion: Yes  Nausea: No  Vomiting: No  Abdominal pain: No  Bloating: No  Constipation: No  Diarrhea: No  Blood in stool: No  Black stools: No  Rectal or Anal pain: No  Fecal incontinence: No  Yellowing of skin or eyes: No  Vomit with blood: No  Change in stools:  No  Trouble holding urine or incontinence: No  Pain or burning: No  Trouble starting or stopping: No  Increased frequency of urination: Yes  Blood in urine: No  Decreased frequency of urination: No  Frequent nighttime urination: Yes  Flank pain: No  Difficulty emptying bladder: No  Bleeding or spotting between periods: Yes  Heavy or painful periods: No  Irregular periods: No  Vaginal discharge: No  Vaginal dryness: No  Genital ulcers: No  Reduced libido: Yes  Painful intercourse: No  Difficulty with sexual arousal: No  Post-menopausal bleeding: No  Discharge: Yes  Lumps: No  Pain: Yes  Nipple retraction: No  Back pain: No  Muscle aches: Yes  Neck pain: No  Swollen joints: Yes  Joint pain: Yes  Bone pain: No  Muscle cramps: Yes  Muscle weakness: No  Joint stiffness: No  Bone fracture: No  Nervous or Anxious: Yes  Depression: No  Trouble sleeping: Yes  Trouble thinking or concentrating: Yes  Mood changes: No  Panic attacks: No      Vital Signs     /84 (BP Location: Right arm, Patient Position: Sitting, Cuff Size: Adult Large)   Pulse 111   Wt 130.4 kg (287 lb 8 oz)   LMP  (LMP Unknown)   SpO2 96%   BMI 48.21 kg/m    Wt Readings from Last 3 Encounters:   06/22/23 130.4 kg (287 lb 8 oz)   06/22/23 129.7 kg (286 lb)   06/15/23 130.6 kg (288 lb)       Physical Exam     GENERAL: Pleasant, alert, appropriate appearance for age. No acute distress, obese  HEENT: Normocephalic, atraumatic  NECK: normal in appearance  CHEST/RESPIRATORY: Normal chest wall and respirations.   ABDOMEN: Gravid   SKIN: No melanosis,  ecchymosis,  vitiligo. No acanthosis nigricans  NEURO:  Non-focal, no tremor.  PSYCH: Alert and oriented -appropriate affect. Orientation, judgement and memory appear intact.  MSK: No joint abnormalities, FROM in all four extremities. No kyphosis      Assessment     1. Insulin controlled gestational diabetes mellitus (GDM) in third trimester        Plan       1. GESTATIONAL DIABETES-  Adjust dose as  follows:     -Levemir [id number]   units. Increase by 2 units every 2 days to keep fasting blood glucose below 95mg/dL  -Novolog 42  units with breakfast  -Novolog 32 units with lunch   -Novolog 42 units with dinner     -Increase by 2 units every 2 days to keep 1 hour after meal blood glucose less than 140mg/dL     We reviewed glucose goals of fasting blood glucose <95 mg/dL and 1 hour post prandial blood glucose of <140 mg/dL.    Monitor blood sugar 4 times daily: Fasting  and 1 hour after each meal.  Contact  this clinic [phone] if blood glucose is not within the above-mentioned goals.      We discussed the importance of excellent glycemic control during pregnancy to limit complications such as fetal macrosomia, shoulder dystocia,  hypoglycemia and hyperbilirubinemia.  I have discussed the patient's increased risk of recurrent GDM and/or development of type 2 diabetes later in life.         F/up with A1c 3 months postpartum with PCP.    May be a candidate for metformin postpartum as she has more than 1 risk factor for future Type 2 Diabetes Mellitus.    She will need a screening A1c at least annually, TLC- including carbohydrate control and exercise.     After you deliver the baby, it is suggested to check your blood sugar occasionally (1 - 2 times per week) for 6 weeks.     When you are not pregnant, normal blood sugars are:       Fasting (before eating anything in the morning)  - under 100 mg/dl    2 hours after meals under 140  The American Diabetes Association recommends:     a glucose tolerance test 6 weeks after you deliver the baby.         a hemoglobin Alc test yearly after delivery.  The Alc test is a 2-3 month average blood sugar reading.        Discuss getting these tests with your provider.       After delivery, and your provider has said that it is safe to be active, work toward getting 150 minutes each week of physical activity to decrease your risk of  getting type 2  diabetes.       Maintaining a healthy body weight will also decrease your risk of getting type 2 diabetes.       Key Sahu NP  6/23/2023        Lab Results     Microalbumin Urine mg/dL   Date Value Ref Range Status   05/19/2021 1.03 0.00 - 1.99 mg/dL Final       Cholesterol   Date Value Ref Range Status   11/03/2022 155 <200 mg/dL Final     Direct Measure HDL   Date Value Ref Range Status   11/03/2022 47 (L) >=50 mg/dL Final     Triglycerides   Date Value Ref Range Status   11/03/2022 99 <150 mg/dL Final       [unfilled]      Current Medications

## 2023-06-22 ENCOUNTER — OFFICE VISIT (OUTPATIENT)
Dept: ENDOCRINOLOGY | Facility: CLINIC | Age: 38
End: 2023-06-22
Payer: COMMERCIAL

## 2023-06-22 ENCOUNTER — PRENATAL OFFICE VISIT (OUTPATIENT)
Dept: FAMILY MEDICINE | Facility: CLINIC | Age: 38
End: 2023-06-22
Payer: COMMERCIAL

## 2023-06-22 VITALS
DIASTOLIC BLOOD PRESSURE: 84 MMHG | HEART RATE: 117 BPM | SYSTOLIC BLOOD PRESSURE: 129 MMHG | OXYGEN SATURATION: 97 % | WEIGHT: 286 LBS | BODY MASS INDEX: 47.65 KG/M2 | HEIGHT: 65 IN

## 2023-06-22 VITALS
OXYGEN SATURATION: 96 % | SYSTOLIC BLOOD PRESSURE: 110 MMHG | WEIGHT: 287.5 LBS | DIASTOLIC BLOOD PRESSURE: 84 MMHG | BODY MASS INDEX: 48.21 KG/M2 | HEART RATE: 111 BPM

## 2023-06-22 DIAGNOSIS — F41.9 ANXIETY AND DEPRESSION: ICD-10-CM

## 2023-06-22 DIAGNOSIS — B95.1 POSITIVE GBS TEST: ICD-10-CM

## 2023-06-22 DIAGNOSIS — O24.414 INSULIN CONTROLLED GESTATIONAL DIABETES MELLITUS (GDM) IN THIRD TRIMESTER: ICD-10-CM

## 2023-06-22 DIAGNOSIS — E66.01 MORBID OBESITY (H): ICD-10-CM

## 2023-06-22 DIAGNOSIS — F90.0 ATTENTION DEFICIT HYPERACTIVITY DISORDER (ADHD), PREDOMINANTLY INATTENTIVE TYPE: ICD-10-CM

## 2023-06-22 DIAGNOSIS — F32.A ANXIETY AND DEPRESSION: ICD-10-CM

## 2023-06-22 DIAGNOSIS — O09.522 MULTIGRAVIDA OF ADVANCED MATERNAL AGE IN SECOND TRIMESTER: ICD-10-CM

## 2023-06-22 DIAGNOSIS — O09.513 HIGH-RISK PREGNANCY, PRIMIGRAVIDA OF ADVANCED MATERNAL AGE IN THIRD TRIMESTER: Primary | ICD-10-CM

## 2023-06-22 DIAGNOSIS — O24.414 INSULIN CONTROLLED GESTATIONAL DIABETES MELLITUS (GDM) IN THIRD TRIMESTER: Primary | ICD-10-CM

## 2023-06-22 DIAGNOSIS — R76.8 HSV-2 SEROPOSITIVE: ICD-10-CM

## 2023-06-22 PROCEDURE — 95251 CONT GLUC MNTR ANALYSIS I&R: CPT | Performed by: NURSE PRACTITIONER

## 2023-06-22 PROCEDURE — 99213 OFFICE O/P EST LOW 20 MIN: CPT | Performed by: NURSE PRACTITIONER

## 2023-06-22 PROCEDURE — 59425 ANTEPARTUM CARE ONLY: CPT | Performed by: FAMILY MEDICINE

## 2023-06-22 RX ORDER — HYDROXYZINE HYDROCHLORIDE 50 MG/1
50 TABLET, FILM COATED ORAL 3 TIMES DAILY PRN
Qty: 90 TABLET | Refills: 1 | Status: SHIPPED | OUTPATIENT
Start: 2023-06-22 | End: 2024-04-16

## 2023-06-22 RX ORDER — SERTRALINE HYDROCHLORIDE 25 MG/1
25 TABLET, FILM COATED ORAL DAILY
Qty: 90 TABLET | Refills: 1 | Status: SHIPPED | OUTPATIENT
Start: 2023-06-22 | End: 2023-08-30

## 2023-06-22 ASSESSMENT — ENCOUNTER SYMPTOMS
DECREASED CONCENTRATION: 1
NECK PAIN: 0
DEPRESSION: 0
DECREASED LIBIDO: 1
PANIC: 0
NERVOUS/ANXIOUS: 1
BACK PAIN: 0
VOMITING: 0
BLOOD IN STOOL: 0
INSOMNIA: 1
ABDOMINAL PAIN: 0
BREAST PAIN: 1
FLANK PAIN: 0
BOWEL INCONTINENCE: 0
BLOATING: 0
NAUSEA: 0
STIFFNESS: 0
RECTAL PAIN: 0
MUSCLE CRAMPS: 1
DIARRHEA: 0
CONSTIPATION: 0
BREAST MASS: 0
DYSURIA: 0
MYALGIAS: 1
JAUNDICE: 0
DIFFICULTY URINATING: 0
MUSCLE WEAKNESS: 0
JOINT SWELLING: 1
HEMATURIA: 0
HEARTBURN: 1
ARTHRALGIAS: 1

## 2023-06-22 ASSESSMENT — PATIENT HEALTH QUESTIONNAIRE - PHQ9
SUM OF ALL RESPONSES TO PHQ QUESTIONS 1-9: 8
10. IF YOU CHECKED OFF ANY PROBLEMS, HOW DIFFICULT HAVE THESE PROBLEMS MADE IT FOR YOU TO DO YOUR WORK, TAKE CARE OF THINGS AT HOME, OR GET ALONG WITH OTHER PEOPLE: SOMEWHAT DIFFICULT
SUM OF ALL RESPONSES TO PHQ QUESTIONS 1-9: 8

## 2023-06-22 NOTE — PROGRESS NOTES
"Teetee Alvarado is a 38 year old  female who presents to clinic for a follow up OB visit. She is currently 37w1d with an estimated date of delivery of 2023 via 22-week ultrasound. She denies headache, chest pain, shortness of breath, abdominal pain/contractions, vaginal bleeding, or abnormal discharge. She continues to feel baby move.     New concerns today: As if she may be dilating.  Has noticed some brown discharge, previously finished metronidazole for BV.  Hoping to go into labor early or get induced early.  Scheduled for growth ultrasound  with MFM follow-up afterwards.  Has been feeling more anxious, particularly today.    OB History    Para Term  AB Living   1 0 0 0 0 0   SAB IAB Ectopic Multiple Live Births   0 0 0 0 0      # Outcome Date GA Lbr Jose F/2nd Weight Sex Delivery Anes PTL Lv   1 Current                Physical exam:  /84   Pulse 117   Ht 1.645 m (5' 4.75\")   Wt 129.7 kg (286 lb)   LMP  (LMP Unknown)   SpO2 97%   BMI 47.96 kg/m      General: alert, female in no acute distress  Abdomen: gravid uterus appropriate for gestation age at 38 cm above pubic symphysis, non-tender, FHTs 150  Extremities: Trace edema bilateral ankles  : Closed thick and high    Prenatal labs  Blood type: A+  Hgb: 13.6-->13.1z  Pap: Will need postpartum  Hep B, HIV, syphilisx2, gonorrhea, chlamydia, HIV negative  Rubella immune  UA/Ucx normal  Tdap 2023  Plans for epidural  Boy! Desires circumcision    Assessment/Plan:  High-risk pregnancy, primigravida of advanced maternal age in third trimester   Morbid obesity (H)  Body mass index is 47.96 kg/m . TWG 13 lbs. Recommended weight gain throughout pregnancy <15 pounds.        Insulin controlled gestational diabetes mellitus (GDM) in third trimester  Prediabetes  Following with endocrinology/diabetes education requiring high doses of basal/bolus insulin for GDM.   Most recent A1c 6.4%. BPP's have been reassuring. LGA infant noted at " 30-week ultrasound, though last growth ultrasound 6/1/2023 EFW 79 percentile, 2631 g.  Induction planned for 39 weeks due to maternal BMI greater than 40.  Additional growth ultrasound ordered along with MFM regarding induction timing.    GBS positive  Will need antibiotics while in labor.     HSV-2 seropositive  Started acyclovir 400 mg 3 times daily until delivery for HSV ppx.      Attention deficit hyperactivity disorder (ADHD), predominantly inattentive type  No longer on stimulant medication due to pregnancy.     Anxiety and depression  Notes increased anxiety symptoms.  Previously stopped sertraline when she found out she was pregnant, after discussion would be interested in restarting this.  Hydroxyzine also provided for as needed anxiety/sleep.     Follow up in 1 week for routine prenatal visit.     Rosa Frank, DO        Answers for HPI/ROS submitted by the patient on 6/15/2023  If you checked off any problems, how difficult have these problems made it for you to do your work, take care of things at home, or get along with other people?: Somewhat difficult  PHQ9 TOTAL SCORE: 12      Answers for HPI/ROS submitted by the patient on 6/22/2023  If you checked off any problems, how difficult have these problems made it for you to do your work, take care of things at home, or get along with other people?: Somewhat difficult  PHQ9 TOTAL SCORE: 8

## 2023-06-22 NOTE — LETTER
2023         RE: Teetee Alvarado  [address]  [place] [address]        Dear Colleague,    Thank you for referring your patient, Teetee Alvarado, to the [facility]. Please see a copy of my visit note below.    [facility]  ENDOCRINOLOGY    Gestational Diabetes 2023    Teetee Alvarado, 1985, [id number]          Reason for visit      1. Insulin controlled gestational diabetes mellitus (GDM) in third trimester        HPI     Teetee Alvarado is a very pleasant 38 year old old female who presents for GESTATIONAL Diabetes Mellitus.  She is currently 37w2d  pregnant . Due date is 23.  Diagnosed with GDM based on an OGTT. She hasnot had  GDM in prior pregnancies.   Current carbohydrate intake:consistent with recommendations of 30g-60g-60g.  I have reviewed her blood glucose logs and note that the:  Fasting readings  are:in range on current regimen  Postprandial readings are:in range on current regimen  Current Levemir dose: 46  Current Prandial insulin:42//42  Blood glucose logs/meter brought in and data reviewed and incorporated into decision-making.  Planned delivery at:   FABIANN: [facility]  Therapy/Interventions in the past:  She has been seen by the Diabetes Educator- and has received instruction on carbohydrate counting and  consistency.  Records from referring provider and other sources have also been reviewed and incorporated into decision-making.      TODAY:    Teetee is here in f/u for GDM.  Pt provides BG in the way of the Robert download. She is still having elevations postprandially, but things have improved.  On , she continues to eat a very high protein lunch with Barbecue sauce and then goes low. Baby is moving appropriately and she is having no swelling. She will be likely undergoing IOD, but will not find out until next week with an US determining how big baby is. Postpartum instructions given today and all questions answered to her  satisfaction. She will call with problems or concerns prior to delivery.     Past Medical History       Patient Active Problem List   Diagnosis     Attention deficit hyperactivity disorder (ADHD), predominantly inattentive type     PCOS (polycystic ovarian syndrome)     Anxiety and depression     Anal fistula     Gastroesophageal reflux disease     HSV-2 seropositive     Morbid obesity (H)     Prediabetes     Multigravida of advanced maternal age in second trimester     Elevated glucose tolerance test     Insulin controlled gestational diabetes mellitus (GDM) in third trimester     High-risk pregnancy, primigravida of advanced maternal age in third trimester     Encounter for triage in pregnant patient     Positive GBS test        Past Surgical History     Past Surgical History:   Procedure Laterality Date     ABSCESS DRAINAGE Left     left leg     LASIK       WISDOM TOOTH EXTRACTION         Family History     Family History   Problem Relation Age of Onset     Diabetes Type 2  Father      Arthritis Father        Social History     Social History     Tobacco Use     Smoking status: Every Day     Types: Vaping Device     Smokeless tobacco: Never     Tobacco comments:     vaping-daily   Substance Use Topics     Alcohol use: Not Currently     Drug use: Yes     Frequency: 7.0 times per week     Types: Marijuana     Comment: Drug use: daily       Review of Systems     Patient has no polyuria or polydipsia, no chest pain, dyspnea or TIA's, no numbness, tingling or pain in extremities  Remainder negative except as noted in HPI.      Vital Signs     /84 (BP Location: Right arm, Patient Position: Sitting, Cuff Size: Adult Large)   Pulse 111   Wt 130.4 kg (287 lb 8 oz)   LMP  (LMP Unknown)   SpO2 96%   BMI 48.21 kg/m    Wt Readings from Last 3 Encounters:   06/22/23 130.4 kg (287 lb 8 oz)   06/22/23 129.7 kg (286 lb)   06/15/23 130.6 kg (288 lb)       Physical Exam     GENERAL: Pleasant, alert, appropriate  appearance for age. No acute distress, obese  HEENT: Normocephalic, atraumatic  NECK: normal in appearance  CHEST/RESPIRATORY: Normal chest wall and respirations.   ABDOMEN: Gravid   SKIN: No melanosis,  ecchymosis,  vitiligo. No acanthosis nigricans  NEURO:  Non-focal, no tremor.  PSYCH: Alert and oriented -appropriate affect. Orientation, judgement and memory appear intact.  MSK: No joint abnormalities, FROM in all four extremities. No kyphosis      Assessment     1. Insulin controlled gestational diabetes mellitus (GDM) in third trimester        Plan       1. GESTATIONAL DIABETES-  Adjust dose as follows:     -Levemir [id number]   units. Increase by 2 units every 2 days to keep fasting blood glucose below 95mg/dL  -Novolog 42  units with breakfast  -Novolog 32 units with lunch   -Novolog 42 units with dinner     -Increase by 2 units every 2 days to keep 1 hour after meal blood glucose less than 140mg/dL     We reviewed glucose goals of fasting blood glucose <95 mg/dL and 1 hour post prandial blood glucose of <140 mg/dL.    Monitor blood sugar 4 times daily: Fasting  and 1 hour after each meal.  Contact  this clinic [phone] if blood glucose is not within the above-mentioned goals.      We discussed the importance of excellent glycemic control during pregnancy to limit complications such as fetal macrosomia, shoulder dystocia,  hypoglycemia and hyperbilirubinemia.  I have discussed the patient's increased risk of recurrent GDM and/or development of type 2 diabetes later in life.         F/up with A1c 3 months postpartum with PCP.    May be a candidate for metformin postpartum as she has more than 1 risk factor for future Type 2 Diabetes Mellitus.    She will need a screening A1c at least annually, TLC- including carbohydrate control and exercise.     After you deliver the baby, it is suggested to check your blood sugar occasionally (1 - 2 times per week) for 6 weeks.     When you are not pregnant,  normal blood sugars are:       Fasting (before eating anything in the morning)  - under 100 mg/dl    2 hours after meals under 140  The American Diabetes Association recommends:     a glucose tolerance test 6 weeks after you deliver the baby.         a hemoglobin Alc test yearly after delivery.  The Alc test is a 2-3 month average blood sugar reading.        Discuss getting these tests with your provider.       After delivery, and your provider has said that it is safe to be active, work toward getting 150 minutes each week of physical activity to decrease your risk of  getting type 2 diabetes.       Maintaining a healthy body weight will also decrease your risk of getting type 2 diabetes.       Key Sahu NP  6/23/2023        Lab Results     Microalbumin Urine mg/dL   Date Value Ref Range Status   05/19/2021 1.03 0.00 - 1.99 mg/dL Final       Cholesterol   Date Value Ref Range Status   11/03/2022 155 <200 mg/dL Final     Direct Measure HDL   Date Value Ref Range Status   11/03/2022 47 (L) >=50 mg/dL Final     Triglycerides   Date Value Ref Range Status   11/03/2022 99 <150 mg/dL Final       [unfilled]      Current Medications                                                                     Answers for HPI/ROS submitted by the patient on 6/22/2023  General Symptoms: No  Skin Symptoms: No  HENT Symptoms: No  EYE SYMPTOMS: No  HEART SYMPTOMS: No  LUNG SYMPTOMS: No  INTESTINAL SYMPTOMS: Yes  URINARY SYMPTOMS: Yes  GYNECOLOGIC SYMPTOMS: Yes  BREAST SYMPTOMS: Yes  SKELETAL SYMPTOMS: Yes  BLOOD SYMPTOMS: No  NERVOUS SYSTEM SYMPTOMS: No  MENTAL HEALTH SYMPTOMS: Yes  Heart burn or indigestion: Yes  Nausea: No  Vomiting: No  Abdominal pain: No  Bloating: No  Constipation: No  Diarrhea: No  Blood in stool: No  Black stools: No  Rectal or Anal pain: No  Fecal incontinence: No  Yellowing of skin or eyes: No  Vomit with blood: No  Change in stools: No  Trouble holding urine or incontinence: No  Pain or burning: No  Trouble  starting or stopping: No  Increased frequency of urination: Yes  Blood in urine: No  Decreased frequency of urination: No  Frequent nighttime urination: Yes  Flank pain: No  Difficulty emptying bladder: No  Bleeding or spotting between periods: Yes  Heavy or painful periods: No  Irregular periods: No  Vaginal discharge: No  Vaginal dryness: No  Genital ulcers: No  Reduced libido: Yes  Painful intercourse: No  Difficulty with sexual arousal: No  Post-menopausal bleeding: No  Discharge: Yes  Lumps: No  Pain: Yes  Nipple retraction: No  Back pain: No  Muscle aches: Yes  Neck pain: No  Swollen joints: Yes  Joint pain: Yes  Bone pain: No  Muscle cramps: Yes  Muscle weakness: No  Joint stiffness: No  Bone fracture: No  Nervous or Anxious: Yes  Depression: No  Trouble sleeping: Yes  Trouble thinking or concentrating: Yes  Mood changes: No  Panic attacks: No          Again, thank you for allowing me to participate in the care of your patient.        Sincerely,        Key Sahu NP

## 2023-06-23 ENCOUNTER — MYC MEDICAL ADVICE (OUTPATIENT)
Dept: FAMILY MEDICINE | Facility: CLINIC | Age: 38
End: 2023-06-23
Payer: COMMERCIAL

## 2023-06-23 ENCOUNTER — ANCILLARY PROCEDURE (OUTPATIENT)
Dept: ULTRASOUND IMAGING | Facility: HOSPITAL | Age: 38
End: 2023-06-23
Attending: OBSTETRICS & GYNECOLOGY
Payer: COMMERCIAL

## 2023-06-23 ENCOUNTER — OFFICE VISIT (OUTPATIENT)
Dept: MATERNAL FETAL MEDICINE | Facility: HOSPITAL | Age: 38
End: 2023-06-23
Attending: OBSTETRICS & GYNECOLOGY
Payer: COMMERCIAL

## 2023-06-23 DIAGNOSIS — O99.213 OBESITY AFFECTING PREGNANCY IN THIRD TRIMESTER: ICD-10-CM

## 2023-06-23 DIAGNOSIS — O24.414 INSULIN CONTROLLED GESTATIONAL DIABETES MELLITUS (GDM) IN THIRD TRIMESTER: Primary | ICD-10-CM

## 2023-06-23 DIAGNOSIS — O24.113 TYPE 2 DIABETES MELLITUS COMPLICATING PREGNANCY IN THIRD TRIMESTER, ANTEPARTUM: ICD-10-CM

## 2023-06-23 PROCEDURE — 76819 FETAL BIOPHYS PROFIL W/O NST: CPT | Mod: 26 | Performed by: OBSTETRICS & GYNECOLOGY

## 2023-06-23 PROCEDURE — 76819 FETAL BIOPHYS PROFIL W/O NST: CPT

## 2023-06-23 PROCEDURE — 99207 PR NO CHARGE LOS: CPT | Performed by: OBSTETRICS & GYNECOLOGY

## 2023-06-23 NOTE — NURSING NOTE
Patient reports good fetal movement, denies pain, regular contractions, leaking of fluid, or bleeding.  Reports blood sugar values fasting typically 111 and post prandial in the 130's.  Patient denies headache, visual changes, nausea/vomiting, epigastric pain related to preeclampsia.  SBAR given to SHUN URENA, see their note in Epic.

## 2023-06-26 ENCOUNTER — HOSPITAL ENCOUNTER (INPATIENT)
Facility: CLINIC | Age: 38
LOS: 4 days | Discharge: HOME | End: 2023-06-30
Attending: FAMILY MEDICINE | Admitting: FAMILY MEDICINE
Payer: COMMERCIAL

## 2023-06-26 DIAGNOSIS — Z98.891 S/P C-SECTION: ICD-10-CM

## 2023-06-26 PROBLEM — Z34.90 ENCOUNTER FOR PLANNED INDUCTION OF LABOR: Status: ACTIVE | Noted: 2023-06-26

## 2023-06-26 PROBLEM — Z22.330 GBS CARRIER: Status: ACTIVE | Noted: 2023-06-26

## 2023-06-26 LAB
ABO/RH(D): NORMAL
ANTIBODY SCREEN: NEGATIVE
GLUCOSE (EXTERNAL): 68 MG/DL (ref 70–99)
HGB BLD-MCNC: 12.9 G/DL (ref 11.7–15.7)
SPECIMEN EXPIRATION DATE: NORMAL
T PALLIDUM AB SER QL: NONREACTIVE

## 2023-06-26 PROCEDURE — 86780 TREPONEMA PALLIDUM: CPT

## 2023-06-26 PROCEDURE — 86850 RBC ANTIBODY SCREEN: CPT

## 2023-06-26 PROCEDURE — 86901 BLOOD TYPING SEROLOGIC RH(D): CPT

## 2023-06-26 PROCEDURE — 36415 COLL VENOUS BLD VENIPUNCTURE: CPT

## 2023-06-26 PROCEDURE — 120N000001 HC R&B MED SURG/OB

## 2023-06-26 PROCEDURE — 250N000013 HC RX MED GY IP 250 OP 250 PS 637

## 2023-06-26 PROCEDURE — 250N000013 HC RX MED GY IP 250 OP 250 PS 637: Performed by: FAMILY MEDICINE

## 2023-06-26 PROCEDURE — 85018 HEMOGLOBIN: CPT

## 2023-06-26 PROCEDURE — 99222 1ST HOSP IP/OBS MODERATE 55: CPT | Performed by: FAMILY MEDICINE

## 2023-06-26 RX ORDER — PENICILLIN G POTASSIUM 5000000 [IU]/1
5 INJECTION, POWDER, FOR SOLUTION INTRAMUSCULAR; INTRAVENOUS ONCE
Status: COMPLETED | OUTPATIENT
Start: 2023-06-26 | End: 2023-06-26

## 2023-06-26 RX ORDER — PENICILLIN G 3000000 [IU]/50ML
3 INJECTION, SOLUTION INTRAVENOUS EVERY 4 HOURS
Status: DISCONTINUED | OUTPATIENT
Start: 2023-06-26 | End: 2023-06-27

## 2023-06-26 RX ORDER — MISOPROSTOL 200 UG/1
800 TABLET ORAL
Status: DISCONTINUED | OUTPATIENT
Start: 2023-06-26 | End: 2023-06-27 | Stop reason: HOSPADM

## 2023-06-26 RX ORDER — CITRIC ACID/SODIUM CITRATE 334-500MG
30 SOLUTION, ORAL ORAL
Status: DISCONTINUED | OUTPATIENT
Start: 2023-06-26 | End: 2023-06-27 | Stop reason: HOSPADM

## 2023-06-26 RX ORDER — HYDROXYZINE HYDROCHLORIDE 25 MG/1
100 TABLET, FILM COATED ORAL
Status: DISCONTINUED | OUTPATIENT
Start: 2023-06-26 | End: 2023-06-27 | Stop reason: HOSPADM

## 2023-06-26 RX ORDER — NICOTINE POLACRILEX 4 MG
15-30 LOZENGE BUCCAL
Status: DISCONTINUED | OUTPATIENT
Start: 2023-06-26 | End: 2023-06-27 | Stop reason: HOSPADM

## 2023-06-26 RX ORDER — PROCHLORPERAZINE 25 MG
25 SUPPOSITORY, RECTAL RECTAL EVERY 12 HOURS PRN
Status: DISCONTINUED | OUTPATIENT
Start: 2023-06-26 | End: 2023-06-27 | Stop reason: HOSPADM

## 2023-06-26 RX ORDER — ACYCLOVIR 200 MG/1
400 CAPSULE ORAL EVERY 8 HOURS
Status: DISCONTINUED | OUTPATIENT
Start: 2023-06-26 | End: 2023-06-27

## 2023-06-26 RX ORDER — OXYTOCIN/0.9 % SODIUM CHLORIDE 30/500 ML
100-340 PLASTIC BAG, INJECTION (ML) INTRAVENOUS CONTINUOUS PRN
Status: DISCONTINUED | OUTPATIENT
Start: 2023-06-26 | End: 2023-06-27

## 2023-06-26 RX ORDER — DEXTROSE MONOHYDRATE 25 G/50ML
25-50 INJECTION, SOLUTION INTRAVENOUS
Status: DISCONTINUED | OUTPATIENT
Start: 2023-06-26 | End: 2023-06-27 | Stop reason: HOSPADM

## 2023-06-26 RX ORDER — MISOPROSTOL 100 UG/1
25 TABLET ORAL
Status: DISCONTINUED | OUTPATIENT
Start: 2023-06-26 | End: 2023-06-27

## 2023-06-26 RX ORDER — METOCLOPRAMIDE 10 MG/1
10 TABLET ORAL EVERY 6 HOURS PRN
Status: DISCONTINUED | OUTPATIENT
Start: 2023-06-26 | End: 2023-06-27 | Stop reason: HOSPADM

## 2023-06-26 RX ORDER — OXYTOCIN 10 [USP'U]/ML
10 INJECTION, SOLUTION INTRAMUSCULAR; INTRAVENOUS
Status: DISCONTINUED | OUTPATIENT
Start: 2023-06-26 | End: 2023-06-27

## 2023-06-26 RX ORDER — NICOTINE POLACRILEX 4 MG
15-30 LOZENGE BUCCAL
Status: DISCONTINUED | OUTPATIENT
Start: 2023-06-26 | End: 2023-06-26 | Stop reason: HOSPADM

## 2023-06-26 RX ORDER — OXYTOCIN 10 [USP'U]/ML
10 INJECTION, SOLUTION INTRAMUSCULAR; INTRAVENOUS
Status: DISCONTINUED | OUTPATIENT
Start: 2023-06-26 | End: 2023-06-27 | Stop reason: HOSPADM

## 2023-06-26 RX ORDER — NALOXONE HYDROCHLORIDE 0.4 MG/ML
0.2 INJECTION, SOLUTION INTRAMUSCULAR; INTRAVENOUS; SUBCUTANEOUS
Status: DISCONTINUED | OUTPATIENT
Start: 2023-06-26 | End: 2023-06-27 | Stop reason: HOSPADM

## 2023-06-26 RX ORDER — SERTRALINE HYDROCHLORIDE 25 MG/1
25 TABLET, FILM COATED ORAL DAILY
Status: DISCONTINUED | OUTPATIENT
Start: 2023-06-27 | End: 2023-06-30 | Stop reason: HOSPADM

## 2023-06-26 RX ORDER — METHYLERGONOVINE MALEATE 0.2 MG/ML
200 INJECTION INTRAVENOUS
Status: DISCONTINUED | OUTPATIENT
Start: 2023-06-26 | End: 2023-06-27 | Stop reason: HOSPADM

## 2023-06-26 RX ORDER — NALOXONE HYDROCHLORIDE 0.4 MG/ML
0.4 INJECTION, SOLUTION INTRAMUSCULAR; INTRAVENOUS; SUBCUTANEOUS
Status: DISCONTINUED | OUTPATIENT
Start: 2023-06-26 | End: 2023-06-27 | Stop reason: HOSPADM

## 2023-06-26 RX ORDER — MORPHINE SULFATE 10 MG/ML
10 INJECTION, SOLUTION INTRAMUSCULAR; INTRAVENOUS
Status: DISCONTINUED | OUTPATIENT
Start: 2023-06-26 | End: 2023-06-27 | Stop reason: HOSPADM

## 2023-06-26 RX ORDER — METOCLOPRAMIDE HYDROCHLORIDE 5 MG/ML
10 INJECTION INTRAMUSCULAR; INTRAVENOUS EVERY 6 HOURS PRN
Status: DISCONTINUED | OUTPATIENT
Start: 2023-06-26 | End: 2023-06-27 | Stop reason: HOSPADM

## 2023-06-26 RX ORDER — OXYTOCIN/0.9 % SODIUM CHLORIDE 30/500 ML
340 PLASTIC BAG, INJECTION (ML) INTRAVENOUS CONTINUOUS PRN
Status: DISCONTINUED | OUTPATIENT
Start: 2023-06-26 | End: 2023-06-27 | Stop reason: HOSPADM

## 2023-06-26 RX ORDER — MISOPROSTOL 200 UG/1
400 TABLET ORAL
Status: DISCONTINUED | OUTPATIENT
Start: 2023-06-26 | End: 2023-06-27 | Stop reason: HOSPADM

## 2023-06-26 RX ORDER — IBUPROFEN 800 MG/1
800 TABLET, FILM COATED ORAL
Status: DISCONTINUED | OUTPATIENT
Start: 2023-06-26 | End: 2023-06-30 | Stop reason: HOSPADM

## 2023-06-26 RX ORDER — CARBOPROST TROMETHAMINE 250 UG/ML
250 INJECTION, SOLUTION INTRAMUSCULAR
Status: DISCONTINUED | OUTPATIENT
Start: 2023-06-26 | End: 2023-06-27 | Stop reason: HOSPADM

## 2023-06-26 RX ORDER — DEXTROSE MONOHYDRATE 25 G/50ML
25-50 INJECTION, SOLUTION INTRAVENOUS
Status: DISCONTINUED | OUTPATIENT
Start: 2023-06-26 | End: 2023-06-26

## 2023-06-26 RX ORDER — DEXTROSE MONOHYDRATE 25 G/50ML
25-50 INJECTION, SOLUTION INTRAVENOUS
Status: DISCONTINUED | OUTPATIENT
Start: 2023-06-26 | End: 2023-06-26 | Stop reason: HOSPADM

## 2023-06-26 RX ORDER — ONDANSETRON 4 MG/1
4 TABLET, ORALLY DISINTEGRATING ORAL EVERY 6 HOURS PRN
Status: DISCONTINUED | OUTPATIENT
Start: 2023-06-26 | End: 2023-06-27 | Stop reason: HOSPADM

## 2023-06-26 RX ORDER — ONDANSETRON 2 MG/ML
4 INJECTION INTRAMUSCULAR; INTRAVENOUS EVERY 6 HOURS PRN
Status: DISCONTINUED | OUTPATIENT
Start: 2023-06-26 | End: 2023-06-27 | Stop reason: HOSPADM

## 2023-06-26 RX ORDER — NICOTINE POLACRILEX 4 MG
15-30 LOZENGE BUCCAL
Status: DISCONTINUED | OUTPATIENT
Start: 2023-06-26 | End: 2023-06-26

## 2023-06-26 RX ORDER — KETOROLAC TROMETHAMINE 30 MG/ML
30 INJECTION, SOLUTION INTRAMUSCULAR; INTRAVENOUS
Status: DISCONTINUED | OUTPATIENT
Start: 2023-06-26 | End: 2023-06-30 | Stop reason: HOSPADM

## 2023-06-26 RX ORDER — PROCHLORPERAZINE MALEATE 10 MG
10 TABLET ORAL EVERY 6 HOURS PRN
Status: DISCONTINUED | OUTPATIENT
Start: 2023-06-26 | End: 2023-06-27 | Stop reason: HOSPADM

## 2023-06-26 RX ADMIN — MISOPROSTOL 25 MCG: 100 TABLET ORAL at 10:53

## 2023-06-26 RX ADMIN — MISOPROSTOL 25 MCG: 100 TABLET ORAL at 23:08

## 2023-06-26 RX ADMIN — ACYCLOVIR 400 MG: 200 CAPSULE ORAL at 17:24

## 2023-06-26 RX ADMIN — NICOTINE 7 MG/24 HR DAILY TRANSDERMAL PATCH 1 PATCH: at 11:49

## 2023-06-26 RX ADMIN — MISOPROSTOL 25 MCG: 100 TABLET ORAL at 14:02

## 2023-06-26 RX ADMIN — MISOPROSTOL 25 MCG: 100 TABLET ORAL at 17:24

## 2023-06-26 ASSESSMENT — ACTIVITIES OF DAILY LIVING (ADL)
CHANGE_IN_FUNCTIONAL_STATUS_SINCE_ONSET_OF_CURRENT_ILLNESS/INJURY: NO
ADLS_ACUITY_SCORE: 18
FALL_HISTORY_WITHIN_LAST_SIX_MONTHS: NO
ADLS_ACUITY_SCORE: 18
ADLS_ACUITY_SCORE: 18
DOING_ERRANDS_INDEPENDENTLY_DIFFICULTY: NO
CONCENTRATING,_REMEMBERING_OR_MAKING_DECISIONS_DIFFICULTY: NO
WALKING_OR_CLIMBING_STAIRS_DIFFICULTY: NO
WEAR_GLASSES_OR_BLIND: NO
TOILETING_ISSUES: NO
ADLS_ACUITY_SCORE: 18
DIFFICULTY_EATING/SWALLOWING: NO
ADLS_ACUITY_SCORE: 18
DRESSING/BATHING_DIFFICULTY: NO
ADLS_ACUITY_SCORE: 18

## 2023-06-26 NOTE — PLAN OF CARE
"  Problem: Plan of Care - These are the overarching goals to be used throughout the patient stay.    Goal: Plan of Care Review  Description: The Plan of Care Review/Shift note should be completed every shift.  The Outcome Evaluation is a brief statement about your assessment that the patient is improving, declining, or no change.  This information will be displayed automatically on your shift note.  Outcome: Progressing  Flowsheets (Taken 6/26/2023 1350)  Plan of Care Reviewed With:    patient    spouse     Problem: Plan of Care - These are the overarching goals to be used throughout the patient stay.    Goal: Patient-Specific Goal (Individualized)  Description: You can add care plan individualizations to a care plan. Examples of Individualization might be:  \"Parent requests to be called daily at 9am for status\", \"I have a hard time hearing out of my right ear\", or \"Do not touch me to wake me up as it startles me\".  Outcome: Progressing     Problem: Labor  Goal: Acceptable Pain Control  Outcome: Progressing     Problem: Infection  Goal: Absence of Infection Signs and Symptoms  Outcome: Progressing     Problem: Fall Injury Risk  Goal: Absence of Fall and Fall-Related Injury  Outcome: Progressing   Goal Outcome Evaluation:      Plan of Care Reviewed With: patient, spouse  Patient resting in bed, moderate variability with accel, difficulty monitoring fetal, external monitors continue readjustment, mild to palpate contractions. Patient voiding without difficulty, AMA GDMA2 blood sugar 121.  GBS positive.  HSV positive, bright light on admission was negative.  OB resident at bedside, bedside U/S confirmed vertex, two doses of oral Cytotec given, VS stable, handoff to Nuha RESENDIZ.               "

## 2023-06-26 NOTE — H&P
OBSTETRICS ADMISSION HISTORY & PHYSICAL  DATE OF ADMISSION: 2023  7:25 AM        Assessment and Plan:   Assessment:  Teetee Alvarado is a 38 year old  at 37w5d not in labor. Membranes are intact. GBS status is positive.  Patient presents to the hospital today for induction of labor due to gestational diabetes, advanced maternal age, obesity.  Difficult to control gestational diabetes on insulin.  A1c 6.4.  Patient had late onset of prenatal care at around 22 weeks.  BMI 48.    Patient Active Problem List   Diagnosis     Attention deficit hyperactivity disorder (ADHD), predominantly inattentive type     PCOS (polycystic ovarian syndrome)     Anxiety and depression     Anal fistula     Gastroesophageal reflux disease     HSV-2 seropositive     Morbid obesity (H)     Prediabetes     Multigravida of advanced maternal age in second trimester     Elevated glucose tolerance test     Insulin controlled gestational diabetes mellitus (GDM) in third trimester     High-risk pregnancy, primigravida of advanced maternal age in third trimester     Encounter for triage in pregnant patient     Positive GBS test        PLAN:   1. Admit - see IP orders  2. cervical ripening with misoprostol  3. Pain medication hydroxyzine and morphine  4. Prophylactic antibiotic for + GBS status  5. Anticipate   6. GBS: positive. Antibiotics are indicated: Penicillin  7. Comfort plan: Epidural  8. Nicotine patch due to daily vape use  9. Will monitor labor progress along with RN and update attending physician  10. Hospitalist consult for difficult to control gestational diabetes  11. Will notify Rosa Quiroz.    Patient discussed with attending physician, Dr. Rosa Frank , who agrees with the plan.     Hong Pacheco MD PGY-2,  2023  [facility] Medicine Residency Program         Chief Complaint:     Induction of labor for gestational diabetes and advanced maternal age.       History of Present  Illness:     Teetee Alvarado is a 38 year old year old  at 37w5d confirmed by 22-week ultrasound. Patient received prenatal care with Rosa Frank at [facility].    Presents to the [facility] for induction of labor, indication poorly controlled GDM, AMA, morbid obesity.    They report no contractions. Theydenies fluid leakage. They denies bleeding per vagina. Fetal movement is normal.    Their prenatal course has been complicated by  gestational diabetes, A2 (controlled on insulin or po meds) and AMA and morbid obesity.    Prenatal labs   Lab Results   Component Value Date    AS Negative 2023    HEPBANG Nonreactive 2023    CHPCRT Negative 2023    GCPCRT Negative 2023    HGB 13.2 06/15/2023       GBS was collected on 6/15/23.  Weight gain during pregnancy: /4.99 kg (11 lb)         Obstetrical History:     OB History    Para Term  AB Living   1 0 0 0 0 0   SAB IAB Ectopic Multiple Live Births   0 0 0 0 0      # Outcome Date GA Lbr Jose F/2nd Weight Sex Delivery Anes PTL Lv   1 Current                       Immunzations:     Most Recent Immunizations   Administered Date(s) Administered     COVID-19 Bivalent 12+ (Pfizer) 2022     COVID-19 MONOVALENT 12+ (Pfizer) 2021     COVID-19 Monovalent 12+ (Pfizer ) 2022     DTAP (<7y) 1990     Flu, Unspecified 2020     HPV9 2009     Hepatitis A (ADULT 19+) 2015     Hepatits B (Peds <19Y) 10/11/1999     Influenza Vaccine >6 months (Alfuria,Fluzone) 2021     Influenza Vaccine Im 4yrs+ 4 Valent CCIIV4 10/14/2022     Influenza Vaccine, 6+MO IM (QUADRIVALENT W/PRESERVATIVES) 09/15/2020     MMR 1990     Mantoux Tuberculin Skin Test 2016     Meningococcal (Menomune ) 2004     OPV, trivalent, live 1990     Pneumococcal 23 valent 10/16/2018     TDAP (Adacel,Boostrix) 2023     Td (Adult), Adsorbed 10/06/2009     Tdap this pregnancy?  YES - Date: 2023  Flu  shot this pregnancy?YES - Date: 10/14/2022  COVID vaccine? YES - Date: 11/3/2022         Past Medical History:   No past medical history on file.         Past Surgical History:     Past Surgical History:   Procedure Laterality Date     ABSCESS DRAINAGE Left     left leg     LASIK       WISDOM TOOTH EXTRACTION              Family History:     Family History   Problem Relation Age of Onset     Diabetes Type 2  Father      Arthritis Father             Social History:   no alcohol use  no illicit drug use  Daily vape user         Medications:   No current facility-administered medications on file prior to encounter.  acyclovir (ZOVIRAX) 400 MG tablet, Take 1 tablet (400 mg) by mouth every 8 hours  aspirin 81 MG EC tablet, Take 81 mg by mouth daily  Continuous Blood Gluc Sensor (FREESTYLE KATHE 3 SENSOR) MISC, 1 kit every 14 days Use per manufacture's directions to check glucose daily.  hydrOXYzine (ATARAX) 50 MG tablet, Take 1 tablet (50 mg) by mouth 3 times daily as needed for anxiety  insulin aspart (NOVOLOG PEN) 100 UNIT/ML pen, Breakfast and dinner take 42 units, lunch 32 units. Max daily dose 150 units.  insulin detemir (LEVEMIR PEN) 100 UNIT/ML pen, Inject 46 Units Subcutaneous At Bedtime Increase as directed. Max daily dose 60 units.  insulin pen needle (31G X 8 MM) 31G X 8 MM miscellaneous, Use 1 pen needles daily or as directed.  Microlet Lancets MISC, 1 each 4 times daily  Prenatal Vit-Fe Fumarate-FA (PRENATAL MULTIVITAMIN W/IRON) 27-0.8 MG tablet, Take 1 tablet by mouth daily  sertraline (ZOLOFT) 25 MG tablet, Take 1 tablet (25 mg) by mouth daily             Allergies:   Patient has no known allergies.         Review of Systems:   CONSTITUTIONAL: no fatigue, no unexpected change in weight  SKIN: no worrisome rashes or lesions  EYES: no acute vision problems or changes  ENT: no ear problems, no mouth problems, no throat problems  RESP: no significant cough, no shortness of breath  CV: no chest pain, no  "palpitations, no new or worsening peripheral edema  GI: no nausea, no vomiting, no constipation, no diarrhea  : no frequency, no dysuria, no hematuria  NEURO: no weakness, no dizziness, no headaches  ENDOCRINE: no temperature intolerance, no skin/hair changes  PSYCHIATRIC: NEGATIVE for changes in mood or trouble with sleep         Physical Exam:   Vitals:   LMP  (LMP Unknown)   0 lbs 0 oz  Estimated body mass index is 48.21 kg/m  as calculated from the following:    Height as of 6/22/23: 1.645 m (5' 4.75\").    Weight as of 6/22/23: 130.4 kg (287 lb 8 oz).    GEN: Awake, alert in no apparent distress   HEENT: grossly normal  NECK: no lymphadenopathy or thryoidomegaly  RESPIRATORY: clear to auscultation bilaterally, no increased work of breathing  BACK:  no costovertebral angle tenderness   CARDIOVASCULAR: RRR, no murmur  ABDOMEN: gravid  PELVIC:  no fluid noted, no blood noted.    Cervix: 0 cm/0%/-3  EXT:  no edema or calf tenderness  EFW on Exam: 2631 g based on ultrasound from 6/1/2023 (79th percentile)  Confirmed VTX by Ultrasound?  Yes    Electronic Fetal Monitoring:  Difficulty picking up baby on monitor due to maternal habitus.  Baseline rate normal  Variability moderate  Accelerations present  Decelerations not present      Assessment: Category I EFM interpretation suggests absence of concern for fetal metabolic acidemia at this time due to accelerations present, decelerations absent and heart rate: normal baseline    Uterine Activity none.      Strip reviewed remotely via [facility]    NST interpretation:  Baseline rate 135 normal  Accelerations present  Decelerations not present  Interpretation: reactive  "

## 2023-06-26 NOTE — PROGRESS NOTES
Pt admitted for induction. Pt. GDMA2, AMA, GBS positive and HSV positive. Pt wanted to ambulate in room and frequent position changes, baby also moving,  Making it difficult to monitor FHT.Attempted adjusting monitor frequently. Exchanged for new US adaptor, and have inquired to use the SlapVid for tracing, unavailable at this time.    BS @ 3382- 67. Improved with dinner. 1900 blood sugar was 135.   Report given to Veronica

## 2023-06-27 ENCOUNTER — ANESTHESIA (OUTPATIENT)
Dept: SURGERY | Facility: CLINIC | Age: 38
End: 2023-06-27
Payer: COMMERCIAL

## 2023-06-27 ENCOUNTER — ANESTHESIA EVENT (OUTPATIENT)
Dept: SURGERY | Facility: CLINIC | Age: 38
End: 2023-06-27
Payer: COMMERCIAL

## 2023-06-27 LAB
ABO/RH(D): NORMAL
AMPHETAMINES UR QL SCN: NORMAL
ANTIBODY SCREEN: NEGATIVE
APTT PPP: 27 SECONDS (ref 22–38)
BARBITURATES UR QL: NORMAL
BENZODIAZ UR QL: NORMAL
CANNABINOIDS UR QL SCN: NORMAL
COCAINE UR QL: NORMAL
CREAT UR-MCNC: 108 MG/DL
D DIMER PPP FEU-MCNC: 0.83 UG/ML FEU (ref 0–0.5)
ERYTHROCYTE [DISTWIDTH] IN BLOOD BY AUTOMATED COUNT: 13.2 % (ref 10–15)
FIBRINOGEN PPP-MCNC: 620 MG/DL (ref 170–490)
GLUCOSE BLDC GLUCOMTR-MCNC: 180 MG/DL (ref 70–99)
GLUCOSE BLDC GLUCOMTR-MCNC: 218 MG/DL (ref 70–99)
GLUCOSE BLDC GLUCOMTR-MCNC: 60 MG/DL
GLUCOSE BLDC GLUCOMTR-MCNC: 60 MG/DL
GLUCOSE BLDC GLUCOMTR-MCNC: 62 MG/DL
GLUCOSE BLDC GLUCOMTR-MCNC: 92 MG/DL
GLUCOSE BLDC GLUCOMTR-MCNC: 93 MG/DL (ref 70–99)
GLUCOSE BLDC GLUCOMTR-MCNC: 93 MG/DL (ref 70–99)
GLUCOSE SERPL-MCNC: 60 MG/DL (ref 70–99)
GLUCOSE SERPL-MCNC: 97 MG/DL (ref 70–99)
HCT VFR BLD AUTO: 35.6 % (ref 35–47)
HGB BLD-MCNC: 12.4 G/DL (ref 11.7–15.7)
HGB BLD-MCNC: 12.7 G/DL (ref 11.7–15.7)
HOLD SPECIMEN: NORMAL
INR PPP: 0.99 (ref 0.85–1.15)
MCH RBC QN AUTO: 30.5 PG (ref 26.5–33)
MCHC RBC AUTO-ENTMCNC: 35.7 G/DL (ref 31.5–36.5)
MCV RBC AUTO: 85 FL (ref 78–100)
METHADONE UR QL SCN: NORMAL
OPIATES UR QL SCN: NORMAL
OXYCODONE UR QL: NORMAL
PCP UR QL SCN: NORMAL
PLATELET # BLD AUTO: 244 10E3/UL (ref 150–450)
PLATELET # BLD AUTO: 272 10E3/UL (ref 150–450)
RBC # BLD AUTO: 4.17 10E6/UL (ref 3.8–5.2)
SPECIMEN EXPIRATION DATE: NORMAL
T PALLIDUM AB SER QL: NONREACTIVE
WBC # BLD AUTO: 16.7 10E3/UL (ref 4–11)

## 2023-06-27 PROCEDURE — 85384 FIBRINOGEN ACTIVITY: CPT | Performed by: OBSTETRICS & GYNECOLOGY

## 2023-06-27 PROCEDURE — 250N000009 HC RX 250: Performed by: OBSTETRICS & GYNECOLOGY

## 2023-06-27 PROCEDURE — 250N000013 HC RX MED GY IP 250 OP 250 PS 637: Performed by: FAMILY MEDICINE

## 2023-06-27 PROCEDURE — 85049 AUTOMATED PLATELET COUNT: CPT | Performed by: OBSTETRICS & GYNECOLOGY

## 2023-06-27 PROCEDURE — 999N000016 HC STATISTIC ATTENDANCE AT DELIVERY

## 2023-06-27 PROCEDURE — 86780 TREPONEMA PALLIDUM: CPT | Performed by: OBSTETRICS & GYNECOLOGY

## 2023-06-27 PROCEDURE — 00HU33Z INSERTION OF INFUSION DEVICE INTO SPINAL CANAL, PERCUTANEOUS APPROACH: ICD-10-PCS | Performed by: ANESTHESIOLOGY

## 2023-06-27 PROCEDURE — 250N000013 HC RX MED GY IP 250 OP 250 PS 637: Performed by: OBSTETRICS & GYNECOLOGY

## 2023-06-27 PROCEDURE — 360N000076 HC SURGERY LEVEL 3, PER MIN: Performed by: OBSTETRICS & GYNECOLOGY

## 2023-06-27 PROCEDURE — 250N000011 HC RX IP 250 OP 636: Mod: JZ | Performed by: OBSTETRICS & GYNECOLOGY

## 2023-06-27 PROCEDURE — 250N000009 HC RX 250: Performed by: NURSE ANESTHETIST, CERTIFIED REGISTERED

## 2023-06-27 PROCEDURE — 85610 PROTHROMBIN TIME: CPT | Performed by: OBSTETRICS & GYNECOLOGY

## 2023-06-27 PROCEDURE — 86901 BLOOD TYPING SEROLOGIC RH(D): CPT | Performed by: FAMILY MEDICINE

## 2023-06-27 PROCEDURE — 250N000011 HC RX IP 250 OP 636: Performed by: OBSTETRICS & GYNECOLOGY

## 2023-06-27 PROCEDURE — 370N000017 HC ANESTHESIA TECHNICAL FEE, PER MIN: Performed by: OBSTETRICS & GYNECOLOGY

## 2023-06-27 PROCEDURE — 86850 RBC ANTIBODY SCREEN: CPT | Performed by: FAMILY MEDICINE

## 2023-06-27 PROCEDURE — 59514 CESAREAN DELIVERY ONLY: CPT | Mod: 80 | Performed by: FAMILY MEDICINE

## 2023-06-27 PROCEDURE — 85379 FIBRIN DEGRADATION QUANT: CPT | Performed by: OBSTETRICS & GYNECOLOGY

## 2023-06-27 PROCEDURE — 250N000013 HC RX MED GY IP 250 OP 250 PS 637

## 2023-06-27 PROCEDURE — 258N000003 HC RX IP 258 OP 636: Performed by: NURSE ANESTHETIST, CERTIFIED REGISTERED

## 2023-06-27 PROCEDURE — 258N000003 HC RX IP 258 OP 636

## 2023-06-27 PROCEDURE — 120N000001 HC R&B MED SURG/OB

## 2023-06-27 PROCEDURE — 250N000011 HC RX IP 250 OP 636: Mod: JZ | Performed by: NURSE ANESTHETIST, CERTIFIED REGISTERED

## 2023-06-27 PROCEDURE — 85018 HEMOGLOBIN: CPT | Performed by: OBSTETRICS & GYNECOLOGY

## 2023-06-27 PROCEDURE — 250N000011 HC RX IP 250 OP 636: Mod: JZ | Performed by: FAMILY MEDICINE

## 2023-06-27 PROCEDURE — 3E0R3BZ INTRODUCTION OF ANESTHETIC AGENT INTO SPINAL CANAL, PERCUTANEOUS APPROACH: ICD-10-PCS | Performed by: ANESTHESIOLOGY

## 2023-06-27 PROCEDURE — 250N000011 HC RX IP 250 OP 636: Performed by: ANESTHESIOLOGY

## 2023-06-27 PROCEDURE — 85730 THROMBOPLASTIN TIME PARTIAL: CPT | Performed by: OBSTETRICS & GYNECOLOGY

## 2023-06-27 PROCEDURE — 258N000001 HC RX 258

## 2023-06-27 PROCEDURE — 85027 COMPLETE CBC AUTOMATED: CPT | Performed by: FAMILY MEDICINE

## 2023-06-27 PROCEDURE — 80307 DRUG TEST PRSMV CHEM ANLYZR: CPT | Performed by: FAMILY MEDICINE

## 2023-06-27 PROCEDURE — 36415 COLL VENOUS BLD VENIPUNCTURE: CPT | Performed by: OBSTETRICS & GYNECOLOGY

## 2023-06-27 PROCEDURE — 36415 COLL VENOUS BLD VENIPUNCTURE: CPT | Performed by: FAMILY MEDICINE

## 2023-06-27 PROCEDURE — 258N000003 HC RX IP 258 OP 636: Performed by: OBSTETRICS & GYNECOLOGY

## 2023-06-27 PROCEDURE — 272N000001 HC OR GENERAL SUPPLY STERILE: Performed by: OBSTETRICS & GYNECOLOGY

## 2023-06-27 RX ORDER — AMOXICILLIN 250 MG
1 CAPSULE ORAL 2 TIMES DAILY
Status: DISCONTINUED | OUTPATIENT
Start: 2023-06-27 | End: 2023-06-30 | Stop reason: HOSPADM

## 2023-06-27 RX ORDER — METHYLERGONOVINE MALEATE 0.2 MG/ML
200 INJECTION INTRAVENOUS
Status: DISCONTINUED | OUTPATIENT
Start: 2023-06-27 | End: 2023-06-27 | Stop reason: HOSPADM

## 2023-06-27 RX ORDER — CARBOPROST TROMETHAMINE 250 UG/ML
250 INJECTION, SOLUTION INTRAMUSCULAR
Status: DISCONTINUED | OUTPATIENT
Start: 2023-06-27 | End: 2023-06-27 | Stop reason: HOSPADM

## 2023-06-27 RX ORDER — OXYTOCIN/0.9 % SODIUM CHLORIDE 30/500 ML
340 PLASTIC BAG, INJECTION (ML) INTRAVENOUS CONTINUOUS PRN
Status: DISCONTINUED | OUTPATIENT
Start: 2023-06-27 | End: 2023-06-27 | Stop reason: HOSPADM

## 2023-06-27 RX ORDER — LIDOCAINE HCL/EPINEPHRINE/PF 2%-1:200K
VIAL (ML) INJECTION PRN
Status: DISCONTINUED | OUTPATIENT
Start: 2023-06-27 | End: 2023-06-27

## 2023-06-27 RX ORDER — ACETAMINOPHEN 325 MG/1
975 TABLET ORAL ONCE
Status: COMPLETED | OUTPATIENT
Start: 2023-06-27 | End: 2023-06-27

## 2023-06-27 RX ORDER — MAGNESIUM HYDROXIDE 1200 MG/15ML
LIQUID ORAL PRN
Status: DISCONTINUED | OUTPATIENT
Start: 2023-06-27 | End: 2023-06-27 | Stop reason: HOSPADM

## 2023-06-27 RX ORDER — NALOXONE HYDROCHLORIDE 0.4 MG/ML
0.2 INJECTION, SOLUTION INTRAMUSCULAR; INTRAVENOUS; SUBCUTANEOUS
Status: DISCONTINUED | OUTPATIENT
Start: 2023-06-27 | End: 2023-06-30 | Stop reason: HOSPADM

## 2023-06-27 RX ORDER — MORPHINE SULFATE 0.5 MG/ML
INJECTION, SOLUTION EPIDURAL; INTRATHECAL; INTRAVENOUS PRN
Status: DISCONTINUED | OUTPATIENT
Start: 2023-06-27 | End: 2023-06-27

## 2023-06-27 RX ORDER — PROCHLORPERAZINE 25 MG
25 SUPPOSITORY, RECTAL RECTAL EVERY 12 HOURS PRN
Status: DISCONTINUED | OUTPATIENT
Start: 2023-06-27 | End: 2023-06-30 | Stop reason: HOSPADM

## 2023-06-27 RX ORDER — CHLOROPROCAINE HYDROCHLORIDE 30 MG/ML
INJECTION, SOLUTION EPIDURAL; INFILTRATION; INTRACAUDAL; PERINEURAL PRN
Status: DISCONTINUED | OUTPATIENT
Start: 2023-06-27 | End: 2023-06-27

## 2023-06-27 RX ORDER — OXYTOCIN/0.9 % SODIUM CHLORIDE 30/500 ML
PLASTIC BAG, INJECTION (ML) INTRAVENOUS
Status: DISCONTINUED
Start: 2023-06-27 | End: 2023-06-27 | Stop reason: HOSPADM

## 2023-06-27 RX ORDER — ONDANSETRON 2 MG/ML
4 INJECTION INTRAMUSCULAR; INTRAVENOUS EVERY 6 HOURS PRN
Status: DISCONTINUED | OUTPATIENT
Start: 2023-06-27 | End: 2023-06-30 | Stop reason: HOSPADM

## 2023-06-27 RX ORDER — ONDANSETRON 2 MG/ML
4 INJECTION INTRAMUSCULAR; INTRAVENOUS EVERY 6 HOURS PRN
Status: DISCONTINUED | OUTPATIENT
Start: 2023-06-27 | End: 2023-06-27

## 2023-06-27 RX ORDER — MISOPROSTOL 200 UG/1
800 TABLET ORAL
Status: DISCONTINUED | OUTPATIENT
Start: 2023-06-27 | End: 2023-06-30 | Stop reason: HOSPADM

## 2023-06-27 RX ORDER — IBUPROFEN 800 MG/1
800 TABLET, FILM COATED ORAL EVERY 6 HOURS
Status: DISCONTINUED | OUTPATIENT
Start: 2023-06-29 | End: 2023-06-30 | Stop reason: HOSPADM

## 2023-06-27 RX ORDER — AZITHROMYCIN 500 MG/5ML
500 INJECTION, POWDER, LYOPHILIZED, FOR SOLUTION INTRAVENOUS
Status: COMPLETED | OUTPATIENT
Start: 2023-06-27 | End: 2023-06-27

## 2023-06-27 RX ORDER — ACETAMINOPHEN 325 MG/1
975 TABLET ORAL EVERY 6 HOURS
Status: DISCONTINUED | OUTPATIENT
Start: 2023-06-27 | End: 2023-06-30 | Stop reason: HOSPADM

## 2023-06-27 RX ORDER — SODIUM CHLORIDE, SODIUM LACTATE, POTASSIUM CHLORIDE, CALCIUM CHLORIDE 600; 310; 30; 20 MG/100ML; MG/100ML; MG/100ML; MG/100ML
INJECTION, SOLUTION INTRAVENOUS CONTINUOUS
Status: DISCONTINUED | OUTPATIENT
Start: 2023-06-27 | End: 2023-06-30 | Stop reason: HOSPADM

## 2023-06-27 RX ORDER — KETOROLAC TROMETHAMINE 30 MG/ML
30 INJECTION, SOLUTION INTRAMUSCULAR; INTRAVENOUS EVERY 6 HOURS
Status: COMPLETED | OUTPATIENT
Start: 2023-06-28 | End: 2023-06-28

## 2023-06-27 RX ORDER — TERBUTALINE SULFATE 1 MG/ML
0.25 INJECTION, SOLUTION SUBCUTANEOUS ONCE
Status: COMPLETED | OUTPATIENT
Start: 2023-06-27 | End: 2023-06-27

## 2023-06-27 RX ORDER — KETOROLAC TROMETHAMINE 30 MG/ML
INJECTION, SOLUTION INTRAMUSCULAR; INTRAVENOUS PRN
Status: DISCONTINUED | OUTPATIENT
Start: 2023-06-27 | End: 2023-06-27

## 2023-06-27 RX ORDER — LIDOCAINE 40 MG/G
CREAM TOPICAL
Status: DISCONTINUED | OUTPATIENT
Start: 2023-06-27 | End: 2023-06-27 | Stop reason: HOSPADM

## 2023-06-27 RX ORDER — ONDANSETRON 4 MG/1
4 TABLET, ORALLY DISINTEGRATING ORAL EVERY 6 HOURS PRN
Status: DISCONTINUED | OUTPATIENT
Start: 2023-06-27 | End: 2023-06-30 | Stop reason: HOSPADM

## 2023-06-27 RX ORDER — OXYTOCIN 10 [USP'U]/ML
10 INJECTION, SOLUTION INTRAMUSCULAR; INTRAVENOUS
Status: DISCONTINUED | OUTPATIENT
Start: 2023-06-27 | End: 2023-06-27 | Stop reason: HOSPADM

## 2023-06-27 RX ORDER — OXYTOCIN 10 [USP'U]/ML
10 INJECTION, SOLUTION INTRAMUSCULAR; INTRAVENOUS
Status: DISCONTINUED | OUTPATIENT
Start: 2023-06-27 | End: 2023-06-30 | Stop reason: HOSPADM

## 2023-06-27 RX ORDER — AMOXICILLIN 250 MG
2 CAPSULE ORAL 2 TIMES DAILY
Status: DISCONTINUED | OUTPATIENT
Start: 2023-06-27 | End: 2023-06-30 | Stop reason: HOSPADM

## 2023-06-27 RX ORDER — MISOPROSTOL 200 UG/1
400 TABLET ORAL
Status: DISCONTINUED | OUTPATIENT
Start: 2023-06-27 | End: 2023-06-30 | Stop reason: HOSPADM

## 2023-06-27 RX ORDER — OXYTOCIN/0.9 % SODIUM CHLORIDE 30/500 ML
PLASTIC BAG, INJECTION (ML) INTRAVENOUS CONTINUOUS PRN
Status: DISCONTINUED | OUTPATIENT
Start: 2023-06-27 | End: 2023-06-27

## 2023-06-27 RX ORDER — CITRIC ACID/SODIUM CITRATE 334-500MG
30 SOLUTION, ORAL ORAL
Status: COMPLETED | OUTPATIENT
Start: 2023-06-27 | End: 2023-06-27

## 2023-06-27 RX ORDER — HYDROCORTISONE 25 MG/G
CREAM TOPICAL 3 TIMES DAILY PRN
Status: DISCONTINUED | OUTPATIENT
Start: 2023-06-27 | End: 2023-06-30 | Stop reason: HOSPADM

## 2023-06-27 RX ORDER — METHYLERGONOVINE MALEATE 0.2 MG/ML
200 INJECTION INTRAVENOUS
Status: DISCONTINUED | OUTPATIENT
Start: 2023-06-27 | End: 2023-06-30 | Stop reason: HOSPADM

## 2023-06-27 RX ORDER — METOCLOPRAMIDE HYDROCHLORIDE 5 MG/ML
10 INJECTION INTRAMUSCULAR; INTRAVENOUS EVERY 6 HOURS PRN
Status: DISCONTINUED | OUTPATIENT
Start: 2023-06-27 | End: 2023-06-30 | Stop reason: HOSPADM

## 2023-06-27 RX ORDER — OXYTOCIN/0.9 % SODIUM CHLORIDE 30/500 ML
100-340 PLASTIC BAG, INJECTION (ML) INTRAVENOUS CONTINUOUS PRN
Status: DISCONTINUED | OUTPATIENT
Start: 2023-06-27 | End: 2023-06-30 | Stop reason: HOSPADM

## 2023-06-27 RX ORDER — MODIFIED LANOLIN
OINTMENT (GRAM) TOPICAL
Status: DISCONTINUED | OUTPATIENT
Start: 2023-06-27 | End: 2023-06-30 | Stop reason: HOSPADM

## 2023-06-27 RX ORDER — CARBOPROST TROMETHAMINE 250 UG/ML
250 INJECTION, SOLUTION INTRAMUSCULAR
Status: DISCONTINUED | OUTPATIENT
Start: 2023-06-27 | End: 2023-06-30 | Stop reason: HOSPADM

## 2023-06-27 RX ORDER — DEXTROSE, SODIUM CHLORIDE, SODIUM LACTATE, POTASSIUM CHLORIDE, AND CALCIUM CHLORIDE 5; .6; .31; .03; .02 G/100ML; G/100ML; G/100ML; G/100ML; G/100ML
INJECTION, SOLUTION INTRAVENOUS CONTINUOUS
Status: DISCONTINUED | OUTPATIENT
Start: 2023-06-27 | End: 2023-06-30 | Stop reason: HOSPADM

## 2023-06-27 RX ORDER — BISACODYL 10 MG
10 SUPPOSITORY, RECTAL RECTAL DAILY PRN
Status: DISCONTINUED | OUTPATIENT
Start: 2023-06-29 | End: 2023-06-30 | Stop reason: HOSPADM

## 2023-06-27 RX ORDER — CEFAZOLIN SODIUM/WATER 3 G/30 ML
3 SYRINGE (ML) INTRAVENOUS
Status: DISCONTINUED | OUTPATIENT
Start: 2023-06-27 | End: 2023-06-27 | Stop reason: HOSPADM

## 2023-06-27 RX ORDER — MISOPROSTOL 200 UG/1
800 TABLET ORAL
Status: DISCONTINUED | OUTPATIENT
Start: 2023-06-27 | End: 2023-06-27 | Stop reason: HOSPADM

## 2023-06-27 RX ORDER — OXYCODONE HYDROCHLORIDE 5 MG/1
5 TABLET ORAL EVERY 4 HOURS PRN
Status: DISCONTINUED | OUTPATIENT
Start: 2023-06-27 | End: 2023-06-30 | Stop reason: HOSPADM

## 2023-06-27 RX ORDER — FENTANYL CITRATE-0.9 % NACL/PF 10 MCG/ML
100 PLASTIC BAG, INJECTION (ML) INTRAVENOUS EVERY 5 MIN PRN
Status: DISCONTINUED | OUTPATIENT
Start: 2023-06-27 | End: 2023-06-27 | Stop reason: HOSPADM

## 2023-06-27 RX ORDER — OXYTOCIN/0.9 % SODIUM CHLORIDE 30/500 ML
340 PLASTIC BAG, INJECTION (ML) INTRAVENOUS CONTINUOUS PRN
Status: DISCONTINUED | OUTPATIENT
Start: 2023-06-27 | End: 2023-06-30 | Stop reason: HOSPADM

## 2023-06-27 RX ORDER — ONDANSETRON 4 MG/1
4 TABLET, ORALLY DISINTEGRATING ORAL EVERY 6 HOURS PRN
Status: DISCONTINUED | OUTPATIENT
Start: 2023-06-27 | End: 2023-06-27

## 2023-06-27 RX ORDER — CHLOROPROCAINE HYDROCHLORIDE 20 MG/ML
INJECTION, SOLUTION EPIDURAL; INFILTRATION; INTRACAUDAL; PERINEURAL PRN
Status: DISCONTINUED | OUTPATIENT
Start: 2023-06-27 | End: 2023-06-27

## 2023-06-27 RX ORDER — NALBUPHINE HYDROCHLORIDE 20 MG/ML
2.5-5 INJECTION, SOLUTION INTRAMUSCULAR; INTRAVENOUS; SUBCUTANEOUS EVERY 6 HOURS PRN
Status: DISCONTINUED | OUTPATIENT
Start: 2023-06-27 | End: 2023-06-30 | Stop reason: HOSPADM

## 2023-06-27 RX ORDER — FENTANYL/ROPIVACAINE/NS/PF 2MCG/ML-.1
PLASTIC BAG, INJECTION (ML) EPIDURAL
Status: DISCONTINUED | OUTPATIENT
Start: 2023-06-27 | End: 2023-06-27 | Stop reason: HOSPADM

## 2023-06-27 RX ORDER — MORPHINE SULFATE 1 MG/ML
INJECTION, SOLUTION EPIDURAL; INTRATHECAL; INTRAVENOUS
Status: DISCONTINUED
Start: 2023-06-27 | End: 2023-06-27 | Stop reason: HOSPADM

## 2023-06-27 RX ORDER — PENICILLIN G POTASSIUM 5000000 [IU]/1
5 INJECTION, POWDER, FOR SOLUTION INTRAMUSCULAR; INTRAVENOUS ONCE
Status: COMPLETED | OUTPATIENT
Start: 2023-06-27 | End: 2023-06-27

## 2023-06-27 RX ORDER — NALBUPHINE HYDROCHLORIDE 20 MG/ML
2.5-5 INJECTION, SOLUTION INTRAMUSCULAR; INTRAVENOUS; SUBCUTANEOUS EVERY 6 HOURS PRN
Status: DISCONTINUED | OUTPATIENT
Start: 2023-06-27 | End: 2023-06-27

## 2023-06-27 RX ORDER — NALOXONE HYDROCHLORIDE 0.4 MG/ML
0.4 INJECTION, SOLUTION INTRAMUSCULAR; INTRAVENOUS; SUBCUTANEOUS
Status: DISCONTINUED | OUTPATIENT
Start: 2023-06-27 | End: 2023-06-30 | Stop reason: HOSPADM

## 2023-06-27 RX ORDER — SIMETHICONE 80 MG
80 TABLET,CHEWABLE ORAL 4 TIMES DAILY PRN
Status: DISCONTINUED | OUTPATIENT
Start: 2023-06-27 | End: 2023-06-30 | Stop reason: HOSPADM

## 2023-06-27 RX ORDER — ONDANSETRON 2 MG/ML
INJECTION INTRAMUSCULAR; INTRAVENOUS PRN
Status: DISCONTINUED | OUTPATIENT
Start: 2023-06-27 | End: 2023-06-27

## 2023-06-27 RX ORDER — SODIUM CHLORIDE, SODIUM LACTATE, POTASSIUM CHLORIDE, CALCIUM CHLORIDE 600; 310; 30; 20 MG/100ML; MG/100ML; MG/100ML; MG/100ML
INJECTION, SOLUTION INTRAVENOUS CONTINUOUS PRN
Status: DISCONTINUED | OUTPATIENT
Start: 2023-06-27 | End: 2023-06-27

## 2023-06-27 RX ORDER — PENICILLIN G 3000000 [IU]/50ML
3 INJECTION, SOLUTION INTRAVENOUS EVERY 4 HOURS
Status: DISCONTINUED | OUTPATIENT
Start: 2023-06-27 | End: 2023-06-27 | Stop reason: HOSPADM

## 2023-06-27 RX ORDER — SODIUM CHLORIDE, SODIUM LACTATE, POTASSIUM CHLORIDE, CALCIUM CHLORIDE 600; 310; 30; 20 MG/100ML; MG/100ML; MG/100ML; MG/100ML
INJECTION, SOLUTION INTRAVENOUS CONTINUOUS
Status: DISCONTINUED | OUTPATIENT
Start: 2023-06-27 | End: 2023-06-27 | Stop reason: HOSPADM

## 2023-06-27 RX ORDER — LIDOCAINE 40 MG/G
CREAM TOPICAL
Status: DISCONTINUED | OUTPATIENT
Start: 2023-06-27 | End: 2023-06-30 | Stop reason: HOSPADM

## 2023-06-27 RX ORDER — METOCLOPRAMIDE 10 MG/1
10 TABLET ORAL EVERY 6 HOURS PRN
Status: DISCONTINUED | OUTPATIENT
Start: 2023-06-27 | End: 2023-06-30 | Stop reason: HOSPADM

## 2023-06-27 RX ORDER — NALBUPHINE HYDROCHLORIDE 10 MG/ML
2.5-5 INJECTION, SOLUTION INTRAMUSCULAR; INTRAVENOUS; SUBCUTANEOUS EVERY 6 HOURS PRN
Status: DISCONTINUED | OUTPATIENT
Start: 2023-06-27 | End: 2023-06-27

## 2023-06-27 RX ORDER — CEFAZOLIN SODIUM/WATER 2 G/20 ML
2 SYRINGE (ML) INTRAVENOUS SEE ADMIN INSTRUCTIONS
Status: DISCONTINUED | OUTPATIENT
Start: 2023-06-27 | End: 2023-06-27 | Stop reason: HOSPADM

## 2023-06-27 RX ORDER — MISOPROSTOL 200 UG/1
400 TABLET ORAL
Status: DISCONTINUED | OUTPATIENT
Start: 2023-06-27 | End: 2023-06-27 | Stop reason: HOSPADM

## 2023-06-27 RX ORDER — PROCHLORPERAZINE MALEATE 10 MG
10 TABLET ORAL EVERY 6 HOURS PRN
Status: DISCONTINUED | OUTPATIENT
Start: 2023-06-27 | End: 2023-06-30 | Stop reason: HOSPADM

## 2023-06-27 RX ADMIN — ACETAMINOPHEN 975 MG: 325 TABLET ORAL at 19:14

## 2023-06-27 RX ADMIN — PENICILLIN G 3 MILLION UNITS: 3000000 INJECTION, SOLUTION INTRAVENOUS at 13:24

## 2023-06-27 RX ADMIN — SODIUM CHLORIDE, POTASSIUM CHLORIDE, SODIUM LACTATE AND CALCIUM CHLORIDE: 600; 310; 30; 20 INJECTION, SOLUTION INTRAVENOUS at 23:45

## 2023-06-27 RX ADMIN — LIDOCAINE HYDROCHLORIDE,EPINEPHRINE BITARTRATE 5 ML: 20; .005 INJECTION, SOLUTION EPIDURAL; INFILTRATION; INTRACAUDAL; PERINEURAL at 20:46

## 2023-06-27 RX ADMIN — ONDANSETRON 4 MG: 2 INJECTION INTRAMUSCULAR; INTRAVENOUS at 19:46

## 2023-06-27 RX ADMIN — CHLOROPROCAINE HYDROCHLORIDE 5 ML: 30 INJECTION, SOLUTION EPIDURAL; INFILTRATION; INTRACAUDAL; PERINEURAL at 19:35

## 2023-06-27 RX ADMIN — CHLOROPROCAINE HYDROCHLORIDE 5 ML: 30 INJECTION, SOLUTION EPIDURAL; INFILTRATION; INTRACAUDAL; PERINEURAL at 19:34

## 2023-06-27 RX ADMIN — Medication 600 ML/HR: at 20:02

## 2023-06-27 RX ADMIN — PENICILLIN G POTASSIUM 5 MILLION UNITS: 5000000 POWDER, FOR SOLUTION INTRAMUSCULAR; INTRAPLEURAL; INTRATHECAL; INTRAVENOUS at 09:36

## 2023-06-27 RX ADMIN — SODIUM CITRATE AND CITRIC ACID MONOHYDRATE 30 ML: 500; 334 SOLUTION ORAL at 19:15

## 2023-06-27 RX ADMIN — Medication: at 10:24

## 2023-06-27 RX ADMIN — NICOTINE 7 MG/24 HR DAILY TRANSDERMAL PATCH 1 PATCH: at 16:32

## 2023-06-27 RX ADMIN — Medication 2 G: at 19:32

## 2023-06-27 RX ADMIN — PHENYLEPHRINE HYDROCHLORIDE 100 MCG: 10 INJECTION INTRAVENOUS at 19:44

## 2023-06-27 RX ADMIN — CHLOROPROCAINE HYDROCHLORIDE 5 ML: 30 INJECTION, SOLUTION EPIDURAL; INFILTRATION; INTRACAUDAL; PERINEURAL at 19:32

## 2023-06-27 RX ADMIN — ACYCLOVIR 400 MG: 200 CAPSULE ORAL at 17:11

## 2023-06-27 RX ADMIN — MORPHINE SULFATE 1.5 MG: 0.5 INJECTION, SOLUTION EPIDURAL; INTRATHECAL; INTRAVENOUS at 20:03

## 2023-06-27 RX ADMIN — ACYCLOVIR 400 MG: 200 CAPSULE ORAL at 01:21

## 2023-06-27 RX ADMIN — LIDOCAINE HYDROCHLORIDE,EPINEPHRINE BITARTRATE 5 ML: 20; .005 INJECTION, SOLUTION EPIDURAL; INFILTRATION; INTRACAUDAL; PERINEURAL at 20:03

## 2023-06-27 RX ADMIN — SERTRALINE HYDROCHLORIDE 25 MG: 25 TABLET ORAL at 08:59

## 2023-06-27 RX ADMIN — SENNOSIDES AND DOCUSATE SODIUM 1 TABLET: 50; 8.6 TABLET ORAL at 23:46

## 2023-06-27 RX ADMIN — NICOTINE 7 MG/24 HR DAILY TRANSDERMAL PATCH 1 PATCH: at 09:00

## 2023-06-27 RX ADMIN — SODIUM CHLORIDE, POTASSIUM CHLORIDE, SODIUM LACTATE AND CALCIUM CHLORIDE 1000 ML: 600; 310; 30; 20 INJECTION, SOLUTION INTRAVENOUS at 08:58

## 2023-06-27 RX ADMIN — MISOPROSTOL 25 MCG: 100 TABLET ORAL at 01:21

## 2023-06-27 RX ADMIN — Medication 500 MG: at 19:40

## 2023-06-27 RX ADMIN — PHENYLEPHRINE HYDROCHLORIDE 0.3 MCG/KG/MIN: 10 INJECTION INTRAVENOUS at 19:46

## 2023-06-27 RX ADMIN — CHLOROPROCAINE HYDROCHLORIDE 3 ML: 30 INJECTION, SOLUTION EPIDURAL; INFILTRATION; INTRACAUDAL; PERINEURAL at 19:41

## 2023-06-27 RX ADMIN — METHYLERGONOVINE MALEATE 200 MCG: 0.2 INJECTION INTRAVENOUS at 20:05

## 2023-06-27 RX ADMIN — MISOPROSTOL 25 MCG: 100 TABLET ORAL at 05:35

## 2023-06-27 RX ADMIN — SODIUM CHLORIDE, POTASSIUM CHLORIDE, SODIUM LACTATE AND CALCIUM CHLORIDE: 600; 310; 30; 20 INJECTION, SOLUTION INTRAVENOUS at 19:30

## 2023-06-27 RX ADMIN — CHLOROPROCAINE HYDROCHLORIDE 2 ML: 30 INJECTION, SOLUTION EPIDURAL; INFILTRATION; INTRACAUDAL; PERINEURAL at 19:50

## 2023-06-27 RX ADMIN — TERBUTALINE SULFATE 0.25 MG: 1 INJECTION, SOLUTION SUBCUTANEOUS at 10:13

## 2023-06-27 RX ADMIN — ACYCLOVIR 400 MG: 200 CAPSULE ORAL at 08:58

## 2023-06-27 RX ADMIN — TRANEXAMIC ACID 1 G: 1 INJECTION, SOLUTION INTRAVENOUS at 19:37

## 2023-06-27 RX ADMIN — MISOPROSTOL 25 MCG: 100 TABLET ORAL at 03:21

## 2023-06-27 RX ADMIN — SODIUM CHLORIDE, POTASSIUM CHLORIDE, SODIUM LACTATE AND CALCIUM CHLORIDE: 600; 310; 30; 20 INJECTION, SOLUTION INTRAVENOUS at 19:40

## 2023-06-27 RX ADMIN — SODIUM CHLORIDE, POTASSIUM CHLORIDE, SODIUM LACTATE AND CALCIUM CHLORIDE: 600; 310; 30; 20 INJECTION, SOLUTION INTRAVENOUS at 20:59

## 2023-06-27 RX ADMIN — KETOROLAC TROMETHAMINE 30 MG: 30 INJECTION, SOLUTION INTRAMUSCULAR at 20:54

## 2023-06-27 RX ADMIN — DEXTROSE MONOHYDRATE 50 ML: 25 INJECTION, SOLUTION INTRAVENOUS at 10:16

## 2023-06-27 RX ADMIN — PENICILLIN G 3 MILLION UNITS: 3000000 INJECTION, SOLUTION INTRAVENOUS at 17:12

## 2023-06-27 ASSESSMENT — ACTIVITIES OF DAILY LIVING (ADL)
ADLS_ACUITY_SCORE: 18

## 2023-06-27 NOTE — PROGRESS NOTES
I was asked to see this patient by Dr. Frank.  Patient is being induced due to gestational diabetes and obesity at 37 and 6 weeks.  She received several doses of Cytotec overnight and had spontaneous rupture membranes at 6:30 in the morning.  An IUPC was placed by the nurse but a scalp electrode was unable to be placed.  The baseline fetal heart rate is in the 150s with moderate variability but the tracing becomes intermittent due to the patient's habitus.  The patient was rosina every 1 to 2 minutes about an hour ago and did have a couple of decelerations that appeared late in onset around 7 AM.  These do not appear to be going on in the last hour.  She was given a dose of terbutaline to slow the contractions down and currently by the IUPC contractions are adequate happening every 3 minutes with an amplitude of 50 mmHg.  I attempted to do a cervical check but the cervix is very posterior and high and basically fingertip dilated.  There is clear fluid coming from the cervix and the IUPC is in place.  We will need to continue attempts at external monitoring until the cervix dilates a little bit and a fetal scalp electrode will be able to be placed.  She is currently on no Cytotec or Pitocin and having these contractions by herself.

## 2023-06-27 NOTE — ANESTHESIA PREPROCEDURE EVALUATION
Anesthesia Pre-Procedure Evaluation    Patient: Teetee Alvarado   MRN: [id number] : 1985        Procedure : * No procedures listed *          Past Medical History:   Diagnosis Date     Diabetes (H)       Past Surgical History:   Procedure Laterality Date     ABSCESS DRAINAGE Left     left leg     LASIK       WISDOM TOOTH EXTRACTION        No Known Allergies   Social History     Tobacco Use     Smoking status: Every Day     Types: Vaping Device     Smokeless tobacco: Never     Tobacco comments:     vaping-daily   Substance Use Topics     Alcohol use: Not Currently      Wt Readings from Last 1 Encounters:   23 129.3 kg (285 lb)        Anesthesia Evaluation            ROS/MED HX  ENT/Pulmonary:  - neg pulmonary ROS     Neurologic:  - neg neurologic ROS     Cardiovascular:  - neg cardiovascular ROS     METS/Exercise Tolerance:     Hematologic:  - neg hematologic  ROS     Musculoskeletal:       GI/Hepatic:  - neg GI/hepatic ROS   (+) GERD,     Renal/Genitourinary:       Endo:  - neg endo ROS   (+) Obesity, gestational diabetes and Insulin,    Psychiatric/Substance Use:  - neg psychiatric ROS   (+) psychiatric history anxiety and depression     Infectious Disease:       Malignancy:       Other:   Pregnant         Physical Exam    Airway        Mallampati: II   TM distance: > 3 FB   Neck ROM: full   Mouth opening: > 3 cm    Respiratory Devices and Support         Dental  no notable dental history         Cardiovascular   cardiovascular exam normal          Pulmonary   pulmonary exam normal                OUTSIDE LABS:  CBC:   Lab Results   Component Value Date    WBC 11.8 (H) 2023    WBC 12.3 (H) 2023    HGB 12.9 2023    HGB 13.2 06/15/2023    HCT 37.7 2023    HCT 38.7 2023     2023     2023     BMP:   Lab Results   Component Value Date     06/15/2023     2022    POTASSIUM 4.3 06/15/2023    POTASSIUM 4.5 2022    CHLORIDE 105  06/15/2023    CHLORIDE 105 05/04/2022    CO2 20 (L) 06/15/2023    CO2 26 05/04/2022    BUN 7.9 06/15/2023    BUN 18 05/04/2022    CR 0.68 06/15/2023    CR 1.05 05/04/2022    GLC 97 06/27/2023     (H) 06/15/2023     COAGS: No results found for: PTT, INR, FIBR  POC:   Lab Results   Component Value Date    BGM 92 06/27/2023    HCG Positive (A) 03/13/2023     HEPATIC:   Lab Results   Component Value Date    ALBUMIN 3.9 05/19/2021    PROTTOTAL 6.8 05/19/2021    ALT 45 05/19/2021    AST 30 05/19/2021    ALKPHOS 68 05/19/2021    BILITOTAL 0.4 05/19/2021     OTHER:   Lab Results   Component Value Date    A1C 6.4 (H) 06/15/2023    LIN 9.7 06/15/2023       Anesthesia Plan    ASA Status:  3, emergent       Anesthesia Type: Epidural.              Consents    Anesthesia Plan(s) and associated risks, benefits, and realistic alternatives discussed. Questions answered and patient/representative(s) expressed understanding.    - Discussed:     - Discussed with:  Patient         Postoperative Care            Comments:    Other Comments: Plan for 1.5mg duramorph for pain control, 24 hour pulse ox ordered        neg OB ROS.       Kyler Cowart MD

## 2023-06-27 NOTE — PROVIDER NOTIFICATION
Dr. Frank notified that pt SROM clear fluid at 0633. MD notified that FHR has recurrent decelerations.     Per MD to administer fluid bolus, reposition, put in internal monitors, and perform SVE.    Veronica Rollnis RN

## 2023-06-27 NOTE — PROGRESS NOTES
Progress Note    I placed FSE.   Care per Dr. Frank.     Lizzy Nascimento MD, FACOG  (p) [phone]

## 2023-06-27 NOTE — ANESTHESIA PROCEDURE NOTES
"Epidural catheter Procedure Note    Pre-Procedure   Staff -        Anesthesiologist:  Kyler Cowart MD       Performed By: anesthesiologist       Location: OB       Procedure Start/Stop Times: 6/27/2023 10:19 AM and 6/27/2023 10:35 AM       Pre-Anesthestic Checklist: patient identified, IV checked, risks and benefits discussed, informed consent, monitors and equipment checked, pre-op evaluation, at physician/surgeon's request and post-op pain management  Timeout:       Correct Patient: Yes        Correct Procedure: Yes        Correct Site: Yes        Correct Position: Yes   Procedure Documentation  Procedure: epidural catheter       Patient Position: sitting       Skin prep: Chloraprep       Local skin infiltrated with 1.5 mL of 1% lidocaine.        Insertion Site: L2-3. (midline approach).       Technique: LORT saline        JOSE LUIS at 4 cm.       Needle Type: Naurexy needle       Needle Gauge: 18.        Needle Length (Inches): 3.5        Catheter: 20 G.          Catheter threaded easily.             # of attempts: 1 and  # of redirects:  0    Assessment/Narrative         Paresthesias: No.       Test dose of 3 mL lidocaine 1.5% w/ 1:200,000 epinephrine at.         Test dose negative, 3 minutes after injection, for signs of intravascular, subdural, or intrathecal injection.       Insertion/Infusion Method: LORT saline       Aspiration negative for Heme or CSF via Epidural Catheter.    Medication(s) Administered   0.125% Bupivacaine + 2 mcg/mL Fentanyl via CADD (Epidural) - EPIDURAL   10 mL - 6/27/2023 10:19:00 AM  Medication Administration Time: 6/27/2023 10:19 AM      FOR [facility]) ONLY:   Pain Team Contact information: please page the Pain Team Via [employer]. Search \"Pain\". During daytime hours, please page the attending first. At night please page the resident first.      "

## 2023-06-27 NOTE — PLAN OF CARE
"  Problem: Labor  Goal: Effective Progression to Delivery  6/27/2023 1506 by Maribell Woodward, RN  Outcome: Progressing  6/27/2023 0802 by Maribell Woodward, RN  Outcome: Progressing.  Monitoring EFM has been a challenge do to body habitus.  No currently using any pitocin or misoprostol.       Goal Outcome Evaluation:  Adequate MVUs.  /58   Temp 99.8  F (37.7  C) (Axillary)   Resp 16   Ht 1.626 m (5' 4\")   Wt 129.3 kg (285 lb)   LMP  (LMP Unknown)   SpO2 97%   BMI 48.92 kg/m         Plan of Care Reviewed With: patient    Overall Patient Progress: improvingOverall Patient Progress: improving.  Will place an SE when cervix allows.  Limiting vaginal exams as temp is 99.8 ax.  Progress updated to Junior Pitts MD.           "

## 2023-06-27 NOTE — PROGRESS NOTES
Labor Progress Note    Assessment/Plan  38 year old  at 37w6d gestational age admitted for induction of labor due to GDM A2.  Feeling more uncomfortable this morning with contractions. SROM at 6:30AM. Patient is requesting epidural during labor.  Seeing a few variables and lates overnight.  Still very difficult to obtain fetal heart tracing so attempt was made to place internal monitors. IUPC was able to be placed this morning but unable to place FSE due to cervix at very high station and posterior.  -Start GBS prophylaxis with penicillin due to SROM and internal monitors  -Request help from [facility] to place FSE  -Labor epidural  -Continue with Cytotec for cervical ripening  -IUPC placed  - Anticipate     Subjective  Patient is definitely feeling more uncomfortable with contractions. Requesting epidural during labor. Was able to take a bath this morning with some mild relief.       Objective  Vital signs in last 24 hours  Temp:  [98.1  F (36.7  C)-98.8  F (37.1  C)] 98.3  F (36.8  C)  Resp:  [18] 18  BP: (117-141)/(58-83) 141/65  SpO2:  [99 %] 99 %      Physical Exam  General: uncomfortable appearing.   Abd: Gravid, soft, nontender  Cervix: 1cm, 30% effaced, -3 station  FHR: Baseline 155/moderate variability/+ accels/variable and late decels; Category 2 tracing  [place]: Contractions Q 2 min  Extremities: mild peripheral edema    Pertinent Labs   Lab Results   Component Value Date    ABORH A POS 2023    HGB 12.9 2023        Patient discussed with attending physician, Dr. Rosa Frank , who agrees with the plan.     Hong Pacheco MD PGY2 2023  [facility] Family Medicine Residency Program

## 2023-06-27 NOTE — PROVIDER NOTIFICATION
Notified  of difficulty tracing FHR overnight, Cytotec dose #7 was given at 0530. MD notified of FHR decelerations and variables.     Veronica Rollins RN

## 2023-06-27 NOTE — PLAN OF CARE
Problem: Labor  Goal: Stable Fetal Wellbeing  Outcome: Progressing   Goal Outcome Evaluation:  Daisha monitor is not working per previous shifts.  EFM is spotty due to larger body habitus. Very difficult to monitor.      Plan of Care Reviewed With: patient.  Misoprostol is currently out of pt system.  Monitors off to bathe and order breakfast.    Overall Patient Progress: improvingOverall Patient Progress: improving.  Pt desires epidural.  Resident is aware and is communicating with Dr. Frank.  Plan is to eat, give insulin, start antibiotics and get epidural for pain.  Hopefully enough change has been made to place internals for good monitoring.

## 2023-06-27 NOTE — CONSULTS
Progress Note     Doing well. Pain controlled.     Temp:  [97.8  F (36.6  C)-100.1  F (37.8  C)] 100.1  F (37.8  C)  Resp:  [16-18] 16  BP: (110-154)/(56-78) 136/64  SpO2:  [96 %-100 %] 98 %    Cervix: 2/90/-3/cephalic    FSE placed: repetitive late decels after FSE placed. Resolved and now fetal tachycardia to the 170s-180s  IUPC: Q2-3 minutes. > 200 MVUs.     A/P: 37 yo  at 37w 6d undergoing induction   -- Has been difficult to monitor throughout the days. FSE placed. NRFHTs remote from delivery. Discussed observation vs. C/S. Discussed risks and benefits with the patient of both.   -- Will proceed with C/S for NRFHTs remote from delivery.   -- Questions answered  -- Consent signed    Lizzy Nascimento MD, FACOG  P) [phone]        Lizzy Nascimento MD

## 2023-06-27 NOTE — PROGRESS NOTES
Midnight labs of 60 were incorrect.  Midnight is the automatic time default.  Please disregard midnight glucose of 60 by this writer at midnight.  Pt has her own Dexcon she has been using to track her bloodsugars.  Nursing has been adding her numbers and double checking with hospital meter if out of range.  Lab called and them are unable to delete this inaccurate result.

## 2023-06-28 LAB
GLUCOSE BLD-MCNC: 112 MG/DL (ref 70–125)
HGB BLD-MCNC: 10.9 G/DL (ref 11.7–15.7)

## 2023-06-28 PROCEDURE — 250N000013 HC RX MED GY IP 250 OP 250 PS 637

## 2023-06-28 PROCEDURE — 82947 ASSAY GLUCOSE BLOOD QUANT: CPT | Performed by: FAMILY MEDICINE

## 2023-06-28 PROCEDURE — 85018 HEMOGLOBIN: CPT | Performed by: OBSTETRICS & GYNECOLOGY

## 2023-06-28 PROCEDURE — 250N000013 HC RX MED GY IP 250 OP 250 PS 637: Performed by: OBSTETRICS & GYNECOLOGY

## 2023-06-28 PROCEDURE — 250N000011 HC RX IP 250 OP 636: Performed by: OBSTETRICS & GYNECOLOGY

## 2023-06-28 PROCEDURE — 250N000013 HC RX MED GY IP 250 OP 250 PS 637: Performed by: FAMILY MEDICINE

## 2023-06-28 PROCEDURE — 88307 TISSUE EXAM BY PATHOLOGIST: CPT | Mod: 26 | Performed by: PATHOLOGY

## 2023-06-28 PROCEDURE — 36415 COLL VENOUS BLD VENIPUNCTURE: CPT | Performed by: OBSTETRICS & GYNECOLOGY

## 2023-06-28 PROCEDURE — 120N000001 HC R&B MED SURG/OB

## 2023-06-28 PROCEDURE — 88307 TISSUE EXAM BY PATHOLOGIST: CPT | Mod: TC | Performed by: OBSTETRICS & GYNECOLOGY

## 2023-06-28 PROCEDURE — 999N000249 HC STATISTIC C-SECTION ON UNIT

## 2023-06-28 PROCEDURE — 36415 COLL VENOUS BLD VENIPUNCTURE: CPT | Performed by: FAMILY MEDICINE

## 2023-06-28 RX ADMIN — ACETAMINOPHEN 975 MG: 325 TABLET ORAL at 22:30

## 2023-06-28 RX ADMIN — SERTRALINE HYDROCHLORIDE 25 MG: 25 TABLET ORAL at 09:35

## 2023-06-28 RX ADMIN — NICOTINE 7 MG/24 HR DAILY TRANSDERMAL PATCH 1 PATCH: at 22:46

## 2023-06-28 RX ADMIN — ACETAMINOPHEN 975 MG: 325 TABLET ORAL at 09:34

## 2023-06-28 RX ADMIN — ACETAMINOPHEN 975 MG: 325 TABLET ORAL at 16:06

## 2023-06-28 RX ADMIN — NICOTINE 7 MG/24 HR DAILY TRANSDERMAL PATCH 1 PATCH: at 09:42

## 2023-06-28 RX ADMIN — SENNOSIDES AND DOCUSATE SODIUM 2 TABLET: 50; 8.6 TABLET ORAL at 09:35

## 2023-06-28 RX ADMIN — KETOROLAC TROMETHAMINE 30 MG: 30 INJECTION, SOLUTION INTRAMUSCULAR; INTRAVENOUS at 04:07

## 2023-06-28 RX ADMIN — IBUPROFEN 800 MG: 800 TABLET, FILM COATED ORAL at 23:57

## 2023-06-28 RX ADMIN — IBUPROFEN 800 MG: 800 TABLET, FILM COATED ORAL at 17:52

## 2023-06-28 RX ADMIN — ACETAMINOPHEN 975 MG: 325 TABLET ORAL at 01:33

## 2023-06-28 RX ADMIN — IBUPROFEN 800 MG: 800 TABLET, FILM COATED ORAL at 11:45

## 2023-06-28 ASSESSMENT — ACTIVITIES OF DAILY LIVING (ADL)
ADLS_ACUITY_SCORE: 18
ADLS_ACUITY_SCORE: 19
ADLS_ACUITY_SCORE: 18
ADLS_ACUITY_SCORE: 18
ADLS_ACUITY_SCORE: 19
ADLS_ACUITY_SCORE: 19
ADLS_ACUITY_SCORE: 18

## 2023-06-28 NOTE — ANESTHESIA CARE TRANSFER NOTE
Patient: Teetee Alvarado    Procedure: Procedure(s):   SECTION       Diagnosis: * No pre-op diagnosis entered *  Diagnosis Additional Information: No value filed.    Anesthesia Type:   Epidural     Note:    Oropharynx: oropharynx clear of all foreign objects  Level of Consciousness: awake  Oxygen Supplementation: room air    Independent Airway: airway patency satisfactory and stable  Dentition: dentition unchanged  Vital Signs Stable: post-procedure vital signs reviewed and stable  Report to RN Given: handoff report given  Patient transferred to: Labor and Delivery    Handoff Report: Identifed the Patient, Identified the Reponsible Provider, Reviewed the pertinent medical history, Discussed the surgical course, Reviewed Intra-OP anesthesia mangement and issues during anesthesia, Set expectations for post-procedure period and Allowed opportunity for questions and acknowledgement of understanding      Vitals:  Vitals Value Taken Time   /56 23   Temp 37  C (98.6  F) 23   Pulse 86 23   Resp 16 23   SpO2 97 % 23       Electronically Signed By: KAITLYN Butler CRNA  2023  9:17 PM

## 2023-06-28 NOTE — PROGRESS NOTES
Called to OR for C section. Baby was brought to warmer dried and stimulated. Required CPAP 5 for about 12 minutes, transitioned off CPAP. RN remains at bedside for further cares. Will follow as needed.

## 2023-06-28 NOTE — LACTATION NOTE
"This note was copied from a baby's chart.  Rounded on family for lactation support per provider and nursing request.  Esteban is the \"surprise\" first born for Teetee and her partner. Teetee reported a maternal history of PCOS, twice yearly periods and many years of unprotected sex with her partner so the positive pregnancy test with Esteban at 23 weeks was surprise to her. Teetee appeared happy and expressed a readiness to raise her baby with her partner.  Teetee reported breast changes, tenderness and leakage at 23 weeks which was the indication to her to do a pregnancy test.  Due to her maternal history, age and baby's supplementation, Teetee has been started on the use of the Symphony breast pump.  She does not yet have a pump for home use.   provided a QR code for the medela and spectra breast pumps available here on the unit.      MIKEY missed Esteban's feeding.  Teetee was using the breast pump upon entering with 24mm flanges with a maintain setting.  Adequate nipple movement without pain was present.  MIKEY educated Teetee on the use of the initiation setting for pump use for now.     Educated/reviewed milk production of supply and demand.  Encouraged mom to breastfeed on demand with a goal of 8-12 feedings per day to help milk production.    Reviewed use of education folder for self learning, lactation and community support, indicators to call MD and maternal/family well being.    Provided education and a resource/teaching sheet with QR codes for video support/education for:  Hand expressing and storing breastmilk  Achieving a Deep Asymmetrical Latch  Breastfeeding Positions  How to Choose a breast pump flange size   Side Lying paced bottle feeding    Questions encouraged and addressed.    Rita Merchant RNC, IBCLC      "

## 2023-06-28 NOTE — OP NOTE
Section Operative Report    Preoperative Diagnosis:   NRFHTs remote from delivery  Morbid obesity - BMI 48  A2 GDM  AMA  GBS positive  Anxiety / Depression  ADHD    Postoperative Diagnosis:  NRFHTs remote from delivery  Morbid obesity - BMI 48  A2 GDM  AMA  GBS positive  Anxiety / Depression  ADHD    Procedure: Primary Low transverse  section with two layer closure  Uterine Extensions: None  Surgeon:  Lizzy Nascimento MD   Assistant: Dr. Frank  Anesthesia: epidural  Delivery QBL (mL): 1286 cc  Uterine Atony: Present. Methergine given.     Findings:   1. Amniotic fluid - Clear  2. Cephalic presentation  3. Normal uterus, tubes and ovaries bilaterally  4. Live male infant  5. Apgars of 8 at one minute, 8 at 5 minutes  6. Weight - 7 Ibs 8 oz  7. Normal appearing placenta  8. Good hemostasis  9. Counts correct x 2    Drains: Barnhart catheter.  Pathology: Placenta  Complications: None    Procedure:    Patient was met preoperatively where we discussed the procedure and the risks associated with the procedure.  She understood these to include but not limited to injury to adjacent organs including bowel, bladder, ureter, infection and bleeding. Understanding these risks her consents were signed.      She was brought to the operating room in stable condition.  After bolusing the epidural fetal heart tones were checked and were stable. A barnhart catheter was previously placed. She was carefully prepped and draped in the typical sterile fashion for the procedure.  A timeout was then performed.      A Pfannenstiel skin incision was made and carried down to the rectus fascia which was incised in the midline and carried out bilaterally.  The superior and inferior aspects of the rectus fascia were elevated up and the underlying rectus muscles dissected off with sharp and blunt techniques.  The rectus muscles were then  at the midline and the peritoneum was identified and entered bluntly.  This incision  was extended. An Allexis retractor was placed.  A bladder blade was introduced.  A low transverse uterine incision was made revealing clear amniotic fluid.  The baby's head was then delivered. The remainder of the infant easily delivered. There was a spontaneous cry and therefore bulb suction was performed. The baby had reduced tone so the cord was clamped x 2 and cut and the infant handed off to waiting nursing personnel.    The placenta was then manually removed from the uterus.  The uterus was left in the abdomen.  The uterine incision was then closed with #1-chromic from both angles in a running locking fashion. The uterus was atonic and Methergine was given. A second imbricating layer was placed with #1 chromic. The incision was irrigated and noted to be hemostatic. The pericolic gutters were cleared of all clots and debris.  The uterine incision was again inspected and noted to be hemostatic.  The peritoneum was now closed with 3.0 vicryl suture superiorly to inferiorly.  The rectus muscles were made hemostatic with the use of electrocautery and brought together with vertical mattress sutures of 2.0 chromic. The fascia was brought together with 0-PDS from both angles in a running nonlocking fashion and met at the midline. The subcutaneous tissues were irrigated, made hemostatic with use of electrocautery and brought together with 3-0 plain gut suture.  Skin was closed with 4.0 monocryl .  Patient tolerated this procedure well.  Needle, instrument and lap counts were correct x two.    Dr. Frank's assistance was requested 2/2 morbid obesity. She was present throughout the case.     Lizzy Nascimento MD     CC: Dr. Frank

## 2023-06-28 NOTE — CARE PLAN
VSS. Fundus midline and firm. Munroe in place with adequate urine output. Abdominal incision closed with glue. Pt has not ambulated. Pt wants to breastfeed but baby having difficulty latching. Pt placed on lactation board. Mom bonding well with baby.

## 2023-06-28 NOTE — PROCEDURES
DELIVERY - 1st ASSIST NOTE (Family Medicine)  [facility]  Location: [facility]    Date of Service: 2023  Service Provider: Rosa Frank DO Family Medicine    PATIENT INFO     Patient Name: Teetee Alvarado      MRN:  [id number]     :  1985       PCP: Rosa Frank [facility]         Teetee Alvarado is now a  at 37w6d by Estimated Date of Delivery: 2023 by 22 week US.      INDICATION FOR  DELIVERY: fetal distress.  I managed the patient's labor prior to decision to convert to  delivery; see separate admission and labor progress notes.    My role was vital to the surgeon who requested my assistance for visualization and retraction, as well as fundal pressure at the time of delivery. My presence was also necessary for the mental health and comfort of the patient who followed me through her pregnancy.      ANESTHESIA: spinal     OPERATIVE DESCRIPTION: Patient was taken to the OR and primary low transverse  section performed in the usual fashion and without complications.  Please see OB/GYN surgical note for full details.  My assistance with and presence at the  section was medically indicated/necessary for both maternal and fetal well being as well as at the request of Dr. Nascimento.  My duties included assisting with visualization, extraction of infant, and other duties as assigned by the primary surgeon.     Patient tolerated procedure well and went to back to room in good and stable condition.    EBL: 1200 mL    FINDINGS: viable male infant in right OA position, CPAP resuscitation needed x 12 minutes, BW -see chart info.     SPECIMENS: cord gases and placenta sent for pathology     Rosa Frank DO, 2023 9:09 PM

## 2023-06-28 NOTE — CONSULTS
"ACUPUNCTURIST TREATMENT NOTE    Name: Teetee Alvarado  :  1985  MRN:  [id number]    Acupuncture Treatment  Patient Type: Maternity  Intervention Reason: Lactation  Patient complaint:: insufficient lactation  Initial insertions: Ventura 17, St 36, Sp 6, Sp 8, GB 21         \"Risks and benefits of acupuncture were discussed with patient. Consent for treatment was given. We thank you for the referral.\"     Lisa Benavides L.Ac.     Date:  2023  Time:  10:54 AM    "

## 2023-06-28 NOTE — PLAN OF CARE
Goal Outcome Evaluation:      Plan of Care Reviewed With: patient, spouse  Pt bonding with baby and vitals are stable. Her fundus is midline and firm without massage and bleeding is scant. She is up and moving on her own and she was able to void. Her pain is well managed with tylenol and ibuprofen. She is working on breastfeeding baby and pumping while supplementing donor milk.            Problem: Postpartum ( Delivery)  Goal: Optimal Pain Control and Function  Outcome: Progressing  Intervention: Prevent or Manage Pain  Recent Flowsheet Documentation  Taken 2023 9806 by Kyra Cabrera, RN  Perineal Care: perineum cleansed  Taken 2023 0930 by Kyra Cabrera, RN  Perineal Care:    absorbent brief/pad changed    perineum cleansed     Problem: Postpartum ( Delivery)  Goal: Effective Urinary Elimination  Outcome: Progressing     Problem: Postpartum ( Delivery)  Goal: Absence of Infection Signs and Symptoms  Outcome: Progressing

## 2023-06-29 PROCEDURE — 250N000013 HC RX MED GY IP 250 OP 250 PS 637: Performed by: FAMILY MEDICINE

## 2023-06-29 PROCEDURE — 250N000013 HC RX MED GY IP 250 OP 250 PS 637: Performed by: OBSTETRICS & GYNECOLOGY

## 2023-06-29 PROCEDURE — 120N000001 HC R&B MED SURG/OB

## 2023-06-29 PROCEDURE — 250N000013 HC RX MED GY IP 250 OP 250 PS 637

## 2023-06-29 RX ADMIN — NICOTINE 7 MG/24 HR DAILY TRANSDERMAL PATCH 1 PATCH: at 22:42

## 2023-06-29 RX ADMIN — SERTRALINE HYDROCHLORIDE 25 MG: 25 TABLET ORAL at 09:44

## 2023-06-29 RX ADMIN — IBUPROFEN 800 MG: 800 TABLET, FILM COATED ORAL at 13:03

## 2023-06-29 RX ADMIN — SENNOSIDES AND DOCUSATE SODIUM 2 TABLET: 50; 8.6 TABLET ORAL at 21:53

## 2023-06-29 RX ADMIN — ACETAMINOPHEN 975 MG: 325 TABLET ORAL at 19:01

## 2023-06-29 RX ADMIN — SENNOSIDES AND DOCUSATE SODIUM 1 TABLET: 50; 8.6 TABLET ORAL at 09:44

## 2023-06-29 RX ADMIN — IBUPROFEN 800 MG: 800 TABLET, FILM COATED ORAL at 19:00

## 2023-06-29 RX ADMIN — ACETAMINOPHEN 975 MG: 325 TABLET ORAL at 05:13

## 2023-06-29 RX ADMIN — IBUPROFEN 800 MG: 800 TABLET, FILM COATED ORAL at 06:00

## 2023-06-29 RX ADMIN — ACETAMINOPHEN 975 MG: 325 TABLET ORAL at 13:03

## 2023-06-29 ASSESSMENT — ACTIVITIES OF DAILY LIVING (ADL)
ADLS_ACUITY_SCORE: 18

## 2023-06-29 NOTE — PROGRESS NOTES
"    Csection - Post operative day 2    ASSESSMENT: PLAN:   POD#2    Primary csection-  fetal intolerance to labor  Morbid obesity - BMI 48  A2 GDM  AMA  GBS positive  Anxiety / Depression  ADHD      Continue routine cares       SUBJECTIVE:    The patient feels well: Catheter is out, bleeding decreased, tolerating normal diet, and passing flatus.  Pain is well controlled. The patient has no emotional concerns.  The baby is well and being fed    OBJECTIVE:  /75 (BP Location: Right arm)   Pulse 90   Temp 97.9  F (36.6  C) (Oral)   Resp 18   Ht 1.626 m (5' 4\")   Wt 129.3 kg (285 lb)   LMP  (LMP Unknown)   SpO2 97%   Breastfeeding Yes   BMI 48.92 kg/m      Fundus firm  Incision- dressing dry   Ext- nontender      Lab  Hemoglobin   Date Value Ref Range Status   06/28/2023 10.9 (L) 11.7 - 15.7 g/dL Final   ]        Kavya Galarza MD  [facility]  [phone]    "

## 2023-06-29 NOTE — CONSULTS
"ACUPUNCTURIST TREATMENT NOTE    Name: Teetee Alvarado  :  1985  MRN:  [id number]    Acupuncture Treatment  Patient Type: Maternity  Intervention Reason: Lactation  Patient complaint:: swelling in lower legs and feet, insufficient lactation  Initial insertions: Sp 9, St 36, TW 5, Ear: Hill Men, SI 1, Ventura 17  Number of needles inserted: 11  Number of needles removed: 11         \"Risks and benefits of acupuncture were discussed with patient. Consent for treatment was given. We thank you for the referral.\"     Lisa Benavides L.Ac.     Date:  2023  Time:  11:50 AM    "

## 2023-06-29 NOTE — CONSULTS
Integrative Therapy Consult    Healing PresenceYes  Essential Oils: Topical (EO/Topical Oil)     Lavender Massage Oil - HC, Velma -  HC, Tea Tree - HC       Healing Music:       Breathwork:       Guided Imagery:       Acupressure:       Oshibori:       Energy Therapy:       Healing Touch:       Reiki:       Qi Gong:     Massage: Foot, Targeted massage      Targeted Massage: Legs  Sleep Promotion:       Other Therapy:       Intervention Reason: Edema     Pre and Post Session Scores: Patient Desires Treatment: yes                             Delivery:         Referrals:      Eula Rodriguez

## 2023-06-29 NOTE — PLAN OF CARE
Goal Outcome Evaluation:      Problem: Postpartum ( Delivery)  Goal: Optimal Pain Control and Function  Outcome: Progressing    Problem: Postpartum ( Delivery)  Goal: Absence of Infection Signs and Symptoms  Outcome: Progressing      Vitals stable. Pain managed with scheduled meds and cold pack. Incision open to air and without drainage. Patient reported nicotine patch fell off in shower and then took a walk. Nicotine patch replaced. Patient appears to be bonding well with .

## 2023-06-29 NOTE — PLAN OF CARE
Goal Outcome Evaluation:      Plan of Care Reviewed With: patient, spouse      Pt bonding with baby and vitals are stable. Her fundus is midline and firm without massage and bleeding is scant. Her pain is well managed with Tylenol and ibuprofen. She is pumping and feeding baby as well as supplementing donor milk.        Problem: Postpartum ( Delivery)  Goal: Optimal Pain Control and Function  2023 by Kyra Cabrear, RN  Outcome: Progressing  2023 by Kyra Cabrera, RN  Outcome: Progressing  Intervention: Prevent or Manage Pain  Recent Flowsheet Documentation  Taken 2023 174 by Kyra Cabrera, RN  Perineal Care:   perineum cleansed   absorbent brief/pad changed     Problem: Breastfeeding  Goal: Effective Breastfeeding  2023 by Kyra Cabrera, RN  Outcome: Progressing  2023 by Kyra Cabrera, RN  Outcome: Progressing

## 2023-06-29 NOTE — ANESTHESIA POSTPROCEDURE EVALUATION
Patient: Teetee Alvarado    Procedure: Procedure(s):   SECTION       Anesthesia Type:  Epidural    Note:     Postop Pain Control: Uneventful            Sign Out: Well controlled pain   PONV: No   Neuro/Psych: Uneventful            Sign Out: Acceptable/Baseline neuro status   Airway/Respiratory: Uneventful            Sign Out: Acceptable/Baseline resp. status   CV/Hemodynamics: Uneventful            Sign Out: Acceptable CV status; No obvious hypovolemia; No obvious fluid overload   Other NRE: NONE   DID A NON-ROUTINE EVENT OCCUR? No           Last vitals:  Vitals:    23 1407 23 1747 23 0010   BP: 131/65 121/60 133/63   Pulse: 71 73 77   Resp: 18 16 18   Temp: 36.4  C (97.6  F) 36.7  C (98.1  F) 36.5  C (97.7  F)   SpO2: 97% 97% 97%       Electronically Signed By: Kathi Cardona MD  2023  3:17 AM

## 2023-06-29 NOTE — LACTATION NOTE
This note was copied from a baby's chart.  [facility] She reported having PCOS, gestational diabetes, hx  of low thyroid, and BMI over 30. Because of these risk factors, a conversation was reviewed with Teetee about pumping with a Symphony pump after feeding.     Breast massage and expression were reviewed with colostrum present on both breasts. Teetee does have denser tissue around nipples and nipples are flatter.With a gloved finger, a suck assessment was done. It was noted that Vinayak has a whaletail tongue, a higher palate, and a shorter frenum.    A feeding was attemtped at the breast with and without a NS. Esteban was not able to hold the nipple in his mouth, but could grasp the NS for a short period of time. No transfer of milk was noted in the shield. Teetee then went on to pump with the Symphony pump and colostrum was noted in both flanges.     To offer Esteban the pumped colostrum and PDM as needed per feeding.    Resources were reviewed for after  discharge including ECFE and outpt lactation.    To continue to follow while inpt as needed.

## 2023-06-30 VITALS
RESPIRATION RATE: 17 BRPM | OXYGEN SATURATION: 97 % | HEART RATE: 84 BPM | DIASTOLIC BLOOD PRESSURE: 73 MMHG | HEIGHT: 64 IN | TEMPERATURE: 98.7 F | WEIGHT: 285 LBS | BODY MASS INDEX: 48.65 KG/M2 | SYSTOLIC BLOOD PRESSURE: 138 MMHG

## 2023-06-30 PROBLEM — Z98.891 S/P C-SECTION: Status: ACTIVE | Noted: 2023-06-30

## 2023-06-30 PROCEDURE — 250N000013 HC RX MED GY IP 250 OP 250 PS 637: Performed by: OBSTETRICS & GYNECOLOGY

## 2023-06-30 RX ORDER — OXYCODONE HYDROCHLORIDE 5 MG/1
5 TABLET ORAL EVERY 6 HOURS PRN
Qty: 12 TABLET | Refills: 0 | Status: SHIPPED | OUTPATIENT
Start: 2023-06-30 | End: 2023-07-03

## 2023-06-30 RX ORDER — IBUPROFEN 600 MG/1
600 TABLET, FILM COATED ORAL EVERY 6 HOURS PRN
Qty: 40 TABLET | Refills: 0 | Status: SHIPPED | OUTPATIENT
Start: 2023-06-30 | End: 2024-04-16

## 2023-06-30 RX ADMIN — ACETAMINOPHEN 975 MG: 325 TABLET ORAL at 01:56

## 2023-06-30 RX ADMIN — ACETAMINOPHEN 975 MG: 325 TABLET ORAL at 07:44

## 2023-06-30 RX ADMIN — IBUPROFEN 800 MG: 800 TABLET, FILM COATED ORAL at 01:56

## 2023-06-30 RX ADMIN — IBUPROFEN 800 MG: 800 TABLET, FILM COATED ORAL at 07:44

## 2023-06-30 ASSESSMENT — ACTIVITIES OF DAILY LIVING (ADL)
ADLS_ACUITY_SCORE: 18

## 2023-06-30 NOTE — PLAN OF CARE
Problem: Plan of Care - These are the overarching goals to be used throughout the patient stay.    Goal: Plan of Care Review  Description: The Plan of Care Review/Shift note should be completed every shift.  The Outcome Evaluation is a brief statement about your assessment that the patient is improving, declining, or no change.  This information will be displayed automatically on your shift note.  Outcome: Progressing  Flowsheets (Taken 2023 05)  Plan of Care Reviewed With: patient     Problem: Postpartum ( Delivery)  Goal: Optimal Pain Control and Function  Outcome: Progressing    S/P c/section Recovery:   Patient  had an uneventful shift, fundus is firm, without massage, midline and lochia is scant. She is voiding spontaneously without problems, passing flatus, and had bowel movement yesterday. Low transverse incision, transparent dressing is clean, dry and intact, no redness or drainage noted. Patient is independent, ambulating in the halls. Pain is control with the medication given.  Goal Outcome Evaluation:

## 2023-06-30 NOTE — PLAN OF CARE
Goal Outcome Evaluation:                 Outcome Evaluation: VSS, eager to discharge home. Educated on s/s of preeclampsia, denies symptoms at this time. Incision clean, dry and intact. Provided abdominal binder.

## 2023-06-30 NOTE — LACTATION NOTE
This note was copied from a baby's chart.  Rounded on mom and baby for lactation follow up.  Teetee and Joe are ready for discharge.  Teetee has been using her Symphony pump with 5ml EBM and she is pleased.  Teetee expresses her current feeding plan with Esteban is to pump and bottle while working on latch and feeding at the breast.  LC reviewed resources available to Teetee.      Esteban will be going home on home phototherapy.  LC educated assembly of the home phototherapy light and reviewed goal of feedings to optimize urine and stool output for bilirubin removal.    Provided resources for local food shelf for PHDM as Teetee and Joe feel the cost of PHDM is cost prohibitive.  Information on flash pasteurization provided as well as parents discussed milk sharing through Facebook.  LC encouraged open conversation if they choose that route as medications, supplements and lifestyle choices can influence breastmilk.  LC provided affirmation that formula is a safe nutrition choice as well.      Questions encouraged and addressed.    Rita Merchant RNC, IBCLC

## 2023-06-30 NOTE — DISCHARGE INSTRUCTIONS
Warning Signs after Having a Baby    Keep this paper on your fridge or somewhere else where you can see it.    Call your provider if you have any of these symptoms up to 12 weeks after having your baby.    Thoughts of hurting yourself or your baby  Pain in your chest or trouble breathing  Severe headache not helped by pain medicine  Eyesight concerns (blurry vision, seeing spots or flashes of light, other changes to eyesight)  Fainting, shaking or other signs of a seizure    Call 9-1-1 if you feel that it is an emergency.     The symptoms below can happen to anyone after giving birth. They can be very serious. Call your provider if you have any of these warning signs.    My provider s phone number: _______________________    Losing too much blood (hemorrhage)    Call your provider if you soak through a pad in less than an hour or pass blood clots bigger than a golf ball. These may be signs that you are bleeding too much.    Blood clots in the legs or lungs    After you give birth, your body naturally clots its blood to help prevent blood loss. Sometimes this increased clotting can happen in other areas of the body, like the legs or lungs. This can block your blood flow and be very dangerous.     Call your provider if you:  Have a red, swollen spot on the back of your leg that is warm or painful when you touch it.   Are coughing up blood.     Infection    Call your provider if you have any of these symptoms:  Fever of 100.4 F (38 C) or higher.  Pain or redness around your stitches if you had an incision.   Any yellow, white, or green fluid coming from places where you had stitches or surgery.    Mood Problems (postpartum depression)    Many people feel sad or have mood changes after having a baby. But for some people, these mood swings are worse.     Call your provider right away if you feel so anxious or nervous that you can't care for yourself or your baby.    Preeclampsia (high blood pressure)    Even if you  didn't have high blood pressure when you were pregnant, you are at risk for the high blood pressure disease called preeclampsia. This risk can last up to 12 weeks after giving birth.     Call your provider if you have:   Pain on your right side under your rib cage  Sudden swelling in the hands and face    Remember: You know your body. If something doesn't feel right, get medical help.     For informational purposes only. Not to replace the advice of your health care provider. Copyright 2020 [facility]. All rights reserved. Clinically reviewed by Emily Marr, RNC-OB, MSN. [employer] [id number] - Rev 02/23.

## 2023-07-02 ENCOUNTER — PATIENT OUTREACH (OUTPATIENT)
Dept: CARE COORDINATION | Facility: CLINIC | Age: 38
End: 2023-07-02
Payer: COMMERCIAL

## 2023-07-02 NOTE — PROGRESS NOTES
[facility] Care Resource [facility] Contact  [facility]/Voicemail     Clinical Data: Transitional Care Management Outreach     Outreach attempted x 2.  Left message on patient's voicemail, providing [facility]'s 24/7 scheduling and nurse triage phone number [phone] ([phone]) for questions/concerns and/or to schedule an appt with an [facility] provider, if they do not have a PCP.      Plan:  [facility] will do no further outreaches at this time.       Svetlana Casanova RN  Connected Care Resource [place], [facility]    *Connected Care Resource Team does NOT follow patient ongoing. Referrals are identified based on internal discharge reports and the outreach is to ensure patient has an understanding of their discharge instructions.

## 2023-07-03 ENCOUNTER — TELEPHONE (OUTPATIENT)
Dept: FAMILY MEDICINE | Facility: CLINIC | Age: 38
End: 2023-07-03
Payer: COMMERCIAL

## 2023-07-03 NOTE — TELEPHONE ENCOUNTER
Received today from Teetee FERNANDEZ paper work    Please Call Teetee for  when finished  [phone]    Need paper work no later than 7-7-2023    Paper work given to Sage

## 2023-07-06 LAB
PATH REPORT.COMMENTS IMP SPEC: NORMAL
PATH REPORT.COMMENTS IMP SPEC: NORMAL
PATH REPORT.FINAL DX SPEC: NORMAL
PATH REPORT.GROSS SPEC: NORMAL
PATH REPORT.MICROSCOPIC SPEC OTHER STN: NORMAL
PATH REPORT.RELEVANT HX SPEC: NORMAL
PHOTO IMAGE: NORMAL

## 2023-07-10 NOTE — DISCHARGE SUMMARY
HOSPITAL DISCHARGE SUMMARY -  Birth    Patient Name: Teetee Alvarado   YOB: 1985  Age: 38 year old  Medical Record Number: [id number]  Primary Physician: Rosa Frank    Admission Date:  2023  Delivery Date:  2023  Gestational Age at Delivery:  37w 6d   Discharge Date:  2023    REASON FOR ADMISSION: Labor and Delivery    DIAGNOSIS:    1.  Birth secondary to NRFHTs remote from delivery  2. APGARS at 1 min 8, at 5 min 8  3. Baby's Weight 7 lbs 8 oz    Conditions complicating antepartum/postpartum:  Antepartum - Morbid obesity, A2 GDM, AMA, GBS positive, Anxiety/Depression, ADHD  Intrapartum - NRFHTs remote from delivery  Postpartum - Asymptomatic acute blood loss anemia    PROCEDURES:  Lower transverse     SIGNIFICANT DIAGNOSTIC PROCEDURES:   None    CONSULTS: None    HISTORY OF PRESENT ILLNESS AND HOSPITAL COURSE: This is a 38 year old   female who underwent  section without complication secondary to NRFTHs remote from delivery. Postoperative course was notable for asymptomatic acute blood loss anemia. On the day of discharge patient was tolerating diet, pain was controlled with oral medications, she was voiding and passing gas.    LABS:  Hemoglobin   Date Value Ref Range Status   2023 10.9 (L) 11.7 - 15.7 g/dL Final   2023 12.4 11.7 - 15.7 g/dL Final     PENDING LABS:  None    DISPOSITION:  Home    DISCHARGE CONDITION: Good/Stable    DISCHARGE MEDICATIONS:      Review of your medicines      UNREVIEWED medicines. Ask your doctor about these medicines      Dose / Directions   oxyCODONE 5 MG tablet  Commonly known as: ROXICODONE  Used for: S/P   Ask about: Should I take this medication?      Dose: 5 mg  Take 1 tablet (5 mg) by mouth every 6 hours as needed for pain  Quantity: 12 tablet  Refills: 0        START taking      Dose / Directions   ibuprofen 600 MG tablet  Commonly known as: ADVIL/MOTRIN  Used for: S/P       Dose:  600 mg  Take 1 tablet (600 mg) by mouth every 6 hours as needed  Quantity: 40 tablet  Refills: 0        CONTINUE these medicines which have NOT CHANGED      Dose / Directions   hydrOXYzine 50 MG tablet  Commonly known as: ATARAX  Used for: Anxiety and depression      Dose: 50 mg  Take 1 tablet (50 mg) by mouth 3 times daily as needed for anxiety  Quantity: 90 tablet  Refills: 1     prenatal multivitamin w/iron 27-0.8 MG tablet      Dose: 1 tablet  Take 1 tablet by mouth daily  Refills: 0     sertraline 25 MG tablet  Commonly known as: ZOLOFT  Used for: Anxiety and depression      Dose: 25 mg  Take 1 tablet (25 mg) by mouth daily  Quantity: 90 tablet  Refills: 1        STOP taking    acyclovir 400 MG tablet  Commonly known as: ZOVIRAX        aspirin 81 MG EC tablet        FreeStyle Robert 3 Sensor Misc        insulin aspart 100 UNIT/ML pen  Commonly known as: NovoLOG PEN        insulin detemir 100 UNIT/ML pen  Commonly known as: LEVEMIR PEN        insulin pen needle 31G X 8 MM miscellaneous  Commonly known as: 31G X 8 MM        Microlet Lancets Misc              Where to get your medicines      These medications were sent to [id number] - [address] AT [facility] & [address] [address]    Phone: [phone]     ibuprofen 600 MG tablet    oxyCODONE 5 MG tablet       DISCHARGE PLAN:   - Follow up with myself, in 1 week  - Take medication as prescribed  - Physical activity: As tolerated, no heavy lifting. Pelvic rest.  - Diet:  Regular  - Medication:  Please see MAR  - Warning signs discussed with patient about when to call the clinic/hospital  - All questions and concerns were answered for the patient prior to discharge.       Lizzy Nascimento MD, FACOG  (P) [phone]

## 2023-08-06 ENCOUNTER — HEALTH MAINTENANCE LETTER (OUTPATIENT)
Age: 38
End: 2023-08-06

## 2023-08-30 ENCOUNTER — PRENATAL OFFICE VISIT (OUTPATIENT)
Dept: FAMILY MEDICINE | Facility: CLINIC | Age: 38
End: 2023-08-30
Payer: COMMERCIAL

## 2023-08-30 ENCOUNTER — MEDICAL CORRESPONDENCE (OUTPATIENT)
Dept: HEALTH INFORMATION MANAGEMENT | Facility: CLINIC | Age: 38
End: 2023-08-30

## 2023-08-30 VITALS
WEIGHT: 270 LBS | OXYGEN SATURATION: 99 % | BODY MASS INDEX: 46.1 KG/M2 | HEIGHT: 64 IN | DIASTOLIC BLOOD PRESSURE: 75 MMHG | HEART RATE: 72 BPM | SYSTOLIC BLOOD PRESSURE: 118 MMHG

## 2023-08-30 DIAGNOSIS — D62 ACUTE BLOOD LOSS AS CAUSE OF POSTOPERATIVE ANEMIA: ICD-10-CM

## 2023-08-30 DIAGNOSIS — F33.0 MILD EPISODE OF RECURRENT MAJOR DEPRESSIVE DISORDER (H): ICD-10-CM

## 2023-08-30 DIAGNOSIS — Z86.32 HISTORY OF INSULIN CONTROLLED GESTATIONAL DIABETES MELLITUS (GDM): ICD-10-CM

## 2023-08-30 DIAGNOSIS — Z12.4 SCREENING FOR MALIGNANT NEOPLASM OF CERVIX: ICD-10-CM

## 2023-08-30 LAB
HBA1C MFR BLD: 5.9 % (ref 0–5.6)
HGB BLD-MCNC: 12.8 G/DL (ref 11.7–15.7)

## 2023-08-30 PROCEDURE — 36415 COLL VENOUS BLD VENIPUNCTURE: CPT | Performed by: FAMILY MEDICINE

## 2023-08-30 PROCEDURE — 85018 HEMOGLOBIN: CPT | Performed by: FAMILY MEDICINE

## 2023-08-30 PROCEDURE — 83036 HEMOGLOBIN GLYCOSYLATED A1C: CPT | Performed by: FAMILY MEDICINE

## 2023-08-30 PROCEDURE — G0145 SCR C/V CYTO,THINLAYER,RESCR: HCPCS | Performed by: FAMILY MEDICINE

## 2023-08-30 PROCEDURE — 87624 HPV HI-RISK TYP POOLED RSLT: CPT | Performed by: FAMILY MEDICINE

## 2023-08-30 RX ORDER — CALCIUM CARBONATE 300MG(750)
1 TABLET,CHEWABLE ORAL DAILY
Qty: 90 TABLET | Refills: 1 | Status: SHIPPED | OUTPATIENT
Start: 2023-08-30 | End: 2024-04-16

## 2023-08-30 ASSESSMENT — EDINBURGH POSTNATAL DEPRESSION SCALE (EPDS)
THINGS HAVE BEEN GETTING ON TOP OF ME: NO, I HAVE BEEN COPING AS WELL AS EVER
TOTAL SCORE: 2
I HAVE BEEN ABLE TO LAUGH AND SEE THE FUNNY SIDE OF THINGS: AS MUCH AS I ALWAYS COULD
I HAVE LOOKED FORWARD WITH ENJOYMENT TO THINGS: AS MUCH AS I EVER DID
I HAVE BEEN ANXIOUS OR WORRIED FOR NO GOOD REASON: HARDLY EVER
I HAVE BEEN SO UNHAPPY THAT I HAVE BEEN CRYING: NO, NEVER
I HAVE FELT SCARED OR PANICKY FOR NO GOOD REASON: NO, NOT AT ALL
I HAVE BEEN SO UNHAPPY THAT I HAVE HAD DIFFICULTY SLEEPING: NOT AT ALL
THE THOUGHT OF HARMING MYSELF HAS OCCURRED TO ME: NEVER
I HAVE BLAMED MYSELF UNNECESSARILY WHEN THINGS WENT WRONG: NOT VERY OFTEN
I HAVE FELT SAD OR MISERABLE: NO, NOT AT ALL

## 2023-08-30 ASSESSMENT — PATIENT HEALTH QUESTIONNAIRE - PHQ9
SUM OF ALL RESPONSES TO PHQ QUESTIONS 1-9: 7
10. IF YOU CHECKED OFF ANY PROBLEMS, HOW DIFFICULT HAVE THESE PROBLEMS MADE IT FOR YOU TO DO YOUR WORK, TAKE CARE OF THINGS AT HOME, OR GET ALONG WITH OTHER PEOPLE: SOMEWHAT DIFFICULT
SUM OF ALL RESPONSES TO PHQ QUESTIONS 1-9: 7

## 2023-08-30 NOTE — PROGRESS NOTES
"Teetee Alvarado is a 38 year old  female presenting for routine postpartum follow up.    Date of delivery was 2023 via  section.  Complications noted during the pregnancy/delivery include AMA, morbid obesity, GDM A2, HSV.    Healing well, pain has subsided  Mood: denies concerns with postpartum blues/depression.  Not taking sertraline or hydroxyzine.  Energy: good, no significant fatigue   Has resumed sexual activity.  Boyfriend planning on getting vasectomy for contraception.  Short period returned, no worrisome bleeding  Breast/bottle feeding  Recently ran out of prenatal vitamin, so not currently taking    OB History    Para Term  AB Living   1 1 1 0 0 1   SAB IAB Ectopic Multiple Live Births   0 0 0 0 1      # Outcome Date GA Lbr Jose F/2nd Weight Sex Delivery Anes PTL Lv   1 Term 23 37w6d  3.402 kg (7 lb 8 oz) M CS-LTranv  N RADHA      Name: EDGARDOMALE-TEETEE      Apgar1: 8  Apgar5: 8       /75   Pulse 72   Ht 1.626 m (5' 4\")   Wt 122.5 kg (270 lb)   LMP  (LMP Unknown)   SpO2 99%   Breastfeeding Yes   BMI 46.35 kg/m     General: no apparent distress, appears well  Cardiovascular: regular rate and rhythm without murmur  Pulmonary: clear to auscultation bilaterally without wheezing or crackles  Abdomen: Soft, non-tender.  No rebound or guarding. Low transverse scar well healed.  GYN: uterus is normal non-gravid size and nontender, well healed vaginal wall and external genitalia .  Cervix normal appearing.  Lower extremity: no significant swelling    Assessment/Plan  1. Routine postpartum follow-up  Overall doing well and adjusting to new baby. Post partum depression has not been a concern. Breastfeeding and supplementing with formula. Recommended continuing prenatal vitamin during breastfeeding, refill sent. Desires vasectomy for contraception-her boyfriend will call to schedule. Ok to resume normal activities without restriction.  - Prenatal MV-Min-FA-Omega-3 (PRENATAL " GUMMIES/DHA & FA) 0.4-32.5 MG CHEW; Take 1 tablet by mouth daily  Dispense: 90 tablet; Refill: 1    2. History of insulin controlled gestational diabetes mellitus (GDM)  History of prediabetes and GDM A2 during pregnancy.  Update A1c today.  Below pregravid BMI (which was 47.18), current BMI 46.35.  - Hemoglobin A1c; Future    3. Acute blood loss as cause of postoperative anemia  S/p low transverse , hemoglobin 10.9 at discharge.  Update hemoglobin today.  - Hemoglobin; Future    4. Mild episode of recurrent major depressive disorder (H)  Reports doing well off sertraline.  Has hydroxyzine seen as needed as needed which she has not needed to take.  We will follow-up if there are mood concerns.    5. Screening for malignant neoplasm of cervix  - Pap screen with HPV - recommended age 30 - 65 years      Rosa Frank DO    Answers submitted by the patient for this visit:  Patient Health Questionnaire (Submitted on 2023)  If you checked off any problems, how difficult have these problems made it for you to do your work, take care of things at home, or get along with other people?: Somewhat difficult  PHQ9 TOTAL SCORE: 7

## 2023-08-31 ENCOUNTER — TELEPHONE (OUTPATIENT)
Dept: FAMILY MEDICINE | Facility: CLINIC | Age: 38
End: 2023-08-31
Payer: COMMERCIAL

## 2023-08-31 NOTE — TELEPHONE ENCOUNTER
Notification from [place] for PA on     Disp Refills Start End HENNA    Prenatal MV-Min-FA-Omega-3 (PRENATAL GUMMIES/DHA & FA) 0.4-32.5 MG CHEW 90 tablet 1 8/30/2023  No   Sig - Route: Take 1 tablet by mouth daily - Oral   Sent to pharmacy as: Prenatal Gummies/DHA & FA 0.4-32.5 MG Oral Tablet Chewable       Cover My Meds  Key [id number]

## 2023-09-05 LAB
BKR LAB AP GYN ADEQUACY: NORMAL
BKR LAB AP GYN INTERPRETATION: NORMAL
BKR LAB AP HPV REFLEX: NORMAL
BKR LAB AP PREVIOUS ABNORMAL: NORMAL
PATH REPORT.COMMENTS IMP SPEC: NORMAL
PATH REPORT.COMMENTS IMP SPEC: NORMAL
PATH REPORT.RELEVANT HX SPEC: NORMAL

## 2023-09-05 NOTE — TELEPHONE ENCOUNTER
PA Initiation    Medication: PRENATAL GUMMIES/DHA & FA 0.4-32.5 MG PO CHEW  Insurance Company: [employer] - Phone [phone] Fax [phone]  Pharmacy Filling the Rx: [id number] [place] [address] [address]  AT [facility] [address]  Filling Pharmacy Phone: [phone]  Filling Pharmacy Fax:    Start Date: 9/5/2023  Central Prior Authorization Team   Phone: [phone]

## 2023-09-06 NOTE — TELEPHONE ENCOUNTER
PRIOR AUTHORIZATION DENIED    Medication: PRENATAL GUMMIES/DHA & FA 0.4-32.5 MG PO CHEW  Insurance Company: [employer] - Phone [phone] Fax [phone]  Denial Date: 9/5/2023  Denial Rational:     Appeal Information: None available due to plan exclusion.  Patient Notified: Yes

## 2023-09-07 LAB
HUMAN PAPILLOMA VIRUS 16 DNA: NEGATIVE
HUMAN PAPILLOMA VIRUS 18 DNA: NEGATIVE
HUMAN PAPILLOMA VIRUS FINAL DIAGNOSIS: NORMAL
HUMAN PAPILLOMA VIRUS OTHER HR: NEGATIVE

## 2023-10-30 ENCOUNTER — MEDICAL CORRESPONDENCE (OUTPATIENT)
Dept: HEALTH INFORMATION MANAGEMENT | Facility: CLINIC | Age: 38
End: 2023-10-30
Payer: COMMERCIAL

## 2023-12-24 ENCOUNTER — HEALTH MAINTENANCE LETTER (OUTPATIENT)
Age: 38
End: 2023-12-24

## 2024-01-02 ENCOUNTER — OFFICE VISIT (OUTPATIENT)
Dept: FAMILY MEDICINE | Facility: CLINIC | Age: 39
End: 2024-01-02
Payer: COMMERCIAL

## 2024-01-02 VITALS
HEART RATE: 111 BPM | RESPIRATION RATE: 20 BRPM | TEMPERATURE: 97.8 F | DIASTOLIC BLOOD PRESSURE: 90 MMHG | WEIGHT: 293 LBS | HEIGHT: 64 IN | BODY MASS INDEX: 50.02 KG/M2 | OXYGEN SATURATION: 96 % | SYSTOLIC BLOOD PRESSURE: 130 MMHG

## 2024-01-02 DIAGNOSIS — E66.01 MORBID OBESITY (H): ICD-10-CM

## 2024-01-02 DIAGNOSIS — H00.011 HORDEOLUM EXTERNUM OF RIGHT UPPER EYELID: Primary | ICD-10-CM

## 2024-01-02 PROBLEM — O24.414 INSULIN CONTROLLED GESTATIONAL DIABETES MELLITUS (GDM) IN THIRD TRIMESTER: Status: RESOLVED | Noted: 2023-04-20 | Resolved: 2024-01-02

## 2024-01-02 PROBLEM — O09.522 MULTIGRAVIDA OF ADVANCED MATERNAL AGE IN SECOND TRIMESTER: Status: RESOLVED | Noted: 2023-03-20 | Resolved: 2024-01-02

## 2024-01-02 PROBLEM — F33.0 MILD EPISODE OF RECURRENT MAJOR DEPRESSIVE DISORDER (H): Status: RESOLVED | Noted: 2023-08-30 | Resolved: 2024-01-02

## 2024-01-02 PROBLEM — O09.513 HIGH-RISK PREGNANCY, PRIMIGRAVIDA OF ADVANCED MATERNAL AGE IN THIRD TRIMESTER: Status: RESOLVED | Noted: 2023-04-20 | Resolved: 2024-01-02

## 2024-01-02 PROCEDURE — 99213 OFFICE O/P EST LOW 20 MIN: CPT | Performed by: PHYSICIAN ASSISTANT

## 2024-01-02 RX ORDER — ERYTHROMYCIN 5 MG/G
OINTMENT OPHTHALMIC
COMMUNITY
Start: 2023-11-17 | End: 2024-04-16

## 2024-01-02 RX ORDER — SULFAMETHOXAZOLE/TRIMETHOPRIM 800-160 MG
1 TABLET ORAL 2 TIMES DAILY
Qty: 14 TABLET | Refills: 0 | Status: SHIPPED | OUTPATIENT
Start: 2024-01-02 | End: 2024-01-09

## 2024-01-02 ASSESSMENT — PATIENT HEALTH QUESTIONNAIRE - PHQ9
SUM OF ALL RESPONSES TO PHQ QUESTIONS 1-9: 3
SUM OF ALL RESPONSES TO PHQ QUESTIONS 1-9: 3
10. IF YOU CHECKED OFF ANY PROBLEMS, HOW DIFFICULT HAVE THESE PROBLEMS MADE IT FOR YOU TO DO YOUR WORK, TAKE CARE OF THINGS AT HOME, OR GET ALONG WITH OTHER PEOPLE: SOMEWHAT DIFFICULT

## 2024-01-02 ASSESSMENT — PAIN SCALES - GENERAL: PAINLEVEL: MILD PAIN (3)

## 2024-01-02 ASSESSMENT — ENCOUNTER SYMPTOMS: EYE PAIN: 1

## 2024-01-02 NOTE — COMMUNITY RESOURCES LIST (ENGLISH)
01/02/2024   [facility]  N/A  For questions about this resource list or additional care needs, please contact your primary care clinic or care manager.  Phone: [phone]   Email: N/A   Address: [address] [place] [address]   Hours: N/A        Financial Stability       Utility payment assistance  1  [facility] Distance: 3.85 miles      In-Person   [address] [place] [address]  Language: English  Hours: Mon - Fri 8:00 AM - 3:00 PM  Fees: Free   Phone: (925) 998-9872 Ext.14 Email: [email or web address] Website: http://www.[email or web address].[employer]     2  [facility] - Utility payment assistance Distance: 4.19 miles      In-Person, Phone/Virtual   [address] [address] [place] [address]  Language: English  Hours: Mon - Thu 9:00 AM - 3:00 PM  Fees: Free   Phone: (668) 924-3846 Email: [email or web address]@[facility].org Website: http://www.[facility].org/care-ministries/          Food and Nutrition       Food pantry  3  [facility] Distance: 0.65 miles      In-Person, Pickup   [address] [place] [address]  Language: English  Hours: Tue 10:30 AM - 3:00 PM  Fees: Free   Phone: (413) 459-1668 Email: office@[facility].[employer] Website: http://www.[facility].[employer]/foodministries.html     4  [facility] Food Market Distance: 2.02 miles      Pickup   [address] [place] [address]  Language: Turkish, English, South Korean, Upper sorbian, Swahili  Hours: Mon - Wed 9:00 AM - 11:30 AM , Tue 1:00 PM - 3:30 PM , Wed 1:00 PM - 4:00 PM  Fees: Free   Phone: (315) 486-6736 Email: info@[facility].org Website: https://[facility]mn.org/programs/food-support/food-markets/     SNAP application assistance  5  [facility] - Office of Multicultural Services Distance: 3.3 miles      Phone/Virtual   [address] [facility] [place] [address]  Language: American Sign Language, Nepali, Belarusian, English, Urdu, Argentine, Oromo, Scottish, Belizean, Ghanaian, Swahili, German, Italian  Hours: Mon - Tue 9:00 AM - 4:00 PM , Wed 10:00 AM - 5:00 PM , Thu - Fri 9:00 AM - 4:00 PM  Fees: Free   Phone: (416) 815-4095 Email: oms@[place]. Website: http://www.[facility]/residents/human-services/multi-cultural-services     6  [employer] Distance: 3.94 miles      In-Person   [address] [place] [address]  Language: English, Ghanaian  Hours: Mon - Fri 8:30 AM - 5:00 PM  Fees: Free   Phone: (302) 254-3852 Email: info@[employer].org Website: http://www.[employer].org/     Soup kitchen or free meals  7  [facility] - [employer] Distance: 2.48 miles      [facility]   [address] [place], [place] [id number]  Language: English  Hours: Wed 5:00 PM - 6:00 PM  Fees: Free   Phone: (569) 944-4982 Email: office@[employer] Website: https://[employer].org/     8  [facility] - [employer] [employer] Distance: 2.89 miles      In-Person   [address] [place] [address]  Language: English  Hours: Sun 5:00 PM - 6:30 PM  Fees: Free   Phone: (627) 768-3851 Email: dllc.office@[employer].org Website: http://www.[employer].org/          Important Numbers & Websites       Emergency Services   911  City Services   311  Poison Control   (259) 842-8441  Suicide Prevention Lifeline   (590) 313-7200 (TALK)  Child Abuse Hotline   (640) 259-4274 (4-A-Child)  Sexual Assault Hotline   (429) 451-4543 (HOPE)  National Runaway Safeline   (946) 980-1594 (RUNAWAY)  All-Options Talkline   (755) 970-7809  Substance Abuse Referral   (660) 905-1017 (HELP)

## 2024-01-02 NOTE — PROGRESS NOTES
"  Assessment & Plan  continue erythromycin and warm soaks, delaye dprn oral ABXs  Problem List Items Addressed This Visit          Digestive    Morbid obesity (H)     Other Visit Diagnoses       Hordeolum externum of right upper eyelid    -  Primary    Relevant Medications    sulfamethoxazole-trimethoprim (BACTRIM DS) 800-160 MG tablet               16 minutes spent by me on the date of the encounter doing chart review, history and exam, documentation and further activities per the note               BMI:   Estimated body mass index is 50.36 kg/m  as calculated from the following:    Height as of this encounter: 1.626 m (5' 4\").    Weight as of this encounter: 133.1 kg (293 lb 6.4 oz).   Which affects her hx gestational DM    BEST Walter  [facility] is a 38 year old, presenting for the following health issues:  Eye Problem        1/2/2024     1:09 PM   Additional Questions   Roomed by Stone       History of Present Illness       Reason for visit:  Inflamed puffy eyelid  Symptom onset:  1-3 days ago  Symptom intensity:  Severe  Symptom progression:  Staying the same  Had these symptoms before:  No    She eats 2-3 servings of fruits and vegetables daily.She consumes 2 sweetened beverage(s) daily.She exercises with enough effort to increase her heart rate 9 or less minutes per day.  She exercises with enough effort to increase her heart rate 3 or less days per week.   She is taking medications regularly.         Had lower lid sty about a month ag, this one is upper, had been using erythromycin ointment since yesteday    Vaped just PTA- BPs usually very good.      Review of Systems   Eyes:  Positive for pain.           Objective    BP (!) 140/110 (BP Location: Right arm, Patient Position: Sitting, Cuff Size: Adult Large)   Pulse 111   Temp 97.8  F (36.6  C) (Oral)   Resp 20   Ht 1.626 m (5' 4\")   Wt 133.1 kg (293 lb 6.4 oz)   SpO2 96%   BMI 50.36 " kg/m    Body mass index is 50.36 kg/m .  Physical Exam   Eyes: Conjunctiva clear,no exudates EOMI, PERRLA, no FB,  medial  rt upper lid margin mildly red, no pustule

## 2024-01-09 ENCOUNTER — MEDICAL CORRESPONDENCE (OUTPATIENT)
Dept: HEALTH INFORMATION MANAGEMENT | Facility: CLINIC | Age: 39
End: 2024-01-09
Payer: COMMERCIAL

## 2024-04-16 ENCOUNTER — VIRTUAL VISIT (OUTPATIENT)
Dept: FAMILY MEDICINE | Facility: CLINIC | Age: 39
End: 2024-04-16
Payer: COMMERCIAL

## 2024-04-16 DIAGNOSIS — F33.2 SEVERE EPISODE OF RECURRENT MAJOR DEPRESSIVE DISORDER, WITHOUT PSYCHOTIC FEATURES (H): Primary | ICD-10-CM

## 2024-04-16 DIAGNOSIS — Z30.014 ENCOUNTER FOR INITIAL PRESCRIPTION OF INTRAUTERINE CONTRACEPTIVE DEVICE (IUD): ICD-10-CM

## 2024-04-16 DIAGNOSIS — Z86.32 HISTORY OF INSULIN CONTROLLED GESTATIONAL DIABETES MELLITUS (GDM): ICD-10-CM

## 2024-04-16 PROCEDURE — 96127 BRIEF EMOTIONAL/BEHAV ASSMT: CPT | Mod: 95 | Performed by: FAMILY MEDICINE

## 2024-04-16 PROCEDURE — G2211 COMPLEX E/M VISIT ADD ON: HCPCS | Mod: 95 | Performed by: FAMILY MEDICINE

## 2024-04-16 PROCEDURE — 99214 OFFICE O/P EST MOD 30 MIN: CPT | Mod: 95 | Performed by: FAMILY MEDICINE

## 2024-04-16 RX ORDER — BUPROPION HYDROCHLORIDE 150 MG/1
150 TABLET ORAL EVERY MORNING
Qty: 30 TABLET | Refills: 0 | Status: SHIPPED | OUTPATIENT
Start: 2024-04-16 | End: 2024-05-08

## 2024-04-16 ASSESSMENT — ANXIETY QUESTIONNAIRES
4. TROUBLE RELAXING: MORE THAN HALF THE DAYS
3. WORRYING TOO MUCH ABOUT DIFFERENT THINGS: MORE THAN HALF THE DAYS
2. NOT BEING ABLE TO STOP OR CONTROL WORRYING: MORE THAN HALF THE DAYS
GAD7 TOTAL SCORE: 11
5. BEING SO RESTLESS THAT IT IS HARD TO SIT STILL: NOT AT ALL
1. FEELING NERVOUS, ANXIOUS, OR ON EDGE: MORE THAN HALF THE DAYS
IF YOU CHECKED OFF ANY PROBLEMS ON THIS QUESTIONNAIRE, HOW DIFFICULT HAVE THESE PROBLEMS MADE IT FOR YOU TO DO YOUR WORK, TAKE CARE OF THINGS AT HOME, OR GET ALONG WITH OTHER PEOPLE: VERY DIFFICULT
7. FEELING AFRAID AS IF SOMETHING AWFUL MIGHT HAPPEN: SEVERAL DAYS
GAD7 TOTAL SCORE: 11
6. BECOMING EASILY ANNOYED OR IRRITABLE: MORE THAN HALF THE DAYS

## 2024-04-16 ASSESSMENT — PATIENT HEALTH QUESTIONNAIRE - PHQ9: SUM OF ALL RESPONSES TO PHQ QUESTIONS 1-9: 16

## 2024-04-16 NOTE — PROGRESS NOTES
Teetee is a 39 year old who is being evaluated via a billable video visit.    How would you like to obtain your AVS? MyChart  If the video visit is dropped, the invitation should be resent by: Text to cell phone: [phone]  Will anyone else be joining your video visit? No      Assessment & Plan     Severe episode of recurrent major depressive disorder, without psychotic features (H)  Increased depression and anxiety symptoms since the birth of her son in June 2023.  Denies SI/HI but is interested in pharmacologic treatment.  Given elevated BMI and history of ADHD, will trial Wellbutrin 150 mg daily with follow-up in 3 weeks for dose adjustment.  If there is increased anxiety/agitation symptoms will discontinue and change to SSRI.  Previously had been on sertraline 100 mg daily prior to conceiving.  She defers psychotherapy referral for now.  - buPROPion (WELLBUTRIN XL) 150 MG 24 hr tablet; Take 1 tablet (150 mg) by mouth every morning    History of insulin controlled gestational diabetes mellitus (GDM)  History of GDM A2 in prior pregnancy, and had been prediabetic prior to conceiving.  Recommend follow-up A1c and diet/lifestyle modifications to prevent progression to diabetes.  - Hemoglobin A1c; Future    Encounter for initial prescription of intrauterine contraceptive device (IUD)  History of PCOS with infrequent periods, but desires contraception for pregnancy prevention.  To prevent compliance concerns, she is interested in Mirena IUD.  Anticipate difficult placement due to patient's body habitus so gynecology referral placed.  - Ob/Gyn  Referral; Future    The longitudinal plan of care for the diagnosis(es)/condition(s) as documented were addressed during this visit. Due to the added complexity in care, I will continue to support Teetee in the subsequent management and with ongoing continuity of care.    Subjective   Teetee is a 39 year old, presenting for the following health issues:  Depression  (Postpartum depression ) and Contraception (Discuss birth control)        2024     2:04 PM   Additional Questions   Roomed by MARIFER Buck CMA   Accompanied by -   TEXT LINK TO CELL  Video Start Time: 2:13 PM    Contraception       Son born 2023.  She found out about this with the pregnancy when she was 6 months pregnant.  History of PSC OS with infrequent periods and thought she was infertile.  Had been living independently in her apartment for multiple years prior to this.  Life-changing when her son was born, now in a committed relationship with the child's father and living in a home together.  Cat recently  would been with her for 10+ years.  She always wanted to be able mom and is happy with life circumstances, so does not understand why her mood is not better.  Denies SI/HI.  Admits to mom guilt, and has difficulty with outside influences/advice particularly her mother.  Essentially quit her job after having a baby so much of life is changed.  Had been on sertraline in the past along with Wellbutrin for smoking cessation.  Has hydroxyzine on hand as needed panic attacks but has not needed to use this.    Interested in contraception.  Does not want OCPs due to concern for forgetting to take it.  Had been on Depo in the past.      Objective           Vitals:  No vitals were obtained today due to virtual visit.    Physical Exam   GENERAL: alert and no distress  EYES: Eyes grossly normal to inspection.  No discharge or erythema, or obvious scleral/conjunctival abnormalities.  RESP: No audible wheeze, cough, or visible cyanosis.    SKIN: Visible skin clear. No significant rash, abnormal pigmentation or lesions.  NEURO: Cranial nerves grossly intact.  Mentation and speech appropriate for age.  PSYCH: mentation appears normal, affect normal/bright, tearful, judgement and insight intact, and appearance well groomed          Video-Visit Details    Type of service:  Video Visit   Video End Time:  200  Originating Location (pt. Location): Home    Distant Location (provider location):  On-site  Platform used for Video Visit: Doximhieu  Signed Electronically by: Rosa Frank DO

## 2024-04-18 ENCOUNTER — OFFICE VISIT (OUTPATIENT)
Dept: MIDWIFE SERVICES | Facility: CLINIC | Age: 39
End: 2024-04-18
Attending: FAMILY MEDICINE
Payer: COMMERCIAL

## 2024-04-18 VITALS
HEART RATE: 86 BPM | WEIGHT: 285 LBS | SYSTOLIC BLOOD PRESSURE: 128 MMHG | DIASTOLIC BLOOD PRESSURE: 77 MMHG | BODY MASS INDEX: 48.92 KG/M2 | OXYGEN SATURATION: 97 %

## 2024-04-18 DIAGNOSIS — Z30.430 ENCOUNTER FOR INSERTION OF INTRAUTERINE CONTRACEPTIVE DEVICE: Primary | ICD-10-CM

## 2024-04-18 DIAGNOSIS — Z01.812 PRE-PROCEDURE LAB EXAM: ICD-10-CM

## 2024-04-18 DIAGNOSIS — Z30.014 ENCOUNTER FOR INITIAL PRESCRIPTION OF INTRAUTERINE CONTRACEPTIVE DEVICE (IUD): ICD-10-CM

## 2024-04-18 LAB — HCG UR QL: NEGATIVE

## 2024-04-18 PROCEDURE — 58300 INSERT INTRAUTERINE DEVICE: CPT | Performed by: ADVANCED PRACTICE MIDWIFE

## 2024-04-18 PROCEDURE — 81025 URINE PREGNANCY TEST: CPT | Performed by: ADVANCED PRACTICE MIDWIFE

## 2024-04-18 NOTE — PROGRESS NOTES
IUD Insertion:  CONSULT:    Is a pregnancy test required: Yes.  Was it positive or negative?  Negative  Was a consent obtained?  Yes    Subjective: Teetee Alvarado is a 39 year old  presents for IUD and desires Mirena type IUD.    Patient has been given the opportunity to ask questions about all forms of birth control, including all options appropriate for Teetee Alvarado. Discussed that no method of birth control, except abstinence is 100% effective against pregnancy or sexually transmitted infection.     Teetee Alvarado understands she may have the IUD removed at any time. IUD should be removed by a health care provider.    The entire insertion procedure was reviewed with the patient, including care after placement.    Patient's last menstrual period was 2023. Has current contraception. No allergy to betadine or shellfish. Patient declines STD screening  hCG Urine Qualitative   Date Value Ref Range Status   2024 Negative Negative Final     Comment:     This test is for screening purposes.  Results should be interpreted along with the clinical picture.  Confirmation testing is available if warranted by ordering [id number], HCG Quantitative Pregnancy.         /77 (BP Location: Right arm, Patient Position: Sitting, Cuff Size: Adult Large)   Pulse 86   Wt 129.3 kg (285 lb)   LMP 2023   SpO2 97%   BMI 48.92 kg/m      Pelvic Exam:   EG/BUS: normal genital architecture without lesions, erythema or abnormal secretions.   Vagina: moist, pink, rugae with physiologic discharge and secretions  Cervix: parous no lesions and pink, moist, closed, without lesion or CMT  Uterus: retroverted position, mobile, no pain  Adnexa: within normal limits and no masses, nodularity, tenderness    PROCEDURE NOTE: -- IUD Insertion    Reason for Insertion: contraception    Premedicated with ibuprofen.  Under sterile technique, cervix was visualized with speculum and prepped with Betadine solution swab x 3. Tenaculum was  placed for stability. The uterus was gently straightened and sounded to 8.5 cm. IUD prepared for placement, and IUD inserted according to 's instructions without difficulty or significant resitance, and deployed at the fundus. The strings were visualized and trimmed to 3.0 cm from the external os. Tenaculum was removed and hemostasis noted. Speculum removed.  Patient tolerated procedure well.    Lot # [id number]  Exp: Jan 2026    EBL: minimal    Complications: none    ASSESSMENT:     ICD-10-CM    1. Encounter for insertion of intrauterine contraceptive device  Z30.430 levonorgestrel (MIRENA) 52 MG (20 mcg/day) IUD 1 each     INSERTION INTRAUTERINE DEVICE      2. Encounter for initial prescription of intrauterine contraceptive device (IUD)  Z30.014 Ob/Gyn  Referral      3. Pre-procedure lab exam  Z01.812 HCG Qual, Urine ([id number])           PLAN:    Given 's handouts, including when to have IUD removed, list of danger s/sx, side effects and follow up recommended. Encouraged condom use for prevention of STD. Back up contraception advised for 7 days if progestin method. Advised to call for any fever, for prolonged or severe pain or bleeding, abnormal vaginal discharge, or unable to palpate strings. She was advised to use pain medications (ibuprofen) as needed for mild to moderate pain. Advised to follow-up in clinic in 4-6 weeks for IUD string check if unable to find strings or as directed by provider.     KAITLYN Rodriguez CNM

## 2024-04-27 ENCOUNTER — E-VISIT (OUTPATIENT)
Dept: FAMILY MEDICINE | Facility: CLINIC | Age: 39
End: 2024-04-27
Payer: COMMERCIAL

## 2024-04-27 DIAGNOSIS — N76.0 BACTERIAL VAGINOSIS: Primary | ICD-10-CM

## 2024-04-27 DIAGNOSIS — B96.89 BACTERIAL VAGINOSIS: Primary | ICD-10-CM

## 2024-04-27 PROCEDURE — 99421 OL DIG E/M SVC 5-10 MIN: CPT | Performed by: FAMILY MEDICINE

## 2024-04-29 RX ORDER — METRONIDAZOLE 500 MG/1
500 TABLET ORAL 2 TIMES DAILY
Qty: 14 TABLET | Refills: 0 | Status: SHIPPED | OUTPATIENT
Start: 2024-04-29 | End: 2024-05-06

## 2024-04-29 NOTE — PATIENT INSTRUCTIONS
"Thank you for choosing us for your care. I have placed an order for a prescription so that you can start treatment. View your full visit summary for details by clicking on the link below. Your pharmacist will able to address any questions you may have about the medication.     If you re not feeling better within 2-3 days, please schedule an appointment.  You can schedule an appointment right here in [facility], or call [phone]  If the visit is for the same symptoms as your eVisit, we ll refund the cost of your eVisit if seen within seven days.    Bacterial Vaginosis: Care Instructions  Overview     Bacterial vaginosis is a condition in which there is excess growth of certain bacteria that are normally found in the vagina. Symptoms often include abnormal gray or yellow discharge with a \"fishy\" odor. It is not considered an infection that is spread through sexual contact.  Symptoms can be annoying and uncomfortable. But bacterial vaginosis does not usually cause other health problems. However, in some cases it can lead to more serious issues.  While bacterial vaginosis may go away on its own, most doctors use antibiotics to treat it. You may have been prescribed pills or vaginal cream. With treatment, bacterial vaginosis usually clears up in 5 to 7 days.  Follow-up care is a key part of your treatment and safety. Be sure to make and go to all appointments, and call your doctor if you are having problems. It's also a good idea to know your test results and keep a list of the medicines you take.  How can you care for yourself at home?  Take your antibiotics as directed. Do not stop taking them just because you feel better. You need to take the full course of antibiotics.  Do not eat or drink anything that contains alcohol if you are taking metronidazole or tinidazole.  Keep using your medicine if you start your period. Use pads instead of tampons while using a vaginal cream or suppository. Tampons can absorb the " "medicine.  Wear loose cotton clothing. Do not wear nylon and other materials that hold body heat and moisture close to the skin.  Do not scratch. Relieve itching with a cold pack or a cool bath.  Do not wash your vulva more than once a day. Use plain water or a mild, unscented soap. Do not douche.  When should you call for help?   Call your doctor now or seek immediate medical care if:    You have a fever.     You have new or worse pain in your vagina or pelvis.   Watch closely for changes in your health, and be sure to contact your doctor if:    You have new or worse vaginal itching or discharge.     You have unexpected vaginal bleeding.     You are not getting better as expected.     Your symptoms return after you finish the course of your medicine.   Where can you learn more?  Go to https://www.[employer].net/patiented  Enter X360 in the search box to learn more about \"Bacterial Vaginosis: Care Instructions.\"  Current as of: November 27, 2023               Content Version: 14.0    7513-0084 [employer].   Care instructions adapted under license by your healthcare professional. If you have questions about a medical condition or this instruction, always ask your healthcare professional. [employer] disclaims any warranty or liability for your use of this information.      "

## 2024-05-08 ENCOUNTER — VIRTUAL VISIT (OUTPATIENT)
Dept: FAMILY MEDICINE | Facility: CLINIC | Age: 39
End: 2024-05-08
Payer: COMMERCIAL

## 2024-05-08 DIAGNOSIS — F33.1 MODERATE EPISODE OF RECURRENT MAJOR DEPRESSIVE DISORDER (H): Primary | ICD-10-CM

## 2024-05-08 PROCEDURE — G2211 COMPLEX E/M VISIT ADD ON: HCPCS | Mod: 95 | Performed by: FAMILY MEDICINE

## 2024-05-08 PROCEDURE — 96127 BRIEF EMOTIONAL/BEHAV ASSMT: CPT | Mod: 95 | Performed by: FAMILY MEDICINE

## 2024-05-08 PROCEDURE — 99213 OFFICE O/P EST LOW 20 MIN: CPT | Mod: 95 | Performed by: FAMILY MEDICINE

## 2024-05-08 RX ORDER — BUPROPION HYDROCHLORIDE 300 MG/1
300 TABLET ORAL EVERY MORNING
Qty: 90 TABLET | Refills: 1 | Status: SHIPPED | OUTPATIENT
Start: 2024-05-08

## 2024-05-08 ASSESSMENT — ANXIETY QUESTIONNAIRES
2. NOT BEING ABLE TO STOP OR CONTROL WORRYING: MORE THAN HALF THE DAYS
7. FEELING AFRAID AS IF SOMETHING AWFUL MIGHT HAPPEN: SEVERAL DAYS
1. FEELING NERVOUS, ANXIOUS, OR ON EDGE: MORE THAN HALF THE DAYS
3. WORRYING TOO MUCH ABOUT DIFFERENT THINGS: MORE THAN HALF THE DAYS
5. BEING SO RESTLESS THAT IT IS HARD TO SIT STILL: NOT AT ALL
GAD7 TOTAL SCORE: 11
GAD7 TOTAL SCORE: 11
IF YOU CHECKED OFF ANY PROBLEMS ON THIS QUESTIONNAIRE, HOW DIFFICULT HAVE THESE PROBLEMS MADE IT FOR YOU TO DO YOUR WORK, TAKE CARE OF THINGS AT HOME, OR GET ALONG WITH OTHER PEOPLE: VERY DIFFICULT
4. TROUBLE RELAXING: MORE THAN HALF THE DAYS
6. BECOMING EASILY ANNOYED OR IRRITABLE: MORE THAN HALF THE DAYS

## 2024-05-08 ASSESSMENT — PATIENT HEALTH QUESTIONNAIRE - PHQ9: SUM OF ALL RESPONSES TO PHQ QUESTIONS 1-9: 11

## 2024-05-08 NOTE — PROGRESS NOTES
Teetee is a 39 year old who is being evaluated via a billable video visit.    How would you like to obtain your AVS? MyChart  If the video visit is dropped, the invitation should be resent by: Text to cell phone: [phone]  Will anyone else be joining your video visit? No      Assessment & Plan     1. Moderate episode of recurrent major depressive disorder (H)  Minimal improvement in mood since addition of Wellbutrin, recommend increasing to 300 mg daily with follow-up in 2 weeks.  At that time, consider restarting sertraline at 25 mg with anticipated dose escalation.  Encouraged asking for help with family and scheduling time away from her child so she can focus on herself.  - buPROPion (WELLBUTRIN XL) 300 MG 24 hr tablet; Take 1 tablet (300 mg) by mouth every morning  Dispense: 90 tablet; Refill: 1    The longitudinal plan of care for the diagnosis(es)/condition(s) as documented were addressed during this visit. Due to the added complexity in care, I will continue to support Teetee in the subsequent management and with ongoing continuity of care.    DO Diego Russell   Teetee is a 39 year old, presenting for the following health issues:  Depression (Symptoms have stayed the same)        5/8/2024    11:33 AM   Additional Questions   Roomed by MARIFER Buck CMA   Accompanied by -     Video Start Time:  1145    HPI     Last seen 4/16/24. At that time noted Increased depression and anxiety symptoms since the birth of her son in June 2023. Given elevated BMI and history of ADHD, initiated Wellbutrin 150 mg daily with follow-up today.    No notable change in symptoms since the addition of Wellbutrin.  No increased agitation or anxiety.  Has been trying to get out of the house with her son including going on playdates, but a [employer] park pass, Z[place].  Has been trying to meet up with coworkers and their children for socialization.  Has been speaking with her significant other about getting time for herself each  week or even every other week and they believe family would be more than willing to help.  Denies SI/HI.        1/2/2024     1:05 PM 4/16/2024     2:06 PM 5/8/2024    11:34 AM   PHQ   PHQ-9 Total Score 3 16 11   Q9: Thoughts of better off dead/self-harm past 2 weeks Not at all Not at all Not at all         3/23/2023     1:05 PM 4/16/2024     2:00 PM 5/8/2024    11:00 AM   JAE-7 SCORE   Total Score 15 (severe anxiety)     Total Score 15 11 11           Objective           Vitals:  No vitals were obtained today due to virtual visit.    Physical Exam   GENERAL: alert and no distress  EYES: Eyes grossly normal to inspection.  No discharge or erythema, or obvious scleral/conjunctival abnormalities.  RESP: No audible wheeze, cough, or visible cyanosis.    SKIN: Visible skin clear. No significant rash, abnormal pigmentation or lesions.  NEURO: Cranial nerves grossly intact.  Mentation and speech appropriate for age.  PSYCH: mentation appears normal, affect normal, depressed mood, tearful, judgement and insight intact        Video-Visit Details    Type of service:  Video Visit   Video End Time:11:59 AM  Originating Location (pt. Location): Home    Distant Location (provider location):  On-site  Platform used for Video Visit: Safia  Signed Electronically by: Rosa Frank DO

## 2024-05-22 ENCOUNTER — VIRTUAL VISIT (OUTPATIENT)
Dept: FAMILY MEDICINE | Facility: CLINIC | Age: 39
End: 2024-05-22
Payer: COMMERCIAL

## 2024-05-22 DIAGNOSIS — N92.1 BREAKTHROUGH BLEEDING WITH IUD: ICD-10-CM

## 2024-05-22 DIAGNOSIS — Z97.5 BREAKTHROUGH BLEEDING WITH IUD: ICD-10-CM

## 2024-05-22 DIAGNOSIS — F33.0 MILD EPISODE OF RECURRENT MAJOR DEPRESSIVE DISORDER (H): Primary | ICD-10-CM

## 2024-05-22 PROCEDURE — 96127 BRIEF EMOTIONAL/BEHAV ASSMT: CPT | Mod: 95 | Performed by: FAMILY MEDICINE

## 2024-05-22 PROCEDURE — G2211 COMPLEX E/M VISIT ADD ON: HCPCS | Mod: 95 | Performed by: FAMILY MEDICINE

## 2024-05-22 PROCEDURE — 99214 OFFICE O/P EST MOD 30 MIN: CPT | Mod: 95 | Performed by: FAMILY MEDICINE

## 2024-05-22 RX ORDER — DESOGESTREL AND ETHINYL ESTRADIOL 0.15-0.03
1 KIT ORAL DAILY
Qty: 28 TABLET | Refills: 0 | Status: SHIPPED | OUTPATIENT
Start: 2024-05-22

## 2024-05-22 ASSESSMENT — ANXIETY QUESTIONNAIRES
GAD7 TOTAL SCORE: 5
6. BECOMING EASILY ANNOYED OR IRRITABLE: SEVERAL DAYS
1. FEELING NERVOUS, ANXIOUS, OR ON EDGE: SEVERAL DAYS
IF YOU CHECKED OFF ANY PROBLEMS ON THIS QUESTIONNAIRE, HOW DIFFICULT HAVE THESE PROBLEMS MADE IT FOR YOU TO DO YOUR WORK, TAKE CARE OF THINGS AT HOME, OR GET ALONG WITH OTHER PEOPLE: SOMEWHAT DIFFICULT
7. FEELING AFRAID AS IF SOMETHING AWFUL MIGHT HAPPEN: SEVERAL DAYS
2. NOT BEING ABLE TO STOP OR CONTROL WORRYING: SEVERAL DAYS
4. TROUBLE RELAXING: NOT AT ALL
3. WORRYING TOO MUCH ABOUT DIFFERENT THINGS: SEVERAL DAYS
GAD7 TOTAL SCORE: 5
5. BEING SO RESTLESS THAT IT IS HARD TO SIT STILL: NOT AT ALL

## 2024-05-22 ASSESSMENT — PATIENT HEALTH QUESTIONNAIRE - PHQ9: SUM OF ALL RESPONSES TO PHQ QUESTIONS 1-9: 5

## 2024-05-22 NOTE — PROGRESS NOTES
Teetee is a 39 year old who is being evaluated via a billable video visit.    How would you like to obtain your AVS? MyChart  If the video visit is dropped, the invitation should be resent by: Text to cell phone: [phone]  Will anyone else be joining your video visit? No      Assessment & Plan     Mild episode of recurrent major depressive disorder (H24)  Notable improvement in depressive symptoms with addition of Wellbutrin 300 mg daily, along with additional family support to help care for her 11-month-old once per week.  Plan to continue current regimen and follow-up with any concerns.    Breakthrough bleeding with IUD  IUD placed approximately 1 month ago, but continues to have moderate spotting.  Will offer 1 month OCP to help thin endometrial lining.  Recommend in person follow-up if spotting persists following this.  - desogestrel-ethinyl estradiol (APRI) 0.15-30 MG-MCG tablet; Take 1 tablet by mouth daily    The longitudinal plan of care for the diagnosis(es)/condition(s) as documented were addressed during this visit. Due to the added complexity in care, I will continue to support Teetee in the subsequent management and with ongoing continuity of care.    Subjective   Teetee is a 39 year old, presenting for the following health issues:  Depression (Symptoms have gotten better )        5/22/2024    12:08 PM   Additional Questions   Roomed by LC   Accompanied by -     Video Start Time: 12:20 PM    HPI     Increased wellbutrin to 300 mg daily during last visit. Joe's stepmom watching their 11-month-old 1 day per week.  These 2 things have significantly improved her mood.  Feels like her medications are now taking effect, and she is able to have a date or self where she is able to get things done that she normally would not be able to.    Mirena IUD placed 4/18/24 has had heavy spotting since that time.  Minimal cramping, without pelvic pain.  Wondering how long bleeding will last.  Previously did not get  regular periods.        Objective             4/16/2024     2:06 PM 5/8/2024    11:34 AM 5/22/2024    12:08 PM   PHQ   PHQ-9 Total Score 16 11 5   Q9: Thoughts of better off dead/self-harm past 2 weeks Not at all Not at all Not at all          4/16/2024     2:00 PM 5/8/2024    11:00 AM 5/22/2024    12:00 PM   JAE-7 SCORE   Total Score 11 11 5         Vitals:  No vitals were obtained today due to virtual visit.    Physical Exam   GENERAL: alert and no distress  EYES: Eyes grossly normal to inspection.  No discharge or erythema, or obvious scleral/conjunctival abnormalities.  RESP: No audible wheeze, cough, or visible cyanosis.    SKIN: Visible skin clear. No significant rash, abnormal pigmentation or lesions.  NEURO: Cranial nerves grossly intact.  Mentation and speech appropriate for age.  PSYCH: Appropriate affect, tone, and pace of words          Video-Visit Details    Type of service:  Video Visit   Video End Time:12:37 PM  Originating Location (pt. Location): Home    Distant Location (provider location):  On-site  Platform used for Video Visit: Breanna  Signed Electronically by: Rosa Frank DO

## 2024-09-29 ENCOUNTER — HEALTH MAINTENANCE LETTER (OUTPATIENT)
Age: 39
End: 2024-09-29

## 2025-02-15 ENCOUNTER — HEALTH MAINTENANCE LETTER (OUTPATIENT)
Age: 40
End: 2025-02-15

## 2025-03-27 ENCOUNTER — OFFICE VISIT (OUTPATIENT)
Dept: FAMILY MEDICINE | Facility: CLINIC | Age: 40
End: 2025-03-27
Payer: COMMERCIAL

## 2025-03-27 VITALS
BODY MASS INDEX: 40.55 KG/M2 | RESPIRATION RATE: 18 BRPM | TEMPERATURE: 98.1 F | HEIGHT: 65 IN | SYSTOLIC BLOOD PRESSURE: 130 MMHG | HEART RATE: 93 BPM | DIASTOLIC BLOOD PRESSURE: 84 MMHG | OXYGEN SATURATION: 98 % | WEIGHT: 243.4 LBS

## 2025-03-27 DIAGNOSIS — Z13.220 LIPID SCREENING: ICD-10-CM

## 2025-03-27 DIAGNOSIS — Z12.31 VISIT FOR SCREENING MAMMOGRAM: ICD-10-CM

## 2025-03-27 DIAGNOSIS — E11.65 TYPE 2 DIABETES MELLITUS WITH HYPERGLYCEMIA, WITHOUT LONG-TERM CURRENT USE OF INSULIN (H): ICD-10-CM

## 2025-03-27 DIAGNOSIS — Z00.00 ROUTINE GENERAL MEDICAL EXAMINATION AT A HEALTH CARE FACILITY: Primary | ICD-10-CM

## 2025-03-27 DIAGNOSIS — F90.0 ATTENTION DEFICIT HYPERACTIVITY DISORDER (ADHD), PREDOMINANTLY INATTENTIVE TYPE: ICD-10-CM

## 2025-03-27 DIAGNOSIS — E66.01 MORBID OBESITY (H): ICD-10-CM

## 2025-03-27 DIAGNOSIS — Z79.899 CONTROLLED SUBSTANCE AGREEMENT SIGNED: ICD-10-CM

## 2025-03-27 DIAGNOSIS — R73.03 PREDIABETES: ICD-10-CM

## 2025-03-27 LAB
ALBUMIN SERPL BCG-MCNC: 4.4 G/DL (ref 3.5–5.2)
ALP SERPL-CCNC: 70 U/L (ref 40–150)
ALT SERPL W P-5'-P-CCNC: 30 U/L (ref 0–50)
AMPHETAMINES UR QL SCN: ABNORMAL
ANION GAP SERPL CALCULATED.3IONS-SCNC: 13 MMOL/L (ref 7–15)
AST SERPL W P-5'-P-CCNC: 22 U/L (ref 0–45)
BARBITURATES UR QL SCN: ABNORMAL
BENZODIAZ UR QL SCN: ABNORMAL
BILIRUB SERPL-MCNC: 0.7 MG/DL
BUN SERPL-MCNC: 9 MG/DL (ref 6–20)
BZE UR QL SCN: ABNORMAL
CALCIUM SERPL-MCNC: 9.5 MG/DL (ref 8.8–10.4)
CANNABINOIDS UR QL SCN: ABNORMAL
CHLORIDE SERPL-SCNC: 100 MMOL/L (ref 98–107)
CHOLEST SERPL-MCNC: 179 MG/DL
CREAT SERPL-MCNC: 0.58 MG/DL (ref 0.51–0.95)
EGFRCR SERPLBLD CKD-EPI 2021: >90 ML/MIN/1.73M2
ERYTHROCYTE [DISTWIDTH] IN BLOOD BY AUTOMATED COUNT: 11.7 % (ref 10–15)
EST. AVERAGE GLUCOSE BLD GHB EST-MCNC: 303 MG/DL
FENTANYL UR QL: ABNORMAL
GLUCOSE SERPL-MCNC: 259 MG/DL (ref 70–99)
HBA1C MFR BLD: 12.2 % (ref 0–5.6)
HCO3 SERPL-SCNC: 24 MMOL/L (ref 22–29)
HCT VFR BLD AUTO: 45.4 % (ref 35–47)
HDLC SERPL-MCNC: 47 MG/DL
HGB BLD-MCNC: 16.1 G/DL (ref 11.7–15.7)
LDLC SERPL CALC-MCNC: 99 MG/DL
MCH RBC QN AUTO: 29.7 PG (ref 26.5–33)
MCHC RBC AUTO-ENTMCNC: 35.5 G/DL (ref 31.5–36.5)
MCV RBC AUTO: 84 FL (ref 78–100)
NONHDLC SERPL-MCNC: 132 MG/DL
OPIATES UR QL SCN: ABNORMAL
PCP QUAL URINE (ROCHE): ABNORMAL
PLATELET # BLD AUTO: 290 10E3/UL (ref 150–450)
POTASSIUM SERPL-SCNC: 3.9 MMOL/L (ref 3.4–5.3)
PROT SERPL-MCNC: 7.1 G/DL (ref 6.4–8.3)
RBC # BLD AUTO: 5.43 10E6/UL (ref 3.8–5.2)
SODIUM SERPL-SCNC: 137 MMOL/L (ref 135–145)
TRIGL SERPL-MCNC: 164 MG/DL
WBC # BLD AUTO: 9.7 10E3/UL (ref 4–11)

## 2025-03-27 PROCEDURE — 90471 IMMUNIZATION ADMIN: CPT | Performed by: FAMILY MEDICINE

## 2025-03-27 PROCEDURE — 99213 OFFICE O/P EST LOW 20 MIN: CPT | Mod: 25 | Performed by: FAMILY MEDICINE

## 2025-03-27 PROCEDURE — 3079F DIAST BP 80-89 MM HG: CPT | Performed by: FAMILY MEDICINE

## 2025-03-27 PROCEDURE — 99396 PREV VISIT EST AGE 40-64: CPT | Mod: 25 | Performed by: FAMILY MEDICINE

## 2025-03-27 PROCEDURE — 36415 COLL VENOUS BLD VENIPUNCTURE: CPT | Performed by: FAMILY MEDICINE

## 2025-03-27 PROCEDURE — 83036 HEMOGLOBIN GLYCOSYLATED A1C: CPT | Performed by: FAMILY MEDICINE

## 2025-03-27 PROCEDURE — 80053 COMPREHEN METABOLIC PANEL: CPT | Performed by: FAMILY MEDICINE

## 2025-03-27 PROCEDURE — 80061 LIPID PANEL: CPT | Performed by: FAMILY MEDICINE

## 2025-03-27 PROCEDURE — 85027 COMPLETE CBC AUTOMATED: CPT | Performed by: FAMILY MEDICINE

## 2025-03-27 PROCEDURE — 80307 DRUG TEST PRSMV CHEM ANLYZR: CPT | Performed by: FAMILY MEDICINE

## 2025-03-27 PROCEDURE — 90677 PCV20 VACCINE IM: CPT | Performed by: FAMILY MEDICINE

## 2025-03-27 PROCEDURE — 3075F SYST BP GE 130 - 139MM HG: CPT | Performed by: FAMILY MEDICINE

## 2025-03-27 PROCEDURE — 91320 SARSCV2 VAC 30MCG TRS-SUC IM: CPT | Performed by: FAMILY MEDICINE

## 2025-03-27 PROCEDURE — 90480 ADMN SARSCOV2 VAC 1/ONLY CMP: CPT | Performed by: FAMILY MEDICINE

## 2025-03-27 RX ORDER — METHYLPHENIDATE HYDROCHLORIDE 36 MG/1
36 TABLET ORAL EVERY MORNING
Qty: 30 TABLET | Refills: 0 | Status: SHIPPED | OUTPATIENT
Start: 2025-03-27

## 2025-03-27 SDOH — HEALTH STABILITY: PHYSICAL HEALTH: ON AVERAGE, HOW MANY DAYS PER WEEK DO YOU ENGAGE IN MODERATE TO STRENUOUS EXERCISE (LIKE A BRISK WALK)?: 1 DAY

## 2025-03-27 ASSESSMENT — PATIENT HEALTH QUESTIONNAIRE - PHQ9
10. IF YOU CHECKED OFF ANY PROBLEMS, HOW DIFFICULT HAVE THESE PROBLEMS MADE IT FOR YOU TO DO YOUR WORK, TAKE CARE OF THINGS AT HOME, OR GET ALONG WITH OTHER PEOPLE: SOMEWHAT DIFFICULT
SUM OF ALL RESPONSES TO PHQ QUESTIONS 1-9: 7
SUM OF ALL RESPONSES TO PHQ QUESTIONS 1-9: 7

## 2025-03-27 ASSESSMENT — SOCIAL DETERMINANTS OF HEALTH (SDOH): HOW OFTEN DO YOU GET TOGETHER WITH FRIENDS OR RELATIVES?: ONCE A WEEK

## 2025-03-27 NOTE — LETTER
[facility]  03/27/25  Patient: Teetee Alvarado  YOB: 1985  Medical Record Number: [id number]                                                                                  Non-Opioid Controlled Substance Agreement    This is an agreement between you and your provider regarding safe and appropriate use of controlled substances prescribed by your care team. Controlled substances are?medicines that can cause physical and mental dependence (abuse).     There are strict laws about having and using these medicines. We here at [facility] are  committed to working with you in your efforts to get better. To support you in this work, we'll help you schedule regular office appointments for medicine refills. If we must cancel or change your appointment for any reason, we'll make sure you have enough medicine to last until your next appointment.     As a Provider, I will:   Listen carefully to your concerns while treating you with respect.   Recommend a treatment plan that I believe is in your best interest and may involve therapies other than medicine.    Talk with you often about the possible benefits and the risk of harm of any medicine that we prescribe for you.  Assess the safety of this medicine and check how well it works.    Provide a plan on how to taper (discontinue or go off) using this medicine if the decision is made to stop its use.      ::  As a Patient, I understand controlled substances:     Are prescribed by my care provider to help me function or work and manage my condition(s).?  Are strong medicines and can cause serious side effects.     Need to be taken exactly as prescribed.?Combining controlled substances with certain medicines or chemicals (such as illegal drugs, alcohol, sedatives, sleeping pills, and benzodiazepines) can be dangerous or even fatal.? If I stop taking my medicines suddenly, I may have severe withdrawal symptoms.     The risks, benefits,  and side effects of these medicine(s) were explained to me. I agree that:    I will take part in other treatments as advised by my care team. This may be psychiatry or counseling, physical therapy, behavioral therapy, group treatment or a referral to specialist.    I will keep all my appointments and understand this is part of the monitoring of controlled substances.?My care team may require an office visit for EVERY controlled substance refill. If I miss appointments or don t follow instructions, my care team may stop my medicine    I will take my medicines as prescribed. I will not change the dose or schedule unless my care team tells me to. There will be no refills if I run out early.      I may be asked to come to the clinic and complete a urine drug test or complete a pill count. If I don t give a urine sample or participate in a pill count, the care team may stop my medicine.    I will only receive controlled substance prescriptions from this clinic. If I am treated by another provider, I will tell them that I am taking controlled substances and that I have a treatment agreement with this provider. I will inform my [facility] care team within one business day if I am given a prescription for any controlled substance by another healthcare provider. My [facility] care team can contact other providers and pharmacists about my use of any medicines.    It is up to me to make sure that I don't run out of my medicines on weekends or holidays.?If my care team is willing to refill my prescription without a visit, I must request refills only during office hours. Refills may take up to 3 business days to process. I will use one pharmacy to fill all my controlled substance prescriptions. I will notify the clinic about any changes to my insurance or medicine availability.    I am responsible for my prescriptions. If the medicine/prescription is lost, stolen or destroyed, it will not be replaced.?I also agree  not to share controlled substance medicines with anyone.     I am aware I should not use any illegal or recreational drugs. I agree not to drink alcohol unless my care team says I can.     If I enroll in the [place] [facility] Cannabis program, I will tell my care team before my next refill.    I will tell my care team right away if I become pregnant, have a new medical problem treated outside of my regular clinic, or have a change in my medicines.     I understand that this medicine can affect my thinking, judgment and reaction time.? Alcohol and drugs affect the brain and body, which can affect the safety of my driving. Being under the influence of alcohol or drugs can affect my decision-making, behaviors, personal safety and the safety of others. Driving while impaired (DWI) can occur if a person is driving, operating or in physical control of a car, motorcycle, boat, snowmobile, ATV, motorbike, off-road vehicle or any other motor vehicle ([place] Statute 169A.20). I understand the risk if I choose to drive or operate any vehicle or machinery.    I understand that if I do not follow any of the conditions above, my prescriptions or treatment may be stopped or changed.   I agree that my provider, clinic care team and pharmacy may work with any city, state or federal law enforcement agency that investigates the misuse, sale or other diversion of my controlled medicine. I will allow my provider to discuss my care with, or share a copy of, this agreement with any other treating provider, pharmacy or emergency room where I receive care.     I have read this agreement and have asked questions about anything I did not understand.    ________________________________________________________  Patient Signature - Teetee Alvarado     ___________________                   Date     ________________________________________________________  Provider Signature - Rosa Frank DO       ___________________                   Date      ________________________________________________________  Witness Signature (required if provider not present while patient signing)          ___________________                   Date

## 2025-03-27 NOTE — PATIENT INSTRUCTIONS
Patient Education   Preventive Care Advice   This is general advice given by our system to help you stay healthy. However, your care team may have specific advice just for you. Please talk to your care team about your preventive care needs.  Nutrition  Eat 5 or more servings of fruits and vegetables each day.  Try wheat bread, brown rice and whole grain pasta (instead of white bread, rice, and pasta).  Get enough calcium and vitamin D. Check the label on foods and aim for 100% of the RDA (recommended daily allowance).  Lifestyle  Exercise at least 150 minutes each week  (30 minutes a day, 5 days a week).  Do muscle strengthening activities 2 days a week. These help control your weight and prevent disease.  No smoking.  Wear sunscreen to prevent skin cancer.  Have a dental exam and cleaning every 6 months.  Yearly exams  See your health care team every year to talk about:  Any changes in your health.  Any medicines your care team has prescribed.  Preventive care, family planning, and ways to prevent chronic diseases.  Shots (vaccines)   HPV shots (up to age 26), if you've never had them before.  Hepatitis B shots (up to age 59), if you've never had them before.  COVID-19 shot: Get this shot when it's due.  Flu shot: Get a flu shot every year.  Tetanus shot: Get a tetanus shot every 10 years.  Pneumococcal, hepatitis A, and RSV shots: Ask your care team if you need these based on your risk.  Shingles shot (for age 50 and up)  General health tests  Diabetes screening:  Starting at age 35, Get screened for diabetes at least every 3 years.  If you are younger than age 35, ask your care team if you should be screened for diabetes.  Cholesterol test: At age 39, start having a cholesterol test every 5 years, or more often if advised.  Bone density scan (DEXA): At age 50, ask your care team if you should have this scan for osteoporosis (brittle bones).  Hepatitis C: Get tested at least once in your life.  STIs (sexually  transmitted infections)  Before age 24: Ask your care team if you should be screened for STIs.  After age 24: Get screened for STIs if you're at risk. You are at risk for STIs (including HIV) if:  You are sexually active with more than one person.  You don't use condoms every time.  You or a partner was diagnosed with a sexually transmitted infection.  If you are at risk for HIV, ask about PrEP medicine to prevent HIV.  Get tested for HIV at least once in your life, whether you are at risk for HIV or not.  Cancer screening tests  Cervical cancer screening: If you have a cervix, begin getting regular cervical cancer screening tests starting at age 21.  Breast cancer scan (mammogram): If you've ever had breasts, begin having regular mammograms starting at age 40. This is a scan to check for breast cancer.  Colon cancer screening: It is important to start screening for colon cancer at age 45.  Have a colonoscopy test every 10 years (or more often if you're at risk) Or, ask your provider about stool tests like a FIT test every year or Cologuard test every 3 years.  To learn more about your testing options, visit:   .  For help making a decision, visit:   https://bit.ly/hq76136.  Prostate cancer screening test: If you have a prostate, ask your care team if a prostate cancer screening test (PSA) at age 55 is right for you.  Lung cancer screening: If you are a current or former smoker ages 50 to 80, ask your care team if ongoing lung cancer screenings are right for you.  For informational purposes only. Not to replace the advice of your health care provider. Copyright   2023 [facility]. All rights reserved. Clinically reviewed by the [employer] [facility]. Chug 247272 - REV 01/24.  Learning About Depression Screening  What is depression screening?  Depression screening is a way to see if you have depression symptoms. It may be done by a doctor or counselor. It's often part of a routine  "checkup. That's because your mental health is just as important as your physical health.  Depression is a mental health condition that affects how you feel, think, and act. You may:  Have less energy.  Lose interest in your daily activities.  Feel sad and grouchy for a long time.  Depression is very common. It affects people of all ages.  Many things can lead to depression. Some people become depressed after they have a stroke or find out they have a major illness like cancer or heart disease. The death of a loved one or a breakup may lead to depression. It can run in families. Most experts believe that a combination of inherited genes and stressful life events can cause it.  What happens during screening?  You may be asked to fill out a form about your depression symptoms. You and the doctor will discuss your answers. The doctor may ask you more questions to learn more about how you think, act, and feel.  What happens after screening?  If you have symptoms of depression, your doctor will talk to you about your options.  Doctors usually treat depression with medicines or counseling. Often, combining the two works best. Many people don't get help because they think that they'll get over the depression on their own. But people with depression may not get better unless they get treatment.  The cause of depression is not well understood. There may be many factors involved. But if you have depression, it's not your fault.  A serious symptom of depression is thinking about death or suicide. If you or someone you care about talks about this or about feeling hopeless, get help right away.  It's important to know that depression can be treated. Medicine, counseling, and self-care may help.  Where can you learn more?  Go to https://www.healthwise.net/patiented  Enter T185 in the search box to learn more about \"Learning About Depression Screening.\"  Current as of: July 31, 2024  Content Version: 14.4    2161-2893 Janes " [employer].   Care instructions adapted under license by your healthcare professional. If you have questions about a medical condition or this instruction, always ask your healthcare professional. [facility] [employer] disclaims any warranty or liability for your use of this information.    Substance Use Disorder: Care Instructions  Overview     You can improve your life and health by stopping your use of alcohol or drugs. When you don't drink or use drugs, you may feel and sleep better. You may get along better with your family, friends, and coworkers. There are medicines and programs that can help with substance use disorder.  How can you care for yourself at home?  Here are some ways to help you stay sober and prevent relapse.  If you have been given medicine to help keep you sober or reduce your cravings, be sure to take it exactly as prescribed.  Talk to your doctor about programs that can help you stop using drugs or drinking alcohol.  Do not keep alcohol or drugs in your home.  Plan ahead. Think about what you'll say if other people ask you to drink or use drugs. Try not to spend time with people who drink or use drugs.  Use the time and money spent on drinking or drugs to do something that's important to you.  Preventing a relapse  Have a plan to deal with relapse. Learn to recognize changes in your thinking that lead you to drink or use drugs. Get help before you start to drink or use drugs again.  Try to stay away from situations, friends, or places that may lead you to drink or use drugs.  If you feel the need to drink alcohol or use drugs again, seek help right away. Call a trusted friend or family member. Some people get support from organizations such as Narcotics Anonymous or [employer] or from treatment facilities.  If you relapse, get help as soon as you can. Some people make a plan with another person that outlines what they want that person to do for them if they relapse. The plan  usually includes how to handle the relapse and who to notify in case of relapse.  Don't give up. Remember that a relapse doesn't mean that you have failed. Use the experience to learn the triggers that lead you to drink or use drugs. Then quit again. Recovery is a lifelong process. Many people have several relapses before they are able to quit for good.  Follow-up care is a key part of your treatment and safety. Be sure to make and go to all appointments, and call your doctor if you are having problems. It's also a good idea to know your test results and keep a list of the medicines you take.  When should you call for help?   Call 911  anytime you think you may need emergency care. For example, call if you or someone else:    Has overdosed or has withdrawal signs. Be sure to tell the emergency workers that you are or someone else is using or trying to quit using drugs. Overdose or withdrawal signs may include:  Losing consciousness.  Seizure.  Seeing or hearing things that aren't there (hallucinations).     Is thinking or talking about suicide or harming others.   Where to get help 24 hours a day, 7 days a week   If you or someone you know talks about suicide, self-harm, a mental health crisis, a substance use crisis, or any other kind of emotional distress, get help right away. You can:    Call the Suicide and Crisis Lifeline at 218.     Call 6-624-167-KMDB (1-223.485.5550).     Text HOME to 847251 to access the Crisis Text Line.   Consider saving these numbers in your phone.  Go to [employer].org for more information or to chat online.  Call your doctor now or seek immediate medical care if:    You are having withdrawal symptoms. These may include nausea or vomiting, sweating, shakiness, and anxiety.   Watch closely for changes in your health, and be sure to contact your doctor if:    You have a relapse.     You need more help or support to stop.   Where can you learn more?  Go to  "https://www.healthNifti.net/patiented  Enter H573 in the search box to learn more about \"Substance Use Disorder: Care Instructions.\"  Current as of: August 20, 2024  Content Version: 14.4 2024-2025 [employer].   Care instructions adapted under license by your healthcare professional. If you have questions about a medical condition or this instruction, always ask your healthcare professional. [employer] disclaims any warranty or liability for your use of this information.       "

## 2025-03-27 NOTE — PROGRESS NOTES
Preventive Care Visit  [facility]  Rosa Frank DO, Family Medicine  Mar 27, 2025  {Provider  Link to SmartSet :313027}    Assessment & Plan     Routine general medical examination at a health care facility  Counseling  Appropriate preventive services were addressed with this patient via screening, questionnaire, or discussion as appropriate for fall prevention, nutrition, physical activity, Tobacco-use cessation, social engagement, weight loss and cognition.  Checklist reviewing preventive services available has been given to the patient.  Reviewed patient's diet, addressing concerns and/or questions.   She is at risk for lack of exercise and has been provided with information to increase physical activity for the benefit of her well-being.   The patient was instructed to see the dentist every 6 months.   The patient's PHQ-9 score is consistent with mild depression. She was provided with information regarding depression.   - CBC with platelets  - Comprehensive metabolic panel (BMP + Alb, Alk Phos, ALT, AST, Total. Bili, TP)    Visit for screening mammogram  - MA Screening Bilateral w/ Jase; Future    Lipid screening  -lipid screening    Prediabetes  - Hemoglobin A1c    Attention deficit hyperactivity disorder (ADHD), predominantly inattentive type  Prior diagnosis, though has been off medication for ADHD for multiple years.  Notes significant inattention symptoms so discussed reintroduction of consider at lower dose, 36 mg daily.  Update UDS/CSA today.   reviewed and appropriate.  Follow-up in 1 month for dose adjustment if needed otherwise routine 6-month visits.  - methylphenidate HCl ER, OSM, (CONCERTA) 36 MG CR tablet; Take 1 tablet (36 mg) by mouth every morning.  - Urine Drug Screen    Morbid obesity (H)  Has had significant diet/lifestyle adjustments since the birth of her son.  Suspect this is contributing to weight loss, but will rule out alternative etiologies with  "screening labs today.  BMI  Estimated body mass index is 40.5 kg/m  as calculated from the following:    Height as of this encounter: 1.651 m (5' 5\").    Weight as of this encounter: 110.4 kg (243 lb 6.4 oz).   Weight management plan: Discussed healthy diet and exercise guidelines    Patient has been advised of split billing requirements and indicates understanding: Yes        Nicotine Cessation  She reports that she has been smoking vaping device. She has never used smokeless tobacco.  Nicotine Cessation Plan  Self help information given to patient    The longitudinal plan of care for the diagnosis(es)/condition(s) as documented were addressed during this visit. Due to the added complexity in care, I will continue to support Teetee in the subsequent management and with ongoing continuity of care.      Diego Kingsley is a 40 year old, presenting for the following:  Physical (Fasting labs, ) and Weight Problem (Lost 40 pounds since last visit, not sure why)        3/27/2025     2:02 PM   Additional Questions   Roomed by Cora grady CMA          HPI    Wt Readings from Last 4 Encounters:   03/27/25 110.4 kg (243 lb 6.4 oz)   04/18/24 129.3 kg (285 lb)   01/02/24 133.1 kg (293 lb 6.4 oz)   08/30/23 122.5 kg (270 lb)     Stopped wellbutrin due to insurance changes. Seasonal depression, excited about warmer weather coming.  Continues to struggle with ADHD symptoms.  Inattention, lack of focus, difficulty completing tasks, flight of ideas.  Had been on Concerta up to 72 mg daily.    Advance Care Planning  Patient does not have a Health Care Directive: Discussed advance care planning with patient; however, patient declined at this time.      3/27/2025   General Health   How would you rate your overall physical health? Good   Feel stress (tense, anxious, or unable to sleep) Only a little   (!) STRESS CONCERN      3/27/2025   Nutrition   Three or more servings of calcium each day? (!) I DON'T KNOW   Diet: Regular (no " restrictions)   How many servings of fruit and vegetables per day? (!) 2-3   How many sweetened beverages each day? (!) 2         3/27/2025   Exercise   Days per week of moderate/strenous exercise 1 day   (!) EXERCISE CONCERN      3/27/2025   Social Factors   Frequency of gathering with friends or relatives Once a week   Worry food won't last until get money to buy more No   Food not last or not have enough money for food? No   Do you have housing? (Housing is defined as stable permanent housing and does not include staying ouside in a car, in a tent, in an abandoned building, in an overnight shelter, or couch-surfing.) Yes   Are you worried about losing your housing? No   Lack of transportation? No   Unable to get utilities (heat,electricity)? No         3/27/2025   Dental   Dentist two times every year? (!) NO         Today's PHQ-9 Score:       3/27/2025     1:53 PM   PHQ-9 SCORE   PHQ-9 Total Score MyChart 7 (Mild depression)   PHQ-9 Total Score 7        Patient-reported         3/27/2025   Substance Use   Alcohol more than 3/day or more than 7/wk Not Applicable   Do you use any other substances recreationally? (!) CANNABIS PRODUCTS     Social History     Tobacco Use    Smoking status: Every Day     Types: Vaping Device    Smokeless tobacco: Never    Tobacco comments:     vaping-daily   Substance Use Topics    Alcohol use: Not Currently    Drug use: Yes     Frequency: 7.0 times per week     Types: Marijuana     Comment: Drug use: daily     {Provider  If there are gaps in the social history shown above, please follow the link to update and then refresh the note Link to Social and Substance History :[id number]}     Mammogram Screening - Mammogram every 1-2 years updated in Health Maintenance based on mutual decision making        3/27/2025   STI Screening   New sexual partner(s) since last STI/HIV test? No     History of abnormal Pap smear: No - age 30- 64 PAP with HPV every 5 years recommended        Latest Ref Rng &  "Units 8/30/2023    11:23 AM   PAP / HPV   PAP  Negative for Intraepithelial Lesion or Malignancy (NILM)    HPV 16 DNA Negative Negative    HPV 18 DNA Negative Negative    Other HR HPV Negative Negative      ASCVD Risk   The 10-year ASCVD risk score (Debbi KENT, et al., 2019) is: 3.8%    Values used to calculate the score:      Age: 40 years      Sex: Female      Is Non- : No      Diabetic: Yes      Tobacco smoker: Yes      Systolic Blood Pressure: 130 mmHg      Is BP treated: No      HDL Cholesterol: 47 mg/dL      Total Cholesterol: 155 mg/dL        3/27/2025   Contraception/Family Planning   Questions about contraception or family planning No     {Provider  REQUIRED FOR AWV Use the storyboard to review patient history, after sections have been marked as reviewed, refresh note to capture documentation:[id number]}   Reviewed and updated as needed this visit by Provider   Tobacco  Allergies  Meds  Problems  Med Hx  Surg Hx  Fam Hx               Objective    Exam  /84 (BP Location: Left arm, Patient Position: Sitting, Cuff Size: Adult Large)   Pulse 93   Temp 98.1  F (36.7  C) (Oral)   Resp 18   Ht 1.651 m (5' 5\")   Wt 110.4 kg (243 lb 6.4 oz)   LMP  (LMP Unknown)   SpO2 98%   BMI 40.50 kg/m     Estimated body mass index is 40.5 kg/m  as calculated from the following:    Height as of this encounter: 1.651 m (5' 5\").    Weight as of this encounter: 110.4 kg (243 lb 6.4 oz).    Physical Exam  GENERAL: alert and no distress  EYES: Eyes grossly normal to inspection, PERRL and conjunctivae and sclerae normal  HENT: ear canals and TM's normal, nose and mouth without ulcers or lesions  NECK: no adenopathy, no asymmetry, masses, or scars  RESP: lungs clear to auscultation - no rales, rhonchi or wheezes  CV: regular rate and rhythm, normal S1 S2, no S3 or S4, no murmur, click or rub, no peripheral edema  ABDOMEN: soft, nontender  MS: no gross musculoskeletal defects noted, no " edema  SKIN: no suspicious lesions or rashes  NEURO: Normal strength and tone, mentation intact and speech normal  PSYCH: mentation appears normal, affect normal/bright, inattention    Prior to immunization administration, verified patients identity using patient s name and date of birth. Please see Immunization Activity for additional information.     Screening Questionnaire for Adult Immunization    Are you sick today?   No   Do you have allergies to medications, food, a vaccine component or latex?   No   Have you ever had a serious reaction after receiving a vaccination?   No   Do you have a long-term health problem with heart, lung, kidney, or metabolic disease (e.g., diabetes), asthma, a blood disorder, no spleen, complement component deficiency, a cochlear implant, or a spinal fluid leak?  Are you on long-term aspirin therapy?   No   Do you have cancer, leukemia, HIV/AIDS, or any other immune system problem?   No   Do you have a parent, brother, or sister with an immune system problem?   No   In the past 3 months, have you taken medications that affect  your immune system, such as prednisone, other steroids, or anticancer drugs; drugs for the treatment of rheumatoid arthritis, Crohn s disease, or psoriasis; or have you had radiation treatments?   No   Have you had a seizure, or a brain or other nervous system problem?   No   During the past year, have you received a transfusion of blood or blood    products, or been given immune (gamma) globulin or antiviral drug?   No   For women: Are you pregnant or is there a chance you could become       pregnant during the next month?   No   Have you received any vaccinations in the past 4 weeks?   No     Immunization questionnaire answers were all negative.      Patient instructed to remain in clinic for 15 minutes afterwards, and to report any adverse reactions.     Screening performed by Janey Arciniega MA on 3/27/2025 at 3:49 PM.         Signed Electronically by:  Rosa Frank DO  {Email feedback regarding this note to [email or web address]   :[id number]}  Answers submitted by the patient for this visit:  Patient Health Questionnaire (Submitted on 3/27/2025)  If you checked off any problems, how difficult have these problems made it for you to do your work, take care of things at home, or get along with other people?: Somewhat difficult  PHQ9 TOTAL SCORE: 7

## 2025-03-31 ENCOUNTER — MYC MEDICAL ADVICE (OUTPATIENT)
Dept: FAMILY MEDICINE | Facility: CLINIC | Age: 40
End: 2025-03-31
Payer: COMMERCIAL

## 2025-03-31 DIAGNOSIS — F33.0 MILD EPISODE OF RECURRENT MAJOR DEPRESSIVE DISORDER: Primary | ICD-10-CM

## 2025-03-31 RX ORDER — BUPROPION HYDROCHLORIDE 150 MG/1
150 TABLET ORAL EVERY MORNING
Qty: 30 TABLET | Refills: 1 | Status: SHIPPED | OUTPATIENT
Start: 2025-03-31

## 2025-04-10 ENCOUNTER — MYC MEDICAL ADVICE (OUTPATIENT)
Dept: EDUCATION SERVICES | Facility: CLINIC | Age: 40
End: 2025-04-10

## 2025-04-10 ENCOUNTER — VIRTUAL VISIT (OUTPATIENT)
Dept: EDUCATION SERVICES | Facility: CLINIC | Age: 40
End: 2025-04-10
Attending: FAMILY MEDICINE
Payer: COMMERCIAL

## 2025-04-10 DIAGNOSIS — E11.65 TYPE 2 DIABETES MELLITUS WITH HYPERGLYCEMIA, WITHOUT LONG-TERM CURRENT USE OF INSULIN (H): ICD-10-CM

## 2025-04-10 DIAGNOSIS — E11.65 TYPE 2 DIABETES MELLITUS WITH HYPERGLYCEMIA, WITHOUT LONG-TERM CURRENT USE OF INSULIN (H): Primary | ICD-10-CM

## 2025-04-10 RX ORDER — LANCETS
EACH MISCELLANEOUS
Qty: 350 EACH | Refills: 1 | Status: SHIPPED | OUTPATIENT
Start: 2025-04-10

## 2025-04-10 RX ORDER — HYDROCHLOROTHIAZIDE 12.5 MG/1
CAPSULE ORAL
Qty: 6 EACH | Refills: 1 | OUTPATIENT
Start: 2025-04-10

## 2025-04-10 RX ORDER — METFORMIN HYDROCHLORIDE 500 MG/1
500 TABLET, EXTENDED RELEASE ORAL
Qty: 90 TABLET | Refills: 1 | Status: SHIPPED | OUTPATIENT
Start: 2025-04-10

## 2025-04-10 NOTE — PROGRESS NOTES
Virtual Visit Details    Type of service:  Video Visit   Video Start Time: 3:26 PM  Video End Time: 4: 28 PM    Originating Location (pt. Location): Home    Distant Location (provider location):  Off-site  Platform used for Video Visit: Breanna    Diabetes Self-Management Education & Support  Teetee Alvarado presents today for education related to Type 2 diabetes    Patient is being treated with:  Diet  She is accompanied by self    Year of diagnosis: 2021  Referring provider:  Dr. Frank  Living Situation: Home with partner (jesus) and son (almost 2 years)  Employment: -one day per week    PATIENT CONCERNS/REASON FOR REFERRAL   Meal planning    ASSESSMENT:    Taking Medication:     Current Diabetes Management per Patient:  Taking diabetes medications? no    Monitoring  Patient glucose self monitoring as follows: never  BG meter: Contour Next     Patient's most recent   Lab Results   Component Value Date    A1C 12.2 03/27/2025      Patient's A1C goal: <7.0    Activity: Biking-up to 17 miles, walks daily    Healthy Eating:   Patient currently eats 2-3 meals, 0-1 snacks per day   Breakfast: Coffee, yogurt, banana, protein bar  Lunch: Chicken nuggets, peas,1/2 banana, club sandwich ,carrots/celery  Dinner: Chicken caesar salad, garlic bread, protein, veggie, pasta, potato  Snacks: Goldfish  Water throughout day, diet coke  Alcohol: None    Problem Solving:    Patient is at risk of hypoglycemia?: NO  Hospitalizations for hyper or hypoglycemia: No    Healthy Coping and Stress Management:   Sources of stress identified by patient: My health  Coping mechanisms identified by patient:  Other (please specify):  Dates with fiance , outdoor time with son      EDUCATION and INSTRUCTION PROVIDED AT THIS VISIT:    T2 seen for comprehensive review at the request of Dr. Frank. Teetee was diagnosed with T2 in 2021, initially treated with Metformin however discontinued after one year due to GI side effects.She did have GDM  with use of insulin approximately 3 years ago. A1C=12.2 she correlates with poor diet and lack of activity. Since recent A1C she has been eating healthier, reducing CHO's, and biking. She does not check glucose; test strips  and no lancets. She is requesting CGM, previously used Robert 3 during pregnancy. Supplies and Robert 3+ ordered today. No history of pancreatitis, thyroid cancer, or gall bladder concerns. Kidney functions normal. Will start Mounjaro 2.5 mg weekly along with Metformin  mg with dinner. Follow up next week via My Chart and in 4 weeks.    Topics Reviewed:  Pathophysiology and progression of diagnosis  Target blood sugar goals  Target A1C goal  Function of liver in the presence of diabetes  Effect that carbohydrates has on blood sugar  Total carbohydrate in grams per meal goals  Benefit of exercise  Ozempic, Mounjaro; MOA, pen demonstration, side effects, dosing  Metformin MOA    Education Materials Provided: Healthy Living with Diabetes, Guide to Counting Carbohydrates via links    Patient-stated goal written and given to Teetee Alvarado.  Verbalized and demonstrated understanding of instructions.   See patient instructions  AVS printed and given to patient-via My Chart    PLAN:  *Start Metformin 500 mg with dinner  *Start Mounjaro 2.5 mg weekly  *If unable to start sensor, check blood sugars before meals and before bed  *Fasting blood sugar goal   *Blood sugar goal 2 hours after meal is <180  *Keep up exercise regimen!    FOLLOW-UP:    *Please My Chart me blood sugars next week  * Video Visit with Madelyn at 3:30pm    Time spent with patient at today's visit was 62 minutes.      Madelyn Altamirano RN, [facility]  Diabetes Education Department  [facility], [place]    Any diabetes medication initiation or dose changes were made via the [facility] Standing Orders per the patient's referring provider. A copy of this encounter was shared with the provider.

## 2025-04-10 NOTE — NURSING NOTE
Current patient location: [address] [place] [id number]    Is the patient currently in the state of [place]? YES    Visit mode: VIDEO    If the visit is dropped, the patient can be reconnected by:VIDEO VISIT: Text to cell phone:   Telephone Information:   Mobile [phone]       Will anyone else be joining the visit? NO  (If patient encounters technical issues they should call [phone] :[id number])    Are changes needed to the allergy or medication list? No    Are refills needed on medications prescribed by this physician? NO    Rooming Documentation:  Questionnaire(s) not pre-assigned    Reason for visit: Diabetes Education    Maureen SANCHEZ

## 2025-04-10 NOTE — LETTER
4/10/2025      Teetee Alvarado  [address]  [facility] [address]      Dear Colleague,    Thank you for referring your patient, Teetee Alvarado, to the [facility]. Please see a copy of my visit note below.    Virtual Visit Details    Type of service:  Video Visit   Video Start Time: 3:26 PM  Video End Time: 4: 28 PM    Originating Location (pt. Location): Home    Distant Location (provider location):  Off-site  Platform used for Video Visit: [employer]    Diabetes Self-Management Education & Support  Teetee Alvarado presents today for education related to Type 2 diabetes    Patient is being treated with:  Diet  She is accompanied by self    Year of diagnosis: 2021  Referring provider:  Dr. Frank  Living Situation: Home with partner (fiance) and son (almost 2 years)  Employment: -one day per week    PATIENT CONCERNS/REASON FOR REFERRAL   Meal planning    ASSESSMENT:    Taking Medication:     Current Diabetes Management per Patient:  Taking diabetes medications? no    Monitoring  Patient glucose self monitoring as follows: never  BG meter: Contour Next     Patient's most recent   Lab Results   Component Value Date    A1C 12.2 03/27/2025      Patient's A1C goal: <7.0    Activity: Biking-up to 17 miles, walks daily    Healthy Eating:   Patient currently eats 2-3 meals, 0-1 snacks per day   Breakfast: Coffee, yogurt, banana, protein bar  Lunch: Chicken nuggets, peas,1/2 banana, club sandwich ,carrots/celery  Dinner: Chicken caesar salad, garlic bread, protein, veggie, pasta, potato  Snacks: Goldfish  Water throughout day, diet coke  Alcohol: None    Problem Solving:    Patient is at risk of hypoglycemia?: NO  Hospitalizations for hyper or hypoglycemia: No    Healthy Coping and Stress Management:   Sources of stress identified by patient: My health  Coping mechanisms identified by patient:  Other (please specify):  Dates with fiance , outdoor time with son      EDUCATION and INSTRUCTION  PROVIDED AT THIS VISIT:    T2 seen for comprehensive review at the request of Dr. Frank. Teetee was diagnosed with T2 in , initially treated with Metformin however discontinued after one year due to GI side effects.She did have GDM with use of insulin approximately 3 years ago. A1C=12.2 she correlates with poor diet and lack of activity. Since recent A1C she has been eating healthier, reducing CHO's, and biking. She does not check glucose; test strips  and no lancets. She is requesting CGM, previously used Robert 3 during pregnancy. Supplies and Robert 3+ ordered today. No history of pancreatitis, thyroid cancer, or gall bladder concerns. Kidney functions normal. Will start Mounjaro 2.5 mg weekly along with Metformin  mg with dinner. Follow up next week via My Chart and in 4 weeks.    Topics Reviewed:  Pathophysiology and progression of diagnosis  Target blood sugar goals  Target A1C goal  Function of liver in the presence of diabetes  Effect that carbohydrates has on blood sugar  Total carbohydrate in grams per meal goals  Benefit of exercise  Ozempic, Mounjaro; MOA, pen demonstration, side effects, dosing  Metformin MOA    Education Materials Provided: Healthy Living with Diabetes, Guide to Counting Carbohydrates via links    Patient-stated goal written and given to Teetee Alvarado.  Verbalized and demonstrated understanding of instructions.   See patient instructions  AVS printed and given to patient-via My Chart    PLAN:  *Start Metformin 500 mg with dinner  *Start Mounjaro 2.5 mg weekly  *If unable to start sensor, check blood sugars before meals and before bed  *Fasting blood sugar goal   *Blood sugar goal 2 hours after meal is <180  *Keep up exercise regimen!    FOLLOW-UP:    *Please My Chart me blood sugars next week  * Video Visit with Madelyn at 3:30pm    Time spent with patient at today's visit was 62 minutes.      Madelyn Altamirano, RN, [facility]  Diabetes Education Department  [place]  [facility] [place] [facility], [place]    Any diabetes medication initiation or dose changes were made via the [facility] Standing Orders per the patient's referring provider. A copy of this encounter was shared with the provider.      Again, thank you for allowing me to participate in the care of your patient.        Sincerely,        Madelyn Altamirano RN    Electronically signed

## 2025-04-10 NOTE — PATIENT INSTRUCTIONS
PLAN:  *Start Metformin 500 mg with dinner  *Start Mounjaro 2.5 mg weekly  *If unable to start sensor, check blood sugars before meals and before bed  *Fasting blood sugar goal   *Blood sugar goal 2 hours after meal is <180  *Keep up exercise regimen!    FOLLOW-UP:    *Please My Chart me blood sugars next week  *5/8 Video Visit with Madelyn at 3:30pm

## 2025-04-11 ENCOUNTER — TELEPHONE (OUTPATIENT)
Dept: FAMILY MEDICINE | Facility: CLINIC | Age: 40
End: 2025-04-11
Payer: COMMERCIAL

## 2025-04-11 NOTE — TELEPHONE ENCOUNTER
PA Initiation    Medication: MOUNJARO 2.5 MG/0.5ML SC SOAJ  Insurance Company: [employer] - Phone [phone] Fax [phone]  Pharmacy Filling the Rx:    Filling Pharmacy Phone:    Filling Pharmacy Fax:    Start Date: 4/11/2025

## 2025-04-11 NOTE — TELEPHONE ENCOUNTER
PRIOR AUTHORIZATION DENIED    Medication: MOUNJARO 2.5 MG/0.5ML SC SOAJ  Insurance Company: [employer] - Phone [phone] Fax [phone]  Denial Date: 4/11/2025  Denial Reason(s):   Appeal Information:   Patient Notified: yes

## 2025-04-14 ENCOUNTER — TELEPHONE (OUTPATIENT)
Dept: FAMILY MEDICINE | Facility: CLINIC | Age: 40
End: 2025-04-14
Payer: COMMERCIAL

## 2025-04-14 NOTE — TELEPHONE ENCOUNTER
Prior Authorization Approval    Medication: OZEMPIC (0.25 OR 0.5 MG/DOSE) 2 MG/3ML SC SOPN  Authorization Effective Date: 3/15/2025  Authorization Expiration Date: 4/14/2026  Approved Dose/Quantity: 3  Reference #: [id number]   Insurance Company: [employer] - Phone [phone] Fax [phone]  Expected CoPay: $    CoPay Card Available:      Financial Assistance Needed:    Which Pharmacy is filling the prescription:    Pharmacy Notified: y  Patient Notified: ari      [Normal Gait] : abnormal gait [Oriented to Place] : disoriented to place [Oriented to Time] : disoriented to time [de-identified] : sitting in wheelchair [FreeTextEntry1] : b/l slight redness of sclera [de-identified] : Reduced sounds in lower zones b/l [de-identified] : Irregularly irregular rhythm, systolic murmur heard [de-identified] : 4 mm pressure wound (stage 2) of upper R ear [de-identified] : RLE weakness 4/5

## 2025-04-14 NOTE — TELEPHONE ENCOUNTER
PA Initiation    Medication: OZEMPIC (0.25 OR 0.5 MG/DOSE) 2 [facility]  Insurance Company: [employer] - Phone [phone] Fax [phone]  Pharmacy Filling the Rx:    Filling Pharmacy Phone:    Filling Pharmacy Fax:    Start Date: 4/14/2025

## 2025-04-14 NOTE — TELEPHONE ENCOUNTER
Patient notified via My Chart Encounter 4/10.    Madelyn Altamirano RN, [facility]  Diabetes Education Department  [facility], [place]

## 2025-04-15 NOTE — TELEPHONE ENCOUNTER
Noted.    Madelyn Altamirano, RN, [facility][note 3]  Diabetes Education Department  [facility], [place]

## 2025-04-16 NOTE — TELEPHONE ENCOUNTER
Start Ozempic when available  Increase Metformin to 2 tablets    Madelyn Altamirano, RN, [facility]  Diabetes Education Department  [facility], [place]

## 2025-04-23 ENCOUNTER — ANCILLARY PROCEDURE (OUTPATIENT)
Dept: MAMMOGRAPHY | Facility: CLINIC | Age: 40
End: 2025-04-23
Attending: FAMILY MEDICINE
Payer: COMMERCIAL

## 2025-04-23 DIAGNOSIS — Z12.31 VISIT FOR SCREENING MAMMOGRAM: ICD-10-CM

## 2025-04-23 PROCEDURE — 77067 SCR MAMMO BI INCL CAD: CPT | Mod: TC | Performed by: RADIOLOGY

## 2025-04-23 PROCEDURE — 77063 BREAST TOMOSYNTHESIS BI: CPT | Mod: TC | Performed by: RADIOLOGY

## 2025-04-24 ENCOUNTER — MYC MEDICAL ADVICE (OUTPATIENT)
Dept: EDUCATION SERVICES | Facility: CLINIC | Age: 40
End: 2025-04-24

## 2025-05-04 DIAGNOSIS — F33.0 MILD EPISODE OF RECURRENT MAJOR DEPRESSIVE DISORDER: ICD-10-CM

## 2025-05-05 RX ORDER — BUPROPION HYDROCHLORIDE 150 MG/1
150 TABLET ORAL EVERY MORNING
Qty: 30 TABLET | Refills: 1 | OUTPATIENT
Start: 2025-05-05

## 2025-05-08 ENCOUNTER — VIRTUAL VISIT (OUTPATIENT)
Dept: EDUCATION SERVICES | Facility: CLINIC | Age: 40
End: 2025-05-08
Payer: COMMERCIAL

## 2025-05-08 ENCOUNTER — VIRTUAL VISIT (OUTPATIENT)
Dept: FAMILY MEDICINE | Facility: CLINIC | Age: 40
End: 2025-05-08
Payer: COMMERCIAL

## 2025-05-08 DIAGNOSIS — F17.200 VAPING NICOTINE DEPENDENCE, NON-TOBACCO PRODUCT: ICD-10-CM

## 2025-05-08 DIAGNOSIS — F90.0 ATTENTION DEFICIT HYPERACTIVITY DISORDER (ADHD), PREDOMINANTLY INATTENTIVE TYPE: ICD-10-CM

## 2025-05-08 DIAGNOSIS — F33.0 MILD EPISODE OF RECURRENT MAJOR DEPRESSIVE DISORDER: ICD-10-CM

## 2025-05-08 DIAGNOSIS — E11.65 TYPE 2 DIABETES MELLITUS WITH HYPERGLYCEMIA, WITHOUT LONG-TERM CURRENT USE OF INSULIN (H): Primary | ICD-10-CM

## 2025-05-08 PROBLEM — F33.1 MODERATE EPISODE OF RECURRENT MAJOR DEPRESSIVE DISORDER (H): Status: RESOLVED | Noted: 2025-05-08 | Resolved: 2025-05-08

## 2025-05-08 PROBLEM — F33.1 MODERATE EPISODE OF RECURRENT MAJOR DEPRESSIVE DISORDER (H): Status: ACTIVE | Noted: 2025-05-08

## 2025-05-08 RX ORDER — METHYLPHENIDATE HYDROCHLORIDE 54 MG/1
54 TABLET ORAL DAILY
Qty: 30 TABLET | Refills: 0 | Status: SHIPPED | OUTPATIENT
Start: 2025-07-07 | End: 2025-08-06

## 2025-05-08 RX ORDER — AZITHROMYCIN 250 MG/1
TABLET, FILM COATED ORAL
COMMUNITY
Start: 2024-12-20 | End: 2025-05-08

## 2025-05-08 RX ORDER — METFORMIN HYDROCHLORIDE 500 MG/1
TABLET, EXTENDED RELEASE ORAL
COMMUNITY
Start: 2025-05-08

## 2025-05-08 ASSESSMENT — PATIENT HEALTH QUESTIONNAIRE - PHQ9
10. IF YOU CHECKED OFF ANY PROBLEMS, HOW DIFFICULT HAVE THESE PROBLEMS MADE IT FOR YOU TO DO YOUR WORK, TAKE CARE OF THINGS AT HOME, OR GET ALONG WITH OTHER PEOPLE: SOMEWHAT DIFFICULT
SUM OF ALL RESPONSES TO PHQ QUESTIONS 1-9: 9
SUM OF ALL RESPONSES TO PHQ QUESTIONS 1-9: 9

## 2025-05-08 NOTE — PROGRESS NOTES
Virtual Visit Details    Type of service:  Video Visit   Video Start Time: 3:33 PM  Video End Time:3:44 PM    Originating Location (pt. Location): Home    Distant Location (provider location):  Off-site  Platform used for Video Visit: Breanna    Diabetes Self-Management Education & Support  Teetee Alvarado presents today for education related to Type 2 diabetes    Patient is being treated with:  Diet  She is accompanied by self    Year of diagnosis: 2021  Referring provider:  Dr. Frank  Living Situation: Home with partner (fidebby) and son (almost 2 years)  Employment: -one day per week    PATIENT CONCERNS/REASON FOR REFERRAL   No concerns    ASSESSMENT:    Taking Medication:     Current Diabetes Management per Patient:  Taking diabetes medications?   Ozempic 0.25 mg on Tuesdays  Metformin 1000mg daily    Monitoring  Patient glucose self monitoring as follows: never  BG meter: Robert 3+      Patient's most recent   Lab Results   Component Value Date    A1C 12.2 03/27/2025      Patient's A1C goal: <7.0    Activity: Biking-up to 17 miles, walks daily    Healthy Eating:   Patient currently eats 2-3 meals, 0-1 snacks per day   Breakfast: Coffee, yogurt, banana, protein bar  Lunch: Chicken nuggets, peas,1/2 banana, club sandwich ,carrots/celery  Dinner: Chicken caesar salad, garlic bread, protein, veggie, pasta, potato  Snacks: Goldfish  Water throughout day, diet coke  Alcohol: None    Problem Solving:    Patient is at risk of hypoglycemia?: NO  Hospitalizations for hyper or hypoglycemia: No    Healthy Coping and Stress Management:   Sources of stress identified by patient: My health  Coping mechanisms identified by patient:  Other (please specify):  Dates with fidebby, outdoor time with son    EDUCATION and INSTRUCTION PROVIDED AT THIS VISIT:    T2 seen for comprehensive review glucose after starting Metformin and Ozempic. Teetee has been working very hard on diet and exercise. She took her 3rd dose of Ozempic  Tuesday, tolerating without side effects. Down 5#, appetite suppressed, possibly from Concerta as well. She likes CGM so doesn't have to finger poke and for tracking how food effects glucose. TIR is 94% with 0% lows. Average glucose 135. Reminded patient to get at least 30 grams of CHO's per meal. PCP increased Ozempic to 0.5 mg weekly and recommended to increase Metformin however given TIR I think it's appropriate to remain on two tablets. Follow up with PCP in 3 months. Follow up with Diabetes Education as needed.     Topics Reviewed:  *TIR goals  *CHO goals    Patient-stated goal written and given to Teetee Alvarado.  Verbalized and demonstrated understanding of instructions.   See patient instructions  AVS printed and given to patient-via My Chart    PLAN:  *Increase Ozempic to 0.5 mg on 5/20  *Continue Metformin two tablets with dinner    FOLLOW-UP:    *Dr. Frank in 3 months    Time spent with patient at today's visit was 11 minutes.      Madelyn Altamirano RN, [facility]  Diabetes Education Department  [facility], [place]    Any diabetes medication initiation or dose changes were made via the [facility] Standing Orders per the patient's referring provider. A copy of this encounter was shared with the provider.

## 2025-05-08 NOTE — PATIENT INSTRUCTIONS
PLAN:  *Increase Ozempic to 0.5 mg on 5/20  *Continue Metformin two tablets with dinner    FOLLOW-UP:    *Dr. Frank in 3 months

## 2025-05-08 NOTE — LETTER
5/8/2025      Teetee Alvarado  [address]  [place] [address]      Dear Colleague,    Thank you for referring your patient, Teetee Alvarado, to the [facility]. Please see a copy of my visit note below.    Virtual Visit Details    Type of service:  Video Visit   Video Start Time: 3:33 PM  Video End Time:3:44 PM    Originating Location (pt. Location): Home    Distant Location (provider location):  Off-site  Platform used for Video Visit: [employer]    Diabetes Self-Management Education & Support  Teetee Alvarado presents today for education related to Type 2 diabetes    Patient is being treated with:  Diet  She is accompanied by self    Year of diagnosis: 2021  Referring provider:  Dr. Frank  Living Situation: Home with partner (fiance) and son (almost 2 years)  Employment: -one day per week    PATIENT CONCERNS/REASON FOR REFERRAL   No concerns    ASSESSMENT:    Taking Medication:     Current Diabetes Management per Patient:  Taking diabetes medications?   Ozempic 0.25 mg on Tuesdays  Metformin 1000mg daily    Monitoring  Patient glucose self monitoring as follows: never  BG meter: Robert 3+      Patient's most recent   Lab Results   Component Value Date    A1C 12.2 03/27/2025      Patient's A1C goal: <7.0    Activity: Biking-up to 17 miles, walks daily    Healthy Eating:   Patient currently eats 2-3 meals, 0-1 snacks per day   Breakfast: Coffee, yogurt, banana, protein bar  Lunch: Chicken nuggets, peas,1/2 banana, club sandwich ,carrots/celery  Dinner: Chicken caesar salad, garlic bread, protein, veggie, pasta, potato  Snacks: Goldfish  Water throughout day, diet coke  Alcohol: None    Problem Solving:    Patient is at risk of hypoglycemia?: NO  Hospitalizations for hyper or hypoglycemia: No    Healthy Coping and Stress Management:   Sources of stress identified by patient: My health  Coping mechanisms identified by patient:  Other (please specify):  Dates with fiance, outdoor time  with son    EDUCATION and INSTRUCTION PROVIDED AT THIS VISIT:    T2 seen for comprehensive review glucose after starting Metformin and Ozempic. Teeete has been working very hard on diet and exercise. She took her 3rd dose of Ozempic Tuesday, tolerating without side effects. Down 5#, appetite suppressed, possibly from Concerta as well. She likes CGM so doesn't have to finger poke and for tracking how food effects glucose. TIR is 94% with 0% lows. Average glucose 135. Reminded patient to get at least 30 grams of CHO's per meal. PCP increased Ozempic to 0.5 mg weekly and recommended to increase Metformin however given TIR I think it's appropriate to remain on two tablets. Follow up with PCP in 3 months. Follow up with Diabetes Education as needed.     Topics Reviewed:  *TIR goals  *CHO goals    Patient-stated goal written and given to Teetee Alvarado.  Verbalized and demonstrated understanding of instructions.   See patient instructions  AVS printed and given to patient-via My Chart    PLAN:  *Increase Ozempic to 0.5 mg on 5/20  *Continue Metformin two tablets with dinner    FOLLOW-UP:    *Dr. Frank in 3 months    Time spent with patient at today's visit was 11 minutes.      Madelyn Altamirano RN, [facility]  Diabetes Education Department  [facility], [place]    Any diabetes medication initiation or dose changes were made via the [facility] Standing Orders per the patient's referring provider. A copy of this encounter was shared with the provider.      Again, thank you for allowing me to participate in the care of your patient.        Sincerely,        Madelyn Altamirano RN    Electronically signed

## 2025-05-08 NOTE — NURSING NOTE
Current patient location: [address] [place] [id number]    Is the patient currently in the state of [place]? YES    Visit mode: VIDEO    If the visit is dropped, the patient can be reconnected by:VIDEO VISIT: Text to cell phone:   Telephone Information:   Mobile [phone]       Will anyone else be joining the visit? NO  (If patient encounters technical issues they should call [phone]    Are changes needed to the allergy or medication list? Yes pt states has not started nicotine patch     Are refills needed on medications prescribed by this physician? NO    Rooming Documentation:  Not applicable    Reason for visit: RECHECK (Follow-up criss )    Polly SANHCEZ

## 2025-05-08 NOTE — PROGRESS NOTES
"Teetee is a 40 year old who is being evaluated via a billable video visit.    How would you like to obtain your AVS? MyChart  If the video visit is dropped, the invitation should be resent by: Text to cell phone: [phone]  Will anyone else be joining your video visit? No      Assessment & Plan     Type 2 diabetes mellitus with hyperglycemia, without long-term current use of insulin (H)  Last hemoglobin A1c elevated at 12.2%.  Subsequently started on metformin and Ozempic.  Recommend increasing metformin to 500 mg with breakfast and 1000 mg with dinner, also increase Ozempic to 0.5 mg weekly.  Refills provided.  Following up with diabetic education later today.  Encouraged annual eye exam.  BMI  Estimated body mass index is 40.5 kg/m  as calculated from the following:    Height as of 3/27/25: 1.651 m (5' 5\").    Weight as of 3/27/25: 110.4 kg (243 lb 6.4 oz).   Weight management plan: Discussed healthy diet and exercise guidelines  - metFORMIN (GLUCOPHAGE XR) 500 MG 24 hr tablet; Take 500 mg (1 tablet) breakfast and 1000 mg (2 tablets) with dinner  - semaglutide (OZEMPIC) 2 MG/3ML pen; Inject 0.5 mg subcutaneously every 7 days.    Attention deficit hyperactivity disorder (ADHD), predominantly inattentive type  Slight improvement in ADHD symptoms, though continues to struggle with focus and clustered thoughts.  Because of this we will increase Concerta to 54 mg daily.  Up-to-date on UDS/CSA.   reviewed and appropriate  - methylphenidate HCl ER, OSM, (CONCERTA) 54 MG CR tablet; Take 1 tablet (54 mg) by mouth daily.    Mild episode of recurrent major depressive disorder  Stable on Wellbutrin    Vaping nicotine dependence, non-tobacco product  Interested in vaping cessation, will offer nicotine patches to help.  - nicotine (NICODERM CQ) 7 MG/24HR 24 hr patch; Place 1 patch over 24 hours onto the skin every 24 hours.  Dispense: 14 patch; Refill: 1        The longitudinal plan of care for the " diagnosis(es)/condition(s) as documented were addressed during this visit. Due to the added complexity in care, I will continue to support Teetee in the subsequent management and with ongoing continuity of care.      Subjective   Teetee is a 40 year old, presenting for the following health issues:  Recheck Medication (Discuss increasing med doseage)        5/8/2025    10:36 AM   Additional Questions   Roomed by Lena SHELBY   Accompanied by self     Video Start Time: 10:58 AM    History of Present Illness       Reason for visit:  Med check She is missing 6 dose(s) of medications per week.  She is not taking prescribed medications regularly due to other.        Has tolerated Ozempic and metformin well.  Denies nausea, vomiting, constipation, diarrhea.  Has been working on dietary changes.  Mild weight loss, hoping to lose more.  Trying to get more activity.    Concerta was somewhat helpful for ADHD symptoms, though still has difficulty focusing, racing thoughts, word finding difficulty.  Previously was on high doses of Concerta prior to pregnancy.    Mental health stable on Wellbutrin.    Wt Readings from Last 4 Encounters:   03/27/25 110.4 kg (243 lb 6.4 oz)   04/18/24 129.3 kg (285 lb)   01/02/24 133.1 kg (293 lb 6.4 oz)   08/30/23 122.5 kg (270 lb)     Patient continues to vape.  Interested in cessation.  Would like to try nicotine patches.      Objective    Vitals - Patient Reported  Weight (Patient Reported): 108 kg (238 lb)        Physical Exam   GENERAL: alert and no distress  EYES: Eyes grossly normal to inspection.  No discharge or erythema, or obvious scleral/conjunctival abnormalities.  RESP: No audible wheeze, cough, or visible cyanosis.    SKIN: Visible skin clear. No significant rash, abnormal pigmentation or lesions.  NEURO: Cranial nerves grossly intact.  Mentation and speech appropriate for age.  PSYCH: Appropriate affect, tone, and pace of words          Video-Visit Details    Type of service:  Video  Visit   Video End Time:11:09 AM  Originating Location (pt. Location): Home    Distant Location (provider location):  On-site  Platform used for Video Visit: Breanna  Signed Electronically by: Rosa Frank DO

## 2025-05-14 ENCOUNTER — TELEPHONE (OUTPATIENT)
Dept: FAMILY MEDICINE | Facility: CLINIC | Age: 40
End: 2025-05-14
Payer: COMMERCIAL

## 2025-05-14 DIAGNOSIS — F90.0 ATTENTION DEFICIT HYPERACTIVITY DISORDER (ADHD), PREDOMINANTLY INATTENTIVE TYPE: ICD-10-CM

## 2025-05-14 RX ORDER — METHYLPHENIDATE HYDROCHLORIDE 54 MG/1
54 TABLET ORAL DAILY
Qty: 30 TABLET | Refills: 0 | Status: SHIPPED | OUTPATIENT
Start: 2025-07-07 | End: 2025-05-15

## 2025-05-14 NOTE — TELEPHONE ENCOUNTER
1. Attention deficit hyperactivity disorder (ADHD), predominantly inattentive type  - methylphenidate HCl ER, OSM, (CONCERTA) 54 MG CR tablet; Take 1 tablet (54 mg) by mouth daily.  Dispense: 30 tablet; Refill: 0     Reviewed [place] . Patient is due for refill. Prescription updated for PCP at pharmacy.  Rowan Martinez DO

## 2025-05-14 NOTE — TELEPHONE ENCOUNTER
Patient currently at pharmacy and having issues filling methylphenidate prescription. Reviewing chart, it looks like the current prescription has a fill date of 7/7/25. Please review and send an updated prescription, if appropriate. Thanks!    Beatrice Beltran RN

## 2025-05-15 DIAGNOSIS — F90.0 ATTENTION DEFICIT HYPERACTIVITY DISORDER (ADHD), PREDOMINANTLY INATTENTIVE TYPE: ICD-10-CM

## 2025-05-15 RX ORDER — METHYLPHENIDATE HYDROCHLORIDE 54 MG/1
54 TABLET ORAL DAILY
Qty: 30 TABLET | Refills: 0 | Status: SHIPPED | OUTPATIENT
Start: 2025-05-15

## 2025-05-28 ENCOUNTER — MYC MEDICAL ADVICE (OUTPATIENT)
Dept: FAMILY MEDICINE | Facility: CLINIC | Age: 40
End: 2025-05-28
Payer: COMMERCIAL

## 2025-05-28 DIAGNOSIS — E11.65 TYPE 2 DIABETES MELLITUS WITH HYPERGLYCEMIA, WITHOUT LONG-TERM CURRENT USE OF INSULIN (H): Primary | ICD-10-CM

## 2025-05-28 RX ORDER — METFORMIN HYDROCHLORIDE 500 MG/1
1000 TABLET, EXTENDED RELEASE ORAL
Qty: 180 TABLET | Refills: 2 | Status: SHIPPED | OUTPATIENT
Start: 2025-05-28

## 2025-06-16 ENCOUNTER — MYC REFILL (OUTPATIENT)
Dept: FAMILY MEDICINE | Facility: CLINIC | Age: 40
End: 2025-06-16
Payer: COMMERCIAL

## 2025-06-16 DIAGNOSIS — F90.0 ATTENTION DEFICIT HYPERACTIVITY DISORDER (ADHD), PREDOMINANTLY INATTENTIVE TYPE: ICD-10-CM

## 2025-06-16 DIAGNOSIS — F33.0 MILD EPISODE OF RECURRENT MAJOR DEPRESSIVE DISORDER: ICD-10-CM

## 2025-06-16 RX ORDER — BUPROPION HYDROCHLORIDE 150 MG/1
150 TABLET ORAL EVERY MORNING
Qty: 90 TABLET | Refills: 1 | Status: SHIPPED | OUTPATIENT
Start: 2025-06-16

## 2025-06-16 RX ORDER — METHYLPHENIDATE HYDROCHLORIDE 54 MG/1
54 TABLET ORAL DAILY
Qty: 30 TABLET | Refills: 0 | Status: SHIPPED | OUTPATIENT
Start: 2025-06-16

## 2025-07-07 ENCOUNTER — MYC REFILL (OUTPATIENT)
Dept: EDUCATION SERVICES | Facility: CLINIC | Age: 40
End: 2025-07-07
Payer: COMMERCIAL

## 2025-07-07 DIAGNOSIS — E11.65 TYPE 2 DIABETES MELLITUS WITH HYPERGLYCEMIA, WITHOUT LONG-TERM CURRENT USE OF INSULIN (H): ICD-10-CM

## 2025-07-07 RX ORDER — HYDROCHLOROTHIAZIDE 12.5 MG/1
CAPSULE ORAL
Qty: 6 EACH | Refills: 1 | Status: SHIPPED | OUTPATIENT
Start: 2025-07-07

## 2025-07-07 NOTE — TELEPHONE ENCOUNTER
First Attempt: I sent a my chart message to the patient to please contact our office to schedule a Diabetic Check + weight follow up appt.

## 2025-07-07 NOTE — TELEPHONE ENCOUNTER
Please call and schedule patient for diabetes/weight loss follow-up visit in August.    Rosa Frank, DO

## 2025-07-22 ENCOUNTER — MYC MEDICAL ADVICE (OUTPATIENT)
Dept: FAMILY MEDICINE | Facility: CLINIC | Age: 40
End: 2025-07-22
Payer: COMMERCIAL

## 2025-07-22 ENCOUNTER — MYC REFILL (OUTPATIENT)
Dept: FAMILY MEDICINE | Facility: CLINIC | Age: 40
End: 2025-07-22
Payer: COMMERCIAL

## 2025-07-22 DIAGNOSIS — F90.0 ATTENTION DEFICIT HYPERACTIVITY DISORDER (ADHD), PREDOMINANTLY INATTENTIVE TYPE: ICD-10-CM

## 2025-07-22 DIAGNOSIS — E11.65 TYPE 2 DIABETES MELLITUS WITH HYPERGLYCEMIA, WITHOUT LONG-TERM CURRENT USE OF INSULIN (H): ICD-10-CM

## 2025-07-23 RX ORDER — METHYLPHENIDATE HYDROCHLORIDE 54 MG/1
54 TABLET ORAL DAILY
Qty: 30 TABLET | Refills: 0 | Status: SHIPPED | OUTPATIENT
Start: 2025-07-23

## 2025-08-19 ENCOUNTER — MYC REFILL (OUTPATIENT)
Dept: FAMILY MEDICINE | Facility: CLINIC | Age: 40
End: 2025-08-19
Payer: COMMERCIAL

## 2025-08-19 DIAGNOSIS — E11.65 TYPE 2 DIABETES MELLITUS WITH HYPERGLYCEMIA, WITHOUT LONG-TERM CURRENT USE OF INSULIN (H): ICD-10-CM

## (undated) DEVICE — Device

## (undated) DEVICE — PACK MAJOR BASIN 673

## (undated) DEVICE — PACK MINOR SINGLE BASIN SSK3001

## (undated) DEVICE — SUTURE PDS 0 27IN CT1 + VIOLET PDP340H

## (undated) DEVICE — PLATE GROUNDING ADULT W/CORD 9165L

## (undated) DEVICE — DRESSING MEPILEX AG SILVER 4X12 395990

## (undated) DEVICE — PAD INSERT MED PEACH 14X6.5 62550

## (undated) DEVICE — SPONGE LAP 18X18" X8435

## (undated) DEVICE — PREP CHLORAPREP 26ML TINTED HI-LITE ORANGE 930815

## (undated) DEVICE — CATH SUCTION 10FR W/TRAP DYND44110

## (undated) DEVICE — GOWN IMPERVIOUS BREATHABLE SMART LG 89015

## (undated) DEVICE — GLOVE UNDER INDICATOR PI SZ 6 LF 41660

## (undated) DEVICE — SUTURE VICRYL+ 3-0 27IN CT-1 UND VCP258H

## (undated) DEVICE — TUBE BLOOD LAV 4.0ML EDTA PLSTC

## (undated) DEVICE — SUCTION KIWI VAC  VAC-6000M

## (undated) DEVICE — SOL NACL 0.9% IRRIG 1000ML BOTTLE 2F7124

## (undated) DEVICE — GLOVE SURG PI ULTRA TOUCH M SZ 6-1/2 LF

## (undated) DEVICE — DRSG STERI STRIP 1/2X4" R1547

## (undated) DEVICE — GLOVE SURG PI ULTRA TOUCH M SZ 7 LF 42670

## (undated) DEVICE — GLOVE BIOGEL PI ULTRATOUCH G SZ 6.0 42160

## (undated) DEVICE — SUTURE MONOCRYL+ 4-0 PS-2 27IN MCP426H

## (undated) DEVICE — SOL WATER IRRIG 1000ML BOTTLE 2F7114

## (undated) DEVICE — SU DERMABOND PRINEO 42CM CLR422US

## (undated) DEVICE — SU CHROMIC 1 CT 27" 803H

## (undated) DEVICE — CUSTOM PACK C-SECTION LHE

## (undated) DEVICE — SUCTION MANIFOLD NEPTUNE 2 SYS 1 PORT 702-025-000

## (undated) DEVICE — RETR PANNICULUS TRAXI FOR PT POSITIONING PRS-1030

## (undated) DEVICE — SOL ADH LIQUID BENZOIN SWAB 0.6ML C1544

## (undated) DEVICE — SU CHROMIC 3-0 SH 27" G122H

## (undated) DEVICE — BLADE CLIPPER PIVOT PURPLE DISP 9660

## (undated) DEVICE — SU PLAIN 3-0 XLH  27" 52T

## (undated) DEVICE — SUTURE VICRYL+ 2-0 27IN CT-1 UND VCP259H

## (undated) RX ORDER — ONDANSETRON 2 MG/ML
INJECTION INTRAMUSCULAR; INTRAVENOUS
Status: DISPENSED
Start: 2023-06-27

## (undated) RX ORDER — DEXAMETHASONE SODIUM PHOSPHATE 4 MG/ML
INJECTION, SOLUTION INTRA-ARTICULAR; INTRALESIONAL; INTRAMUSCULAR; INTRAVENOUS; SOFT TISSUE
Status: DISPENSED
Start: 2023-06-27

## (undated) RX ORDER — TRANEXAMIC ACID 100 MG/ML
INJECTION, SOLUTION INTRAVENOUS
Status: DISPENSED
Start: 2023-06-27

## (undated) RX ORDER — CEFAZOLIN SODIUM 1 G/3ML
INJECTION, POWDER, FOR SOLUTION INTRAMUSCULAR; INTRAVENOUS
Status: DISPENSED
Start: 2023-06-27

## (undated) RX ORDER — FENTANYL CITRATE-0.9 % NACL/PF 10 MCG/ML
PLASTIC BAG, INJECTION (ML) INTRAVENOUS
Status: DISPENSED
Start: 2023-06-27